# Patient Record
Sex: FEMALE | Race: OTHER | HISPANIC OR LATINO | Employment: FULL TIME | URBAN - METROPOLITAN AREA
[De-identification: names, ages, dates, MRNs, and addresses within clinical notes are randomized per-mention and may not be internally consistent; named-entity substitution may affect disease eponyms.]

---

## 2022-03-18 ENCOUNTER — APPOINTMENT (OUTPATIENT)
Dept: URGENT CARE | Facility: CLINIC | Age: 27
End: 2022-03-18

## 2022-03-18 ENCOUNTER — APPOINTMENT (OUTPATIENT)
Dept: LAB | Facility: CLINIC | Age: 27
End: 2022-03-18

## 2022-03-18 DIAGNOSIS — Z02.1 PRE-EMPLOYMENT HEALTH SCREENING EXAMINATION: ICD-10-CM

## 2022-03-18 DIAGNOSIS — Z02.1 PRE-EMPLOYMENT HEALTH SCREENING EXAMINATION: Primary | ICD-10-CM

## 2022-03-18 LAB — RUBV IGG SERPL IA-ACNC: >175 IU/ML

## 2022-03-18 PROCEDURE — 86762 RUBELLA ANTIBODY: CPT

## 2022-03-18 PROCEDURE — 86735 MUMPS ANTIBODY: CPT

## 2022-03-18 PROCEDURE — 86765 RUBEOLA ANTIBODY: CPT

## 2022-03-18 PROCEDURE — 86787 VARICELLA-ZOSTER ANTIBODY: CPT

## 2022-03-18 PROCEDURE — 36415 COLL VENOUS BLD VENIPUNCTURE: CPT

## 2022-03-18 PROCEDURE — 86480 TB TEST CELL IMMUN MEASURE: CPT

## 2022-03-21 LAB
GAMMA INTERFERON BACKGROUND BLD IA-ACNC: 0.04 IU/ML
M TB IFN-G BLD-IMP: NEGATIVE
M TB IFN-G CD4+ BCKGRND COR BLD-ACNC: 0.01 IU/ML
M TB IFN-G CD4+ BCKGRND COR BLD-ACNC: 0.03 IU/ML
MITOGEN IGNF BCKGRD COR BLD-ACNC: >10 IU/ML

## 2022-03-22 LAB
MEV IGG SER QL: NORMAL
MUV IGG SER QL: NORMAL
VZV IGG SER IA-ACNC: NORMAL

## 2022-04-28 ENCOUNTER — HOSPITAL ENCOUNTER (EMERGENCY)
Facility: HOSPITAL | Age: 27
Discharge: HOME | End: 2022-04-28
Attending: EMERGENCY MEDICINE
Payer: COMMERCIAL

## 2022-04-28 VITALS
HEART RATE: 80 BPM | TEMPERATURE: 97.9 F | OXYGEN SATURATION: 97 % | SYSTOLIC BLOOD PRESSURE: 128 MMHG | RESPIRATION RATE: 20 BRPM | WEIGHT: 159.61 LBS | DIASTOLIC BLOOD PRESSURE: 80 MMHG

## 2022-04-28 DIAGNOSIS — M79.604 RIGHT LEG PAIN: Primary | ICD-10-CM

## 2022-04-28 PROCEDURE — 96372 THER/PROPH/DIAG INJ SC/IM: CPT

## 2022-04-28 PROCEDURE — 99283 EMERGENCY DEPT VISIT LOW MDM: CPT

## 2022-04-28 PROCEDURE — 99284 EMERGENCY DEPT VISIT MOD MDM: CPT | Performed by: EMERGENCY MEDICINE

## 2022-04-28 RX ORDER — LIDOCAINE 40 MG/G
CREAM TOPICAL AS NEEDED
Qty: 30 G | Refills: 0 | Status: SHIPPED | OUTPATIENT
Start: 2022-04-28

## 2022-04-28 RX ORDER — NAPROXEN 250 MG/1
250 TABLET ORAL
Qty: 21 TABLET | Refills: 0 | Status: SHIPPED | OUTPATIENT
Start: 2022-04-28 | End: 2022-05-05

## 2022-04-28 RX ORDER — KETOROLAC TROMETHAMINE 30 MG/ML
15 INJECTION, SOLUTION INTRAMUSCULAR; INTRAVENOUS ONCE
Status: COMPLETED | OUTPATIENT
Start: 2022-04-28 | End: 2022-04-28

## 2022-04-28 RX ADMIN — KETOROLAC TROMETHAMINE 15 MG: 30 INJECTION, SOLUTION INTRAMUSCULAR at 10:38

## 2022-04-28 NOTE — Clinical Note
Tasia Crain was seen and treated in our emergency department on 4/28/2022  No restrictions            Diagnosis:     Kelsie Guerrero  may return to work on return date  She may return on this date: 04/30/2022         If you have any questions or concerns, please don't hesitate to call        Charlyne Ahumada, MD    ______________________________           _______________          _______________  Hospital Representative                              Date                                Time

## 2022-05-11 ENCOUNTER — HOSPITAL ENCOUNTER (EMERGENCY)
Facility: HOSPITAL | Age: 27
Discharge: HOME | End: 2022-05-11
Attending: EMERGENCY MEDICINE
Payer: COMMERCIAL

## 2022-05-11 VITALS
SYSTOLIC BLOOD PRESSURE: 126 MMHG | RESPIRATION RATE: 20 BRPM | HEART RATE: 90 BPM | OXYGEN SATURATION: 100 % | DIASTOLIC BLOOD PRESSURE: 78 MMHG | TEMPERATURE: 97.8 F

## 2022-05-11 DIAGNOSIS — R51.9 HEADACHE: Primary | ICD-10-CM

## 2022-05-11 DIAGNOSIS — R11.0 NAUSEA: ICD-10-CM

## 2022-05-11 PROCEDURE — 96361 HYDRATE IV INFUSION ADD-ON: CPT

## 2022-05-11 PROCEDURE — 96374 THER/PROPH/DIAG INJ IV PUSH: CPT

## 2022-05-11 PROCEDURE — 99284 EMERGENCY DEPT VISIT MOD MDM: CPT | Performed by: PHYSICIAN ASSISTANT

## 2022-05-11 PROCEDURE — 99283 EMERGENCY DEPT VISIT LOW MDM: CPT

## 2022-05-11 PROCEDURE — 96375 TX/PRO/DX INJ NEW DRUG ADDON: CPT

## 2022-05-11 RX ORDER — KETOROLAC TROMETHAMINE 30 MG/ML
30 INJECTION, SOLUTION INTRAMUSCULAR; INTRAVENOUS ONCE
Status: COMPLETED | OUTPATIENT
Start: 2022-05-11 | End: 2022-05-11

## 2022-05-11 RX ORDER — ONDANSETRON 4 MG/1
4 TABLET, ORALLY DISINTEGRATING ORAL EVERY 6 HOURS PRN
Qty: 20 TABLET | Refills: 0 | Status: SHIPPED | OUTPATIENT
Start: 2022-05-11

## 2022-05-11 RX ORDER — DIPHENHYDRAMINE HYDROCHLORIDE 50 MG/ML
25 INJECTION INTRAMUSCULAR; INTRAVENOUS ONCE
Status: COMPLETED | OUTPATIENT
Start: 2022-05-11 | End: 2022-05-11

## 2022-05-11 RX ORDER — METOCLOPRAMIDE HYDROCHLORIDE 5 MG/ML
10 INJECTION INTRAMUSCULAR; INTRAVENOUS ONCE
Status: COMPLETED | OUTPATIENT
Start: 2022-05-11 | End: 2022-05-11

## 2022-05-11 RX ADMIN — SODIUM CHLORIDE 1000 ML: 0.9 INJECTION, SOLUTION INTRAVENOUS at 05:06

## 2022-05-11 RX ADMIN — KETOROLAC TROMETHAMINE 30 MG: 30 INJECTION, SOLUTION INTRAMUSCULAR at 05:10

## 2022-05-11 RX ADMIN — METOCLOPRAMIDE 10 MG: 5 INJECTION, SOLUTION INTRAMUSCULAR; INTRAVENOUS at 05:08

## 2022-05-11 RX ADMIN — DIPHENHYDRAMINE HYDROCHLORIDE 25 MG: 50 INJECTION, SOLUTION INTRAMUSCULAR; INTRAVENOUS at 05:06

## 2022-05-11 NOTE — DISCHARGE INSTRUCTIONS
Tylenol and motrin for headache    Increase hydration  Use heating pad to the neck for improvement of headache  Apply ice to area of pain for improvement of headache  Zofran to the pharmacy for nausea  Follow up with family medicine

## 2022-05-11 NOTE — Clinical Note
Keyonna Lyles was seen and treated in our emergency department on 5/11/2022  No restrictions    ? ? Diagnosis: ?    Dean Morgan  is off the rest of the shift today  She may return on this date: ?    ? If you have any questions or concerns, please don't hesitate to call        James Cordoba PA-C    ______________________________           _______________          _______________  Hospital Representative                              Date                                Time

## 2022-05-11 NOTE — ED PROVIDER NOTES
History  Chief Complaint   Patient presents with    Migraine     Pt reports migraine on/off for 4 days  Hx of migraines  31 y/o F PMHx of migraines p/w intermittent headache for 4 days  Patient states headache tonight is frontal and pressure like, different than her migraines which are normally one sided and throbbing  Headache is associated with nausea which patient states is not typical for migraines  She has not taken anything prior to arrival   Patient is new to the area and has not been able to follow-up with family doctor for migraine medication  Patient does have follow-up appointment next week scheduled  She denies any visual disturbance, photophobia, phonophobia, focal deficit, facial asymmetry, slurring speech, numbness/tingling / weakness, gait imbalance  Patient also denies chest pain, shortness of breath, URI symptoms, abdominal pain, vomiting, diarrhea  History provided by:  Patient   used: No    Migraine  Location:   frontal  Quality:  Pressure  Severity:  Moderate  Onset quality:  Gradual  Timing:  Intermittent  Progression:  Waxing and waning  Chronicity:  New  Associated symptoms: headaches and nausea    Associated symptoms: no abdominal pain, no chest pain, no congestion, no cough, no diarrhea, no ear pain, no fatigue, no fever, no loss of consciousness, no myalgias, no rash, no rhinorrhea, no shortness of breath, no sore throat, no vomiting and no wheezing        Prior to Admission Medications   Prescriptions Last Dose Informant Patient Reported?  Taking?   lidocaine (LMX) 4 % cream   No No   Sig: Apply topically as needed for moderate pain   naproxen (NAPROSYN) 250 mg tablet   No No   Sig: Take 1 tablet (250 mg total) by mouth 3 (three) times a day with meals for 7 days      Facility-Administered Medications: None       Past Medical History:   Diagnosis Date    Fatty liver     Migraine        Past Surgical History:   Procedure Laterality Date     SECTION      LYMPHADENECTOMY         History reviewed  No pertinent family history  I have reviewed and agree with the history as documented  E-Cigarette/Vaping     E-Cigarette/Vaping Substances     Social History     Tobacco Use    Smoking status: Never Smoker    Smokeless tobacco: Never Used   Substance Use Topics    Alcohol use: Not Currently    Drug use: Never       Review of Systems   Constitutional: Negative for chills, fatigue and fever  HENT: Negative for congestion, ear pain, rhinorrhea, sinus pain, sore throat and trouble swallowing  Eyes: Negative for discharge, redness, itching and visual disturbance  Respiratory: Negative for cough, shortness of breath and wheezing  Cardiovascular: Negative for chest pain, palpitations and leg swelling  Gastrointestinal: Positive for nausea  Negative for abdominal pain, constipation, diarrhea and vomiting  Genitourinary: Negative for dysuria, frequency, hematuria, urgency, vaginal bleeding and vaginal discharge  Musculoskeletal: Negative for back pain, myalgias, neck pain and neck stiffness  Skin: Negative for color change, pallor, rash and wound  Neurological: Positive for headaches  Negative for dizziness, loss of consciousness, syncope, weakness, light-headedness and numbness  All other systems reviewed and are negative  Physical Exam  Physical Exam  Vitals reviewed  Constitutional:       General: She is not in acute distress  Appearance: Normal appearance  She is well-developed and well-groomed  She is not ill-appearing, toxic-appearing or diaphoretic  HENT:      Head: Normocephalic and atraumatic  Right Ear: External ear normal       Left Ear: External ear normal       Nose: Nose normal  No congestion or rhinorrhea  Mouth/Throat:      Mouth: Mucous membranes are moist       Pharynx: Oropharynx is clear  No oropharyngeal exudate or posterior oropharyngeal erythema     Eyes:      General: Lids are normal  No scleral icterus  Right eye: No discharge  Left eye: No discharge  Extraocular Movements: Extraocular movements intact  Right eye: Normal extraocular motion and no nystagmus  Left eye: Normal extraocular motion and no nystagmus  Conjunctiva/sclera: Conjunctivae normal       Pupils: Pupils are equal, round, and reactive to light  Cardiovascular:      Rate and Rhythm: Normal rate and regular rhythm  Pulses: Normal pulses  Heart sounds: No murmur heard  No friction rub  No gallop  Pulmonary:      Effort: Pulmonary effort is normal  No respiratory distress  Breath sounds: Normal breath sounds  No wheezing, rhonchi or rales  Abdominal:      General: Abdomen is flat  There is no distension  Palpations: Abdomen is soft  Tenderness: There is no abdominal tenderness  There is no right CVA tenderness, left CVA tenderness, guarding or rebound  Musculoskeletal:         General: No deformity  Normal range of motion  Cervical back: Normal range of motion and neck supple  Skin:     General: Skin is warm and dry  Coloration: Skin is not jaundiced or pale  Findings: No rash  Neurological:      General: No focal deficit present  Mental Status: She is alert and oriented to person, place, and time  GCS: GCS eye subscore is 4  GCS verbal subscore is 5  GCS motor subscore is 6  Cranial Nerves: Cranial nerves are intact  No dysarthria or facial asymmetry  Sensory: Sensation is intact  No sensory deficit  Motor: Motor function is intact  No weakness or abnormal muscle tone  Coordination: Coordination is intact  Romberg sign negative  Coordination normal  Finger-Nose-Finger Test and Heel to [facility] [[email or web address]] Test normal       Gait: Gait is intact  Gait normal    Psychiatric:         Mood and Affect: Mood normal          Behavior: Behavior normal  Behavior is cooperative           Vital Signs  ED Triage Vitals   Temperature Pulse Respirations Blood Pressure SpO2   05/11/22 0403 05/11/22 0403 05/11/22 0403 05/11/22 0403 05/11/22 0403   97 8 °F (36 6 °C) 102 20 129/73 100 %      Temp Source Heart Rate Source Patient Position - Orthostatic VS BP Location FiO2 (%)   05/11/22 0403 05/11/22 0403 05/11/22 0603 05/11/22 0403 --   Oral Monitor Lying Right arm       Pain Score       05/11/22 0510       6           Vitals:    05/11/22 0403 05/11/22 0603   BP: 129/73 126/78   Pulse: 102 90   Patient Position - Orthostatic VS:  Lying         Visual Acuity      ED Medications  Medications   ketorolac (TORADOL) injection 30 mg (30 mg Intravenous Given 5/11/22 0510)   metoclopramide (REGLAN) injection 10 mg (10 mg Intravenous Given 5/11/22 0508)   diphenhydrAMINE (BENADRYL) injection 25 mg (25 mg Intravenous Given 5/11/22 0506)   sodium chloride 0 9 % bolus 1,000 mL (0 mL Intravenous Stopped 5/11/22 0602)       Diagnostic Studies  Results Reviewed     None                 No orders to display              Procedures  Procedures         ED Course  ED Course as of 05/11/22 0755   Wed May 11, 2022   0500 Reglan, Benadryl, Toradol and IV fluids given at this time  Will re-evaluate patient  4853 Patient feels that headache and nausea has improved at this time  Will send Zofran to the pharmacy for nausea  Patient comfortable with discharge and plan of care at this time             MDM  Number of Diagnoses or Management Options  Headache: established and worsening  Nausea: established and worsening  Diagnosis management comments:     68-year-old female presenting with headache that is pressure-like and located in the frontal aspect of the head, is associated with nausea  Patient denies any focal weakness, slurring speech, facial asymmetry, numbness/ tingling /weakness, gait imbalance, visual disturbance    She has history of migraines states this is different, has follow-up with Family Medicine in a week, has not been on medications for her migraines for some time    Patient is neurologically intact on exam   Will treat with IVF, Toradol, Reglan, Benadryl at this time and re-evaluate for improvement of symptoms  Patient feeling improved at this time, patient is comfortable with discharge  Both patient headache and nausea are improving  Discharge instructions discussed with patient bedside, patient agreeable to plan of care  Strict return precautions discussed with patient bedside, patient verbalized understanding of signs and symptoms that warrant immediate return to the emergency department  Dispo:   Discharge home with follow-up to [facility]  Zofran to the pharmacy for nausea  Treat headache with Tylenol and Motrin at home, can trial heating pad to the neck or ice to the head for improvement symptoms  Encourage rest in a dark room for improvement of headache  Patient appears well, is nontoxic and in no acute distress at time of discharge  Prior to discharge, plan of care was discussed in detail with the patient at bedside  Patient was provided both verbal and written instructions  The patient verbalized understanding of the discharge instructions and warnings that would necessitate return to the ED  All questions were answered  Patient was comfortable with the plan of care and discharged to home  Patient stable at discharge         Amount and/or Complexity of Data Reviewed  Tests in the medicine section of CPT®: ordered and reviewed  Review and summarize past medical records: yes  Independent visualization of images, tracings, or specimens: yes    Risk of Complications, Morbidity, and/or Mortality  Presenting problems: moderate  Diagnostic procedures: low  Management options: low    Patient Progress  Patient progress: improved      Disposition  Final diagnoses:   Headache   Nausea     Time reflects when diagnosis was documented in both MDM as applicable and the Disposition within this note     Time User Action Codes Description Comment    5/11/2022 6:03 AM Evaline Orn Add [R51 9] Headache     5/11/2022  6:03 AM Evaline Orn Add [R11 0] Nausea       ED Disposition     ED Disposition   Discharge    Condition   Stable    Date/Time   Wed May 11, 2022  6:03 AM    Comment   Hernán Galloway discharge to home/self care  Follow-up Information     Follow up With Specialties Details Why Contact Info    BING Calderon Nurse Practitioner On 5/19/2022  [facility]  [address] [place] [id number] [phone]            Discharge Medication List as of 5/11/2022  6:07 AM      START taking these medications    Details   ondansetron (Zofran ODT) 4 mg disintegrating tablet Take 1 tablet (4 mg total) by mouth every 6 (six) hours as needed for nausea or vomiting, Starting Wed 5/11/2022, Normal         CONTINUE these medications which have NOT CHANGED    Details   lidocaine (LMX) 4 % cream Apply topically as needed for moderate pain, Starting Thu 4/28/2022, Print      naproxen (NAPROSYN) 250 mg tablet Take 1 tablet (250 mg total) by mouth 3 (three) times a day with meals for 7 days, Starting Thu 4/28/2022, Until Thu 5/5/2022, Print             No discharge procedures on file      PDMP Review     None          ED Provider  Electronically Signed by AMRITA Coker PA-C  05/11/22 0728

## 2023-02-08 ENCOUNTER — APPOINTMENT (EMERGENCY)
Dept: CT IMAGING | Facility: HOSPITAL | Age: 28
End: 2023-02-08

## 2023-02-08 ENCOUNTER — HOSPITAL ENCOUNTER (EMERGENCY)
Facility: HOSPITAL | Age: 28
Discharge: HOME | End: 2023-02-08
Attending: EMERGENCY MEDICINE

## 2023-02-08 VITALS
SYSTOLIC BLOOD PRESSURE: 135 MMHG | RESPIRATION RATE: 18 BRPM | TEMPERATURE: 98.3 F | DIASTOLIC BLOOD PRESSURE: 93 MMHG | WEIGHT: 166.01 LBS | OXYGEN SATURATION: 99 % | HEART RATE: 91 BPM

## 2023-02-08 DIAGNOSIS — R16.0 HEPATOMEGALY: ICD-10-CM

## 2023-02-08 DIAGNOSIS — R10.9 RIGHT FLANK PAIN: Primary | ICD-10-CM

## 2023-02-08 DIAGNOSIS — K76.0 HEPATIC STEATOSIS: ICD-10-CM

## 2023-02-08 LAB
ALBUMIN SERPL BCP-MCNC: 4.1 G/DL (ref 3.5–5)
ALP SERPL-CCNC: 85 U/L (ref 34–104)
ALT SERPL W P-5'-P-CCNC: 47 U/L (ref 7–52)
ANION GAP SERPL CALCULATED.3IONS-SCNC: 7 MMOL/L (ref 4–13)
AST SERPL W P-5'-P-CCNC: 25 U/L (ref 13–39)
BASOPHILS # BLD AUTO: 0.05 THOUSANDS/ÂΜL (ref 0–0.1)
BASOPHILS NFR BLD AUTO: 1 % (ref 0–1)
BILIRUB SERPL-MCNC: 0.59 MG/DL (ref 0.2–1)
BILIRUB UR QL STRIP: NEGATIVE
BUN SERPL-MCNC: 9 MG/DL (ref 5–25)
CALCIUM SERPL-MCNC: 8.8 MG/DL (ref 8.4–10.2)
CHLORIDE SERPL-SCNC: 107 MMOL/L (ref 96–108)
CLARITY UR: CLEAR
CO2 SERPL-SCNC: 23 MMOL/L (ref 21–32)
COLOR UR: YELLOW
CREAT SERPL-MCNC: 0.59 MG/DL (ref 0.6–1.3)
EOSINOPHIL # BLD AUTO: 0.18 THOUSAND/ÂΜL (ref 0–0.61)
EOSINOPHIL NFR BLD AUTO: 2 % (ref 0–6)
ERYTHROCYTE [DISTWIDTH] IN BLOOD BY AUTOMATED COUNT: 12.8 % (ref 11.6–15.1)
EXT PREGNANCY TEST URINE: NEGATIVE
EXT. CONTROL: NORMAL
GFR SERPL CREATININE-BSD FRML MDRD: 124 ML/MIN/1.73SQ M
GLUCOSE SERPL-MCNC: 125 MG/DL (ref 65–140)
GLUCOSE UR STRIP-MCNC: NEGATIVE MG/DL
HCT VFR BLD AUTO: 41 % (ref 34.8–46.1)
HGB BLD-MCNC: 13.6 G/DL (ref 11.5–15.4)
HGB UR QL STRIP.AUTO: NEGATIVE
IMM GRANULOCYTES # BLD AUTO: 0.09 THOUSAND/UL (ref 0–0.2)
IMM GRANULOCYTES NFR BLD AUTO: 1 % (ref 0–2)
KETONES UR STRIP-MCNC: NEGATIVE MG/DL
LEUKOCYTE ESTERASE UR QL STRIP: NEGATIVE
LIPASE SERPL-CCNC: 20 U/L (ref 11–82)
LYMPHOCYTES # BLD AUTO: 3 THOUSANDS/ÂΜL (ref 0.6–4.47)
LYMPHOCYTES NFR BLD AUTO: 30 % (ref 14–44)
MCH RBC QN AUTO: 27.9 PG (ref 26.8–34.3)
MCHC RBC AUTO-ENTMCNC: 33.2 G/DL (ref 31.4–37.4)
MCV RBC AUTO: 84 FL (ref 82–98)
MONOCYTES # BLD AUTO: 0.65 THOUSAND/ÂΜL (ref 0.17–1.22)
MONOCYTES NFR BLD AUTO: 7 % (ref 4–12)
NEUTROPHILS # BLD AUTO: 6.1 THOUSANDS/ÂΜL (ref 1.85–7.62)
NEUTS SEG NFR BLD AUTO: 59 % (ref 43–75)
NITRITE UR QL STRIP: NEGATIVE
NRBC BLD AUTO-RTO: 0 /100 WBCS
PH UR STRIP.AUTO: 5.5 [PH] (ref 4.5–8)
PLATELET # BLD AUTO: 300 THOUSANDS/UL (ref 149–390)
PMV BLD AUTO: 10.7 FL (ref 8.9–12.7)
POTASSIUM SERPL-SCNC: 3.9 MMOL/L (ref 3.5–5.3)
PROT SERPL-MCNC: 7.6 G/DL (ref 6.4–8.4)
PROT UR STRIP-MCNC: NEGATIVE MG/DL
RBC # BLD AUTO: 4.87 MILLION/UL (ref 3.81–5.12)
SODIUM SERPL-SCNC: 137 MMOL/L (ref 135–147)
SP GR UR STRIP.AUTO: >=1.03 (ref 1–1.03)
UROBILINOGEN UR QL STRIP.AUTO: 0.2 E.U./DL
WBC # BLD AUTO: 10.07 THOUSAND/UL (ref 4.31–10.16)

## 2023-02-08 RX ORDER — LORAZEPAM 2 MG/ML
1 INJECTION INTRAMUSCULAR ONCE
Status: COMPLETED | OUTPATIENT
Start: 2023-02-08 | End: 2023-02-08

## 2023-02-08 RX ORDER — NAPROXEN 500 MG/1
500 TABLET ORAL 2 TIMES DAILY WITH MEALS
Qty: 30 TABLET | Refills: 0 | Status: SHIPPED | OUTPATIENT
Start: 2023-02-08

## 2023-02-08 RX ORDER — KETOROLAC TROMETHAMINE 30 MG/ML
15 INJECTION, SOLUTION INTRAMUSCULAR; INTRAVENOUS ONCE
Status: COMPLETED | OUTPATIENT
Start: 2023-02-08 | End: 2023-02-08

## 2023-02-08 RX ORDER — ONDANSETRON 2 MG/ML
4 INJECTION INTRAMUSCULAR; INTRAVENOUS ONCE
Status: COMPLETED | OUTPATIENT
Start: 2023-02-08 | End: 2023-02-08

## 2023-02-08 RX ADMIN — ONDANSETRON HYDROCHLORIDE 4 MG: 2 SOLUTION INTRAMUSCULAR; INTRAVENOUS at 12:00

## 2023-02-08 RX ADMIN — IOHEXOL 100 ML: 350 INJECTION, SOLUTION INTRAVENOUS at 12:04

## 2023-02-08 RX ADMIN — LORAZEPAM 1 MG: 2 INJECTION INTRAMUSCULAR; INTRAVENOUS at 12:00

## 2023-02-08 RX ADMIN — KETOROLAC TROMETHAMINE 15 MG: 30 INJECTION, SOLUTION INTRAMUSCULAR; INTRAVENOUS at 11:37

## 2023-02-08 NOTE — ED PROVIDER NOTES
History  Chief Complaint   Patient presents with   • Abdominal Pain     Patient reports right side pain as well pelvic pressure when she urinates and intermittently  Denies blood in urine or increase in urinary frequency  27y  o female with PMH of fatty liver and migraine presents to the ER for right flank pain, nausea and pressure when urinating for a few days  She denies taking any medication for symptoms  She describes her pain as sharp and radiating between her abdomen and back  Pain comes and goes  She denies fever, chills, URI symptoms, chest pain, dyspnea, vomiting, diarrhea, hematuria, dysuria, urgency, frequency, weakness or paresthesias  History provided by:  Patient   used: No        Prior to Admission Medications   Prescriptions Last Dose Informant Patient Reported? Taking?   lidocaine (LMX) 4 % cream   No No   Sig: Apply topically as needed for moderate pain   naproxen (NAPROSYN) 250 mg tablet   No No   Sig: Take 1 tablet (250 mg total) by mouth 3 (three) times a day with meals for 7 days   ondansetron (Zofran ODT) 4 mg disintegrating tablet   No No   Sig: Take 1 tablet (4 mg total) by mouth every 6 (six) hours as needed for nausea or vomiting      Facility-Administered Medications: None       Past Medical History:   Diagnosis Date   • Fatty liver    • Migraine        Past Surgical History:   Procedure Laterality Date   •  SECTION     • LYMPHADENECTOMY         History reviewed  No pertinent family history  I have reviewed and agree with the history as documented  E-Cigarette/Vaping     E-Cigarette/Vaping Substances     Social History     Tobacco Use   • Smoking status: Never   • Smokeless tobacco: Never   Substance Use Topics   • Alcohol use: Not Currently   • Drug use: Never       Review of Systems   Constitutional: Negative for activity change, appetite change, chills and fever     HENT: Negative for congestion, drooling, ear discharge, ear pain, facial swelling, rhinorrhea and sore throat  Eyes: Negative for redness  Respiratory: Negative for cough and shortness of breath  Cardiovascular: Negative for chest pain  Gastrointestinal: Positive for nausea  Negative for abdominal pain, diarrhea and vomiting  Genitourinary: Positive for flank pain  Negative for dysuria, frequency, hematuria and urgency  Musculoskeletal: Negative for neck stiffness  Skin: Negative for rash  Allergic/Immunologic: Negative for food allergies  Neurological: Negative for weakness and numbness  Physical Exam  Physical Exam  Vitals and nursing note reviewed  Constitutional:       General: She is not in acute distress  Appearance: She is not toxic-appearing  HENT:      Head: Normocephalic and atraumatic  Eyes:      Conjunctiva/sclera: Conjunctivae normal    Neck:      Trachea: No tracheal deviation  Cardiovascular:      Rate and Rhythm: Normal rate and regular rhythm  Heart sounds: Normal heart sounds, S1 normal and S2 normal  No murmur heard  No friction rub  No gallop  Pulmonary:      Effort: Pulmonary effort is normal  No respiratory distress  Breath sounds: Normal breath sounds  No decreased breath sounds, wheezing, rhonchi or rales  Chest:      Chest wall: No tenderness  Abdominal:      General: Bowel sounds are normal  There is no distension  Palpations: Abdomen is soft  Tenderness: There is abdominal tenderness in the suprapubic area  There is right CVA tenderness  There is no left CVA tenderness, guarding or rebound  Musculoskeletal:      Cervical back: Normal range of motion and neck supple  Skin:     General: Skin is warm and dry  Findings: No rash  Neurological:      Mental Status: She is alert  GCS: GCS eye subscore is 4  GCS verbal subscore is 5  GCS motor subscore is 6     Psychiatric:         Mood and Affect: Mood normal          Vital Signs  ED Triage Vitals [02/08/23 0908]   Temperature Pulse Respirations Blood Pressure SpO2   98 3 °F (36 8 °C) (!) 108 20 142/78 99 %      Temp Source Heart Rate Source Patient Position - Orthostatic VS BP Location FiO2 (%)   Oral Monitor Sitting Right arm --      Pain Score       5           Vitals:    02/08/23 0908 02/08/23 1148   BP: 142/78 135/93   Pulse: (!) 108 91   Patient Position - Orthostatic VS: Sitting Sitting         Visual Acuity      ED Medications  Medications   ketorolac (TORADOL) injection 15 mg (15 mg Intravenous Given 2/8/23 1137)   LORazepam (ATIVAN) injection 1 mg (1 mg Intravenous Given 2/8/23 1200)   ondansetron (ZOFRAN) injection 4 mg (4 mg Intravenous Given 2/8/23 1200)   iohexol (OMNIPAQUE) 350 MG/ML injection (SINGLE-DOSE) 100 mL (100 mL Intravenous Given 2/8/23 1204)       Diagnostic Studies  Results Reviewed     Procedure Component Value Units Date/Time    Comprehensive metabolic panel [[id number]]  (Abnormal) Collected: 02/08/23 0957    Lab Status: Final result Specimen: Blood from Arm, Left Updated: 02/08/23 1125     Sodium 137 mmol/L      Potassium 3 9 mmol/L      Chloride 107 mmol/L      CO2 23 mmol/L      ANION GAP 7 mmol/L      BUN 9 mg/dL      Creatinine 0 59 mg/dL      Glucose 125 mg/dL      Calcium 8 8 mg/dL      AST 25 U/L      ALT 47 U/L      Alkaline Phosphatase 85 U/L      Total Protein 7 6 g/dL      Albumin 4 1 g/dL      Total Bilirubin 0 59 mg/dL      eGFR 124 ml/min/1 73sq m     Narrative:      Meet guidelines for Chronic Kidney Disease (CKD):   •  Stage 1 with normal or high GFR (GFR > 90 mL/min/1 73 square meters)  •  Stage 2 Mild CKD (GFR = 60-89 mL/min/1 73 square meters)  •  Stage 3A Moderate CKD (GFR = 45-59 mL/min/1 73 square meters)  •  Stage 3B Moderate CKD (GFR = 30-44 mL/min/1 73 square meters)  •  Stage 4 Severe CKD (GFR = 15-29 mL/min/1 73 square meters)  •  Stage 5 End Stage CKD (GFR <15 mL/min/1 73 square meters)  Note: GFR calculation is accurate only with a steady state creatinine    Lipase [[id number]]  (Normal) Collected: 02/08/23 0957    Lab Status: Final result Specimen: Blood from Arm, Left Updated: 02/08/23 1125     Lipase 20 u/L     POCT pregnancy, urine [[id number]]  (Normal) Resulted: 02/08/23 1125    Lab Status: Final result Specimen: Urine Updated: 02/08/23 1125     EXT Preg Test, Ur Negative     Control Valid    Urine Macroscopic, POC [[id number]] Collected: 02/08/23 1123    Lab Status: Final result Specimen: Urine Updated: 02/08/23 1125     Color, UA Yellow     Clarity, UA Clear     pH, UA 5 5     Leukocytes, UA Negative     Nitrite, UA Negative     Protein, UA Negative mg/dl      Glucose, UA Negative mg/dl      Ketones, UA Negative mg/dl      Urobilinogen, UA 0 2 E U /dl      Bilirubin, UA Negative     Occult Blood, UA Negative     Specific Gravity, UA >=1 030    Narrative:      CLINITEK RESULT    CBC and differential [[id number]] Collected: 02/08/23 0957    Lab Status: Final result Specimen: Blood from Arm, Left Updated: 02/08/23 1014     WBC 10 07 Thousand/uL      RBC 4 87 Million/uL      Hemoglobin 13 6 g/dL      Hematocrit 41 0 %      MCV 84 fL      MCH 27 9 pg      MCHC 33 2 g/dL      RDW 12 8 %      MPV 10 7 fL      Platelets 315 Thousands/uL      nRBC 0 /100 WBCs      Neutrophils Relative 59 %      Immat GRANS % 1 %      Lymphocytes Relative 30 %      Monocytes Relative 7 %      Eosinophils Relative 2 %      Basophils Relative 1 %      Neutrophils Absolute 6 10 Thousands/µL      Immature Grans Absolute 0 09 Thousand/uL      Lymphocytes Absolute 3 00 Thousands/µL      Monocytes Absolute 0 65 Thousand/µL      Eosinophils Absolute 0 18 Thousand/µL      Basophils Absolute 0 05 Thousands/µL                  CT abdomen pelvis with contrast   Final Result by Honey Shane MD (02/08 1315)      No acute inflammatory changes in the abdomen or pelvis  Hepatomegaly and hepatic steatosis              Workstation performed: [id number]                    Procedures  Procedures ED Course  ED Course as of 02/08/23 1334   Wed Feb 08, 2023   1110 WBC: 10 07   1110 Hemoglobin: 13 6   1111 Platelet Count: 129   1139 PREGNANCY TEST URINE: Negative   1139 Lipase: 20   1139 Urine Macroscopic, POC  Normal    1139 Comprehensive metabolic panel(!)  Normal    1204 Called to CT by CT technician  Patient reports experiencing anxiety when getting a CT and also gets nauseous  Will medicate with Ativan and Zofran  1317 Informed patient of lab and imaging findings  Patient reports improvement in symptoms  Will discharge  Medical Decision Making  14Z  o female presents to the ER for right flank pain, nausea and pressure when urinating for a few days  Patient mildly tachycardic when vitals were taken  Will monitor  Otherwise vitals are stable  Patient in no acute distress  On exam, lungs are clear  Heart is regular rate and rhythm  Abdomen is soft and tender in the suprapubic area  No distention, guarding or rigidity  No pulsatile masses palpated  Right CVAT  Will check labs and imaging  1110 WBC: 10 07    1110 Hemoglobin: 13 6    1111 Platelet Count: 781    1139 PREGNANCY TEST URINE: Negative    1139 Lipase: 20    1139 Urine Macroscopic, POC - Normal     1139 Comprehensive metabolic panel(!) - Normal     1204 Called to CT by CT technician  Patient reports experiencing anxiety when getting a CT and also gets nauseous  Will medicate with Ativan and Zofran  1317 Informed patient of lab and imaging findings  Patient reports improvement in symptoms  Will discharge  Patient agreeable  The management plan was discussed in detail with the patient at bedside and all questions were answered  Prior to discharge, we provided both verbal and written instructions  We discussed with the patient the signs and symptoms for which to return to the emergency department    All questions were answered and patient was comfortable with the plan of care and discharged to home   Instructed the patient to follow up with the primary care provider and/or specialist provided and their written instructions  The patient verbalized understanding of our discussion and plan of care, and agrees to return to the Emergency Department for concerns and progression of illness  At discharge, I instructed the patient to:  -follow up with pcp  -take Naproxen as prescribed  -rest and drink plenty of fluids  -follow up with GI for CT findings  -return to the ER if symptoms worsened or new symptoms arose  Patient agreed to this plan and was stable at time of discharge  Hepatic steatosis: acute illness or injury  Hepatomegaly: acute illness or injury  Right flank pain: acute illness or injury  Amount and/or Complexity of Data Reviewed  Independent Historian:      Details: Patient is historian  Labs: ordered  Decision-making details documented in ED Course  Radiology: ordered  Risk  Prescription drug management  Disposition  Final diagnoses:   Right flank pain   Hepatomegaly   Hepatic steatosis     Time reflects when diagnosis was documented in both MDM as applicable and the Disposition within this note     Time User Action Codes Description Comment    2/8/2023  1:21 PM Rosalee Galeano A Add [R10 9] Right flank pain     2/8/2023  1:22 PM Rosalee Galeano A Add [R16 0] Hepatomegaly     2/8/2023  1:22 PM Rosalee Galeano A Add [K76 0] Hepatic steatosis       ED Disposition     ED Disposition   Discharge    Condition   Stable    Date/Time   Wed Feb 8, 2023  1:21 PM    Comment   Alice Harp discharge to home/self care                 Follow-up Information     Follow up With Specialties Details Why Contact Info Additional Information    Maris Espinal MD Family Medicine Schedule an appointment as soon as possible for a visit  As needed [address] [id number]  [address] [facility] Schedule an appointment as soon as possible for a visit  for CT findings [address]  [address] [facility] [address]  Anika Medina [address] [facility], [address], [address], [facility], [place], [address] [phone]          Patient's Medications   Discharge Prescriptions    NAPROXEN (NAPROSYN) 500 MG TABLET    Take 1 tablet (500 mg total) by mouth 2 (two) times a day with meals       Start Date: 2/8/2023  End Date: --       Order Dose: 500 mg       Quantity: 30 tablet    Refills: 0       No discharge procedures on file      PDMP Review     None          ED Provider  Electronically Signed by           Veronica Pena PA-C  02/08/23 1807

## 2023-02-08 NOTE — DISCHARGE INSTRUCTIONS
DISCHARGE INSTRUCTIONS:    FOLLOW UP WITH YOUR PRIMARY CARE PROVIDER OR THE [address] [place] [address]  MAKE AN APPOINTMENT TO BE SEEN  TAKE MEDICATION AS PRESCRIBED  IF RASH, SHORTNESS OF BREATH OR TROUBLE SWALLOWING, STOP TAKING THE MEDICATION AND BE SEEN  REST AND DRINK PLENTY OF FLUIDS  FOLLOW UP WITH THE RECOMMENDED GASTROENTEROLOGIST FOR CT FINDINGS  IF SYMPTOMS WORSEN OR NEW SYMPTOMS ARISE, RETURN TO THE ER TO BE SEEN

## 2023-02-24 ENCOUNTER — OFFICE VISIT (OUTPATIENT)
Dept: GASTROENTEROLOGY | Facility: CLINIC | Age: 28
End: 2023-02-24

## 2023-02-24 VITALS
TEMPERATURE: 98.4 F | DIASTOLIC BLOOD PRESSURE: 60 MMHG | WEIGHT: 160 LBS | SYSTOLIC BLOOD PRESSURE: 102 MMHG | BODY MASS INDEX: 32.25 KG/M2 | HEIGHT: 59 IN

## 2023-02-24 DIAGNOSIS — K76.0 FATTY LIVER: ICD-10-CM

## 2023-02-24 DIAGNOSIS — R16.0 ENLARGED LIVER: Primary | ICD-10-CM

## 2023-02-25 NOTE — PROGRESS NOTES
[facility] - Outpatient Consultation  Godfrey Enrique 32 y o  female MRN: [id number]  Encounter: [id number]          ASSESSMENT AND PLAN:      1  Enlarged liver  - Hepatitis C antibody; Future  - US elastography; Future    2  BMI 32 0-32 9,adult  3  Fatty liver disease   4  Discussion regarding management  - Ambulatory Referral to Weight Management; Future  -Discussed low-carb diet  -Discussed weight management options with bariatrics and considering nutrition evaluation  -Recommend weight loss on a yearly basis of at least 8-10%  -Avoidance of fatty foods and adopting healthy lifestyle  -Recent LFTs within normal limits      ______________________________________________________________________    HPI:     Patient presents for evaluation for fatty liver disease in setting of BMI of 32  No history of liver disease  Family history (father) of cirrhosis in setting of alcohol use  She denies active alcohol use  She is trying to lose weight but having difficulty  She has a hard time being complaint with diet due to loss of control  Denies family history of colon cancer  No prior endoscopic evaluation  LFTs are normal      REVIEW OF SYSTEMS:    CONSTITUTIONAL: Denies any fever, chills, rigors, and weight loss  HEENT: No earache or tinnitus  Denies hearing loss or visual disturbances  CARDIOVASCULAR: No chest pain or palpitations  RESPIRATORY: Denies any cough, hemoptysis, shortness of breath or dyspnea on exertion  GASTROINTESTINAL: As noted in the History of Present Illness  GENITOURINARY: No problems with urination  Denies any hematuria or dysuria  NEUROLOGIC: No dizziness or vertigo, denies headaches  MUSCULOSKELETAL: Denies any muscle or joint pain  SKIN: Denies skin rashes or itching  ENDOCRINE: Denies excessive thirst  Denies intolerance to heat or cold  PSYCHOSOCIAL: Denies depression or anxiety  Denies any recent memory loss         Historical Information Past Medical History:   Diagnosis Date   • Fatty liver    • Migraine      Past Surgical History:   Procedure Laterality Date   •  SECTION     • LYMPHADENECTOMY       Social History   Social History     Substance and Sexual Activity   Alcohol Use Not Currently     Social History     Substance and Sexual Activity   Drug Use Never     Social History     Tobacco Use   Smoking Status Never   Smokeless Tobacco Never     History reviewed  No pertinent family history  Meds/Allergies       Current Outpatient Medications:   •  lidocaine (LMX) 4 % cream  •  naproxen (NAPROSYN) 250 mg tablet  •  naproxen (NAPROSYN) 500 mg tablet  •  ondansetron (Zofran ODT) 4 mg disintegrating tablet    No Known Allergies        Objective     Blood pressure 102/60, temperature 98 4 °F (36 9 °C), temperature source Tympanic, height 4' 11" (1 499 m), weight 72 6 kg (160 lb)  Body mass index is 32 32 kg/m²  PHYSICAL EXAM:      General Appearance:   Alert, cooperative, no distress   HEENT:   Normocephalic, atraumatic, anicteric      Neck:  Supple, symmetrical, trachea midline   Lungs:   Clear to auscultation bilaterally; no rales, rhonchi or wheezing; respirations unlabored    Heart[de-identified]   Regular rate and rhythm; no murmur, rub, or gallop  Abdomen:   Soft, non-tender, non-distended; normal bowel sounds; no masses, no organomegaly    Genitalia:   Deferred    Rectal:   Deferred    Extremities:  No cyanosis, clubbing or edema    Pulses:  2+ and symmetric    Skin:  No jaundice, rashes, or lesions    Lymph nodes:  No palpable cervical lymphadenopathy        Lab Results:   No visits with results within 1 Day(s) from this visit     Latest known visit with results is:   Admission on 2023, Discharged on 2023   Component Date Value   • WBC 2023 10 07    • RBC 2023 4 87    • Hemoglobin 2023 13 6    • Hematocrit 2023 41 0    • MCV 2023 84    • MCH 2023 27 9    • MCHC 2023 33 2    • RDW 02/08/2023 12 8    • MPV 02/08/2023 10 7    • Platelets 65/86/1571 300    • nRBC 02/08/2023 0    • Neutrophils Relative 02/08/2023 59    • Immat GRANS % 02/08/2023 1    • Lymphocytes Relative 02/08/2023 30    • Monocytes Relative 02/08/2023 7    • Eosinophils Relative 02/08/2023 2    • Basophils Relative 02/08/2023 1    • Neutrophils Absolute 02/08/2023 6 10    • Immature Grans Absolute 02/08/2023 0 09    • Lymphocytes Absolute 02/08/2023 3 00    • Monocytes Absolute 02/08/2023 0 65    • Eosinophils Absolute 02/08/2023 0 18    • Basophils Absolute 02/08/2023 0 05    • Sodium 02/08/2023 137    • Potassium 02/08/2023 3 9    • Chloride 02/08/2023 107    • CO2 02/08/2023 23    • ANION GAP 02/08/2023 7    • BUN 02/08/2023 9    • Creatinine 02/08/2023 0 59 (L)    • Glucose 02/08/2023 125    • Calcium 02/08/2023 8 8    • AST 02/08/2023 25    • ALT 02/08/2023 47    • Alkaline Phosphatase 02/08/2023 85    • Total Protein 02/08/2023 7 6    • Albumin 02/08/2023 4 1    • Total Bilirubin 02/08/2023 0 59    • eGFR 02/08/2023 124    • Lipase 02/08/2023 20    • EXT Preg Test, Ur 02/08/2023 Negative    • Control 02/08/2023 Valid    • Color, UA 02/08/2023 Yellow    • Clarity, UA 02/08/2023 Clear    • pH, UA 02/08/2023 5 5    • Leukocytes, UA 02/08/2023 Negative    • Nitrite, UA 02/08/2023 Negative    • Protein, UA 02/08/2023 Negative    • Glucose, UA 02/08/2023 Negative    • Ketones, UA 02/08/2023 Negative    • Urobilinogen, UA 02/08/2023 0 2    • Bilirubin, UA 02/08/2023 Negative    • Occult Blood, UA 02/08/2023 Negative    • Specific Gravity, UA 02/08/2023 >=1 030          Radiology Results:   CT abdomen pelvis with contrast    Result Date: 2/8/2023  Narrative: CT ABDOMEN AND PELVIS WITH IV CONTRAST INDICATION:   Abdominal pain, acute, nonlocalized right sided pain    "27y  o female with PMH of fatty liver and migraine presents to the ER for right flank pain, nausea and pressure when urinating for a few days   She denies taking any medication for symptoms  She describes her pain as sharp and radiating between her abdomen and back  Pain comes and goes  She denies fever, chills, URI symptoms, chest pain, dyspnea, vomiting, diarrhea, hematuria, dysuria, urgency, frequency, weakness or paresthesias " COMPARISON:  Outside CT Abdomen Pelvis Report Available in [facility] Dated 11/17/2022  TECHNIQUE:  CT examination of the abdomen and pelvis was performed  Axial, sagittal, and coronal 2D reformatted images were created from the source data and submitted for interpretation  Radiation dose length product (DLP) for this visit:  410 mGy-cm   This examination, like all CT scans performed in the [facility], was performed utilizing techniques to minimize radiation dose exposure, including the use of iterative reconstruction and automated exposure control  IV Contrast:  100 mL of iohexol (OMNIPAQUE) Enteric Contrast:  Enteric contrast was not administered  FINDINGS: ABDOMEN LOWER CHEST:  No clinically significant abnormality identified in the visualized lower chest  LIVER/BILIARY TREE:  Hepatomegaly and hepatic steatosis GALLBLADDER:  No calcified gallstones  No pericholecystic inflammatory change  SPLEEN:  Unremarkable  PANCREAS:  Unremarkable  ADRENAL GLANDS:  Unremarkable  KIDNEYS/URETERS:  Unremarkable  No hydronephrosis  STOMACH AND BOWEL:  Unremarkable  APPENDIX:  No findings to suggest appendicitis  ABDOMINOPELVIC CAVITY:  There is a small volume of free pelvic fluid, likely physiologic given the patient's age and gender  No pneumoperitoneum  No lymphadenopathy  VESSELS:  Unremarkable for patient's age  PELVIS REPRODUCTIVE ORGANS:  Unremarkable for patient's age  URINARY BLADDER:  Unremarkable  ABDOMINAL WALL/INGUINAL REGIONS:  Unremarkable  OSSEOUS STRUCTURES:  No acute fracture or destructive osseous lesion  Impression: No acute inflammatory changes in the abdomen or pelvis  Hepatomegaly and hepatic steatosis  Workstation performed: [id number]

## 2023-03-01 ENCOUNTER — HOSPITAL ENCOUNTER (OUTPATIENT)
Dept: ULTRASOUND IMAGING | Facility: HOSPITAL | Age: 28
Discharge: HOME | End: 2023-03-01
Attending: INTERNAL MEDICINE

## 2023-03-01 DIAGNOSIS — R16.0 ENLARGED LIVER: ICD-10-CM

## 2023-03-22 ENCOUNTER — HOSPITAL ENCOUNTER (EMERGENCY)
Facility: HOSPITAL | Age: 28
Discharge: HOME | End: 2023-03-22
Attending: EMERGENCY MEDICINE

## 2023-03-22 VITALS
BODY MASS INDEX: 32.95 KG/M2 | TEMPERATURE: 99.1 F | DIASTOLIC BLOOD PRESSURE: 75 MMHG | SYSTOLIC BLOOD PRESSURE: 134 MMHG | WEIGHT: 163.14 LBS | OXYGEN SATURATION: 100 % | RESPIRATION RATE: 18 BRPM | HEART RATE: 109 BPM

## 2023-03-22 DIAGNOSIS — U07.1 COVID: ICD-10-CM

## 2023-03-22 DIAGNOSIS — J02.0 STREP PHARYNGITIS: Primary | ICD-10-CM

## 2023-03-22 LAB
BACTERIA UR QL AUTO: ABNORMAL /HPF
BILIRUB UR QL STRIP: NEGATIVE
CLARITY UR: CLEAR
COLOR UR: YELLOW
EXT PREGNANCY TEST URINE: NEGATIVE
EXT. CONTROL: NORMAL
FLUAV RNA RESP QL NAA+PROBE: NEGATIVE
FLUBV RNA RESP QL NAA+PROBE: NEGATIVE
GLUCOSE UR STRIP-MCNC: NEGATIVE MG/DL
HGB UR QL STRIP.AUTO: ABNORMAL
KETONES UR STRIP-MCNC: NEGATIVE MG/DL
LEUKOCYTE ESTERASE UR QL STRIP: NEGATIVE
MUCOUS THREADS UR QL AUTO: ABNORMAL
NITRITE UR QL STRIP: NEGATIVE
NON-SQ EPI CELLS URNS QL MICRO: ABNORMAL /HPF
PH UR STRIP.AUTO: 5.5 [PH] (ref 4.5–8)
PROT UR STRIP-MCNC: NEGATIVE MG/DL
RBC #/AREA URNS AUTO: ABNORMAL /HPF
RSV RNA RESP QL NAA+PROBE: NEGATIVE
S PYO DNA THROAT QL NAA+PROBE: DETECTED
SARS-COV-2 RNA RESP QL NAA+PROBE: POSITIVE
SP GR UR STRIP.AUTO: 1.01 (ref 1–1.03)
UROBILINOGEN UR QL STRIP.AUTO: 0.2 E.U./DL
WBC #/AREA URNS AUTO: ABNORMAL /HPF

## 2023-03-22 RX ORDER — ALBUTEROL SULFATE 90 UG/1
2 AEROSOL, METERED RESPIRATORY (INHALATION) ONCE
Status: COMPLETED | OUTPATIENT
Start: 2023-03-22 | End: 2023-03-22

## 2023-03-22 RX ORDER — IBUPROFEN 600 MG/1
600 TABLET ORAL EVERY 6 HOURS PRN
Qty: 30 TABLET | Refills: 0 | Status: SHIPPED | OUTPATIENT
Start: 2023-03-22 | End: 2023-04-01

## 2023-03-22 RX ORDER — AMOXICILLIN 500 MG/1
500 CAPSULE ORAL 3 TIMES DAILY
Qty: 21 CAPSULE | Refills: 0 | Status: SHIPPED | OUTPATIENT
Start: 2023-03-22 | End: 2023-03-29

## 2023-03-22 RX ADMIN — ALBUTEROL SULFATE 2 PUFF: 90 AEROSOL, METERED RESPIRATORY (INHALATION) at 19:39

## 2023-03-22 NOTE — Clinical Note
Leslie Rae was seen and treated in our emergency department on 3/22/2023  Diagnosis:     María    She may return on this date: 03/25/2023         If you have any questions or concerns, please don't hesitate to call        Beba Vanessa PA-C    ______________________________           _______________          _______________  Hospital Representative                              Date                                Time

## 2023-03-22 NOTE — Clinical Note
Aleah Patel was seen and treated in our emergency department on 3/22/2023  Diagnosis:     María    She may return on this date: 03/25/2023         If you have any questions or concerns, please don't hesitate to call        Beba Vanessa PA-C    ______________________________           _______________          _______________  Hospital Representative                              Date                                Time

## 2023-03-22 NOTE — DISCHARGE INSTRUCTIONS
Take amoxicillin as directed for the strep throat  Tylenol or Motrin for fevers/pain  Saline spray for congestion you may use Mucinex for cough and congestion increase, fluids follow-up with the family doctor  Return to the emergency department for worsening symptoms

## 2023-03-22 NOTE — Clinical Note
Galina Chanel was seen and treated in our emergency department on 3/22/2023  Diagnosis:     María    She may return on this date: Your COVID test is positive  You need to quarantine in your house for a minimum of 7 days from symptom onset and you must be symptom free x 24 hrs prior to discontinuing your quarantine  Family members and others who you were in close contact with who are not immunized also need to quarantine for 10 days from their last exposure to you  Please FU with your family doctor  If you have any questions or concerns, please don't hesitate to call        Beba Vanessa PA-C    ______________________________           _______________          _______________  Hospital Representative                              Date                                Time

## 2023-03-22 NOTE — ED PROVIDER NOTES
History  Chief Complaint   Patient presents with   • Fever - 9 weeks to 74 years     Pt reports headache, chills, sore throat, and body aches for 1 day     Patient presents emergency department with a sore throat body aches and chills that started last night and has been worse today  Patient taking over-the-counter medications the fever kept coming back and so she came in for evaluation  Patient admits to some urinary frequency  No dysuria  Patient is due for her menstrual cycle now  No nausea or vomiting or diarrhea but she has had reduced appetite but has been able to drink well  Prior to Admission Medications   Prescriptions Last Dose Informant Patient Reported? Taking?   lidocaine (LMX) 4 % cream   No No   Sig: Apply topically as needed for moderate pain   Patient not taking: Reported on 2023   naproxen (NAPROSYN) 250 mg tablet   No No   Sig: Take 1 tablet (250 mg total) by mouth 3 (three) times a day with meals for 7 days   naproxen (NAPROSYN) 500 mg tablet   No No   Sig: Take 1 tablet (500 mg total) by mouth 2 (two) times a day with meals   ondansetron (Zofran ODT) 4 mg disintegrating tablet   No No   Sig: Take 1 tablet (4 mg total) by mouth every 6 (six) hours as needed for nausea or vomiting   Patient not taking: Reported on 2023      Facility-Administered Medications: None       Past Medical History:   Diagnosis Date   • Fatty liver    • Migraine        Past Surgical History:   Procedure Laterality Date   •  SECTION     • LYMPHADENECTOMY         History reviewed  No pertinent family history  I have reviewed and agree with the history as documented      E-Cigarette/Vaping   • E-Cigarette Use Never User      E-Cigarette/Vaping Substances   • Nicotine No    • THC No    • CBD No    • Flavoring No    • Other No    • Unknown No      Social History     Tobacco Use   • Smoking status: Never   • Smokeless tobacco: Never   Vaping Use   • Vaping Use: Never used   Substance Use Topics • Alcohol use: Not Currently   • Drug use: Never       Review of Systems   Constitutional: Positive for chills and fever  HENT: Positive for congestion and sore throat  Cardiovascular: Negative  Gastrointestinal: Negative for abdominal pain, diarrhea and vomiting  Genitourinary: Negative for difficulty urinating  All other systems reviewed and are negative  Physical Exam  Physical Exam  Vitals and nursing note reviewed  Constitutional:       Appearance: She is well-developed  HENT:      Head: Normocephalic and atraumatic  Right Ear: Tympanic membrane and external ear normal       Left Ear: Tympanic membrane and external ear normal       Mouth/Throat:      Pharynx: Posterior oropharyngeal erythema present  No oropharyngeal exudate  Eyes:      Conjunctiva/sclera: Conjunctivae normal    Cardiovascular:      Rate and Rhythm: Normal rate and regular rhythm  Heart sounds: Normal heart sounds  Pulmonary:      Effort: Pulmonary effort is normal       Breath sounds: Normal breath sounds  Abdominal:      General: Bowel sounds are normal       Palpations: Abdomen is soft  Tenderness: There is no abdominal tenderness  Musculoskeletal:         General: Normal range of motion  Cervical back: Neck supple  Lymphadenopathy:      Cervical: No cervical adenopathy  Skin:     General: Skin is warm  Findings: No rash  Neurological:      Mental Status: She is alert     Psychiatric:         Behavior: Behavior normal          Vital Signs  ED Triage Vitals   Temperature Pulse Respirations Blood Pressure SpO2   03/22/23 1758 03/22/23 1758 03/22/23 1758 03/22/23 1758 03/22/23 1758   99 1 °F (37 3 °C) (!) 122 18 139/91 99 %      Temp Source Heart Rate Source Patient Position - Orthostatic VS BP Location FiO2 (%)   03/22/23 1758 03/22/23 1758 03/22/23 1943 03/22/23 1943 --   Oral Monitor Sitting Right arm       Pain Score       03/22/23 1758       7           Vitals:    03/22/23 1758 03/22/23 1943   BP: 139/91 134/75   Pulse: (!) 122 (!) 109   Patient Position - Orthostatic VS:  Sitting         Visual Acuity      ED Medications  Medications   albuterol (PROVENTIL HFA,VENTOLIN HFA) inhaler 2 puff (2 puffs Inhalation Given 3/22/23 1939)       Diagnostic Studies  Results Reviewed     Procedure Component Value Units Date/Time    Urine Microscopic [[id number]]  (Abnormal) Collected: 03/22/23 1919    Lab Status: Final result Specimen: Urine, Clean Catch Updated: 03/22/23 1958     RBC, UA None Seen /hpf      WBC, UA 1-2 /hpf      Epithelial Cells Occasional /hpf      Bacteria, UA Occasional /hpf      MUCUS THREADS Occasional    FLU/RSV/COVID - if FLU/RSV clinically relevant [[id number]]  (Abnormal) Collected: 03/22/23 1858    Lab Status: Final result Specimen: Nares from Nose Updated: 03/22/23 1940     SARS-CoV-2 Positive     INFLUENZA A PCR Negative     INFLUENZA B PCR Negative     RSV PCR Negative    Narrative:      FOR PEDIATRIC PATIENTS - copy/paste COVID Guidelines URL to browser: [email or web address]  [employer]    SARS-CoV-2 assay is a Nucleic Acid Amplification assay intended for the  qualitative detection of nucleic acid from SARS-CoV-2 in nasopharyngeal  swabs  Results are for the presumptive identification of SARS-CoV-2 RNA  Positive results are indicative of infection with SARS-CoV-2, the virus  causing COVID-19, but do not rule out bacterial infection or co-infection  with other viruses  Laboratories within the [place] and its  territories are required to report all positive results to the appropriate  public health authorities  Negative results do not preclude SARS-CoV-2  infection and should not be used as the sole basis for treatment or other  patient management decisions  Negative results must be combined with  clinical observations, patient history, and epidemiological information    This test has not been FDA cleared or approved  This test has been authorized by FDA under an Emergency Use Authorization  (EUA)  This test is only authorized for the duration of time the  declaration that circumstances exist justifying the authorization of the  emergency use of an in vitro diagnostic tests for detection of SARS-CoV-2  virus and/or diagnosis of COVID-19 infection under section 564(b)(1) of  the Act, 21 U  S C  407BSY-6(I)(7), unless the authorization is terminated  or revoked sooner  The test has been validated but independent review by FDA  and CLIA is pending  Test performed using [employer] GeneXpert: This RT-PCR assay targets N2,  a region unique to SARS-CoV-2  A conserved region in the E-gene was chosen  for pan-Sarbecovirus detection which includes SARS-CoV-2  According to CMS-2020-01-R, this platform meets the definition of high-throughput technology      Strep A PCR [[id number]]  (Abnormal) Collected: 03/22/23 1858    Lab Status: Final result Specimen: Throat Updated: 03/22/23 1925     STREP A PCR Detected    POCT pregnancy, urine [[id number]]  (Normal) Resulted: 03/22/23 1924    Lab Status: Final result Updated: 03/22/23 1924     EXT Preg Test, Ur Negative     Control Valid    Urine Macroscopic, POC [[id number]]  (Abnormal) Collected: 03/22/23 1919    Lab Status: Final result Specimen: Urine Updated: 03/22/23 1920     Color, UA Yellow     Clarity, UA Clear     pH, UA 5 5     Leukocytes, UA Negative     Nitrite, UA Negative     Protein, UA Negative mg/dl      Glucose, UA Negative mg/dl      Ketones, UA Negative mg/dl      Urobilinogen, UA 0 2 E U /dl      Bilirubin, UA Negative     Occult Blood, UA Trace     Specific [place], UA 1 015    Narrative:      CLINITEK RESULT                 No orders to display              Procedures  Procedures         ED Course  ED Course as of 03/22/23 2046   Wed Mar 22, 2023   1933 STREP A PCR(!): Detected  We will treat for strep   1934 Nitrite, UA: Negative  Urine does not appear infected Medical Decision Making  We will test for strep and COVID  We will also check for a UTI  Patient's heart rate was mildly elevated suspect secondary to patient's low-grade fevers  Patient is positive for strep we will treat  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 [employer]  Strep pharyngitis: acute illness or injury  Amount and/or Complexity of Data Reviewed  Labs: ordered  Decision-making details documented in ED Course  Details: Called patient with positive COVID  Patient has active MyChart and we will be able to show her employer information  -Resulted after patient was discharged      Risk  OTC drugs  Prescription drug management  Disposition  Final diagnoses:   Strep pharyngitis     Time reflects when diagnosis was documented in both MDM as applicable and the Disposition within this note     Time User Action Codes Description Comment    3/22/2023  7:32 PM Beba Vanessa [J02 0] Strep pharyngitis       ED Disposition     ED Disposition   Discharge    Condition   Stable    Date/Time   Wed Mar 22, 2023  7:32 PM    Comment   Pedrito Busch discharge to home/self care                 Follow-up Information     Follow up With Specialties Details [place] [id number], MD Family Medicine   [address] [place] [address]  49  [id number]  [phone]            Patient's Medications   Discharge Prescriptions    AMOXICILLIN (AMOXIL) 500 MG CAPSULE    Take 1 capsule (500 mg total) by mouth 3 (three) times a day for 7 days       Start Date: 3/22/2023 End Date: 3/29/2023       Order Dose: 500 mg       Quantity: 21 capsule    Refills: 0    IBUPROFEN (MOTRIN) 600 MG TABLET    Take 1 tablet (600 mg total) by mouth every 6 (six) hours as needed for mild pain for up to 10 days       Start Date: 3/22/2023 End Date: 4/1/2023       Order Dose: 600 mg       Quantity: 30 tablet    Refills: 0       No discharge procedures on file      PDMP Review     None          ED Provider  Electronically Signed by           Aviva Oconnell PA-C  03/22/23 9210

## 2023-05-03 ENCOUNTER — OFFICE VISIT (OUTPATIENT)
Dept: FAMILY MEDICINE CLINIC | Facility: CLINIC | Age: 28
End: 2023-05-03

## 2023-05-03 VITALS
HEIGHT: 59 IN | RESPIRATION RATE: 16 BRPM | DIASTOLIC BLOOD PRESSURE: 70 MMHG | TEMPERATURE: 97.9 F | WEIGHT: 160 LBS | OXYGEN SATURATION: 98 % | BODY MASS INDEX: 32.25 KG/M2 | SYSTOLIC BLOOD PRESSURE: 120 MMHG | HEART RATE: 63 BPM

## 2023-05-03 DIAGNOSIS — Z12.4 ENCOUNTER FOR SCREENING FOR CERVICAL CANCER: ICD-10-CM

## 2023-05-03 DIAGNOSIS — Z00.00 HEALTHCARE MAINTENANCE: ICD-10-CM

## 2023-05-03 DIAGNOSIS — G43.809 OTHER MIGRAINE WITHOUT STATUS MIGRAINOSUS, NOT INTRACTABLE: Primary | ICD-10-CM

## 2023-05-03 DIAGNOSIS — B37.9 YEAST INFECTION: ICD-10-CM

## 2023-05-03 DIAGNOSIS — F32.A DEPRESSION, UNSPECIFIED DEPRESSION TYPE: ICD-10-CM

## 2023-05-03 DIAGNOSIS — Z31.9 INFERTILITY MANAGEMENT: ICD-10-CM

## 2023-05-03 DIAGNOSIS — K76.0 FATTY LIVER: ICD-10-CM

## 2023-05-03 RX ORDER — SUMATRIPTAN 50 MG/1
50 TABLET, FILM COATED ORAL ONCE AS NEEDED
Qty: 9 TABLET | Refills: 0 | Status: SHIPPED | OUTPATIENT
Start: 2023-05-03

## 2023-05-03 RX ORDER — FLUCONAZOLE 150 MG/1
150 TABLET ORAL ONCE
Qty: 1 TABLET | Refills: 0 | Status: SHIPPED | OUTPATIENT
Start: 2023-05-03 | End: 2023-05-03

## 2023-05-03 RX ORDER — BUPROPION HYDROCHLORIDE 150 MG/1
150 TABLET ORAL DAILY
Qty: 30 TABLET | Refills: 1 | Status: SHIPPED | OUTPATIENT
Start: 2023-05-03

## 2023-05-03 NOTE — PROGRESS NOTES
Name: Celia Kc      : 1995      MRN: [id number]  Encounter Provider: BING Martínez  Encounter Date: 5/3/2023   Encounter department: [address] [place] [address]  Other migraine without status migrainosus, not intractable  Assessment & Plan:  - Not well controlled  - Prescription sent for Imitrex to be taken at onset of migraine  Discussed side effects  Limit use to no more than twice weekly  - Recommend follow up in 1 month  Orders:  -     SUMAtriptan (Imitrex) 50 mg tablet; Take 1 tablet (50 mg total) by mouth once as needed for migraine for up to 1 dose    2  Depression, unspecified depression type  Assessment & Plan:  - Not well controlled  - Will start Wellbutrin 150 mg daily  Discussed side effects    - Recommend follow up in 1 month  Orders:  -     buPROPion (Wellbutrin XL) 150 mg 24 hr tablet; Take 1 tablet (150 mg total) by mouth daily    3  Fatty liver  Assessment & Plan:  - Encouraged healthy diet and regular exercise  - Will continue to monitor  4  Yeast infection  Assessment & Plan:  - Prescription sent for Diflucan  Orders:  -     fluconazole (DIFLUCAN) 150 mg tablet; Take 1 tablet (150 mg total) by mouth once for 1 dose    5  Infertility management  Assessment & Plan:  - Referred to OB/GYN for further evaluation and management  Orders:  -     Ambulatory Referral to Obstetrics / Gynecology; Future    6  Encounter for screening for cervical cancer  -     Ambulatory Referral to Obstetrics / Gynecology; Future    7  Healthcare maintenance  -     CBC and differential; Future  -     Comprehensive metabolic panel; Future  -     Lipid Panel with Direct LDL reflex; Future  -     TSH, 3rd generation with Free T4 reflex; Future           Subjective     Patient with PMH of migraines and fatty liver presents today to establish care  She takes no medications on a daily basis  She is getting headaches almost every day   Endorses sensitivity to light and sound as well as occasional nausea and vomiting  She has been taking Excedrin migraine which isn't helping  She used to be prescribed Imitrex but it has been several years since she has taken it  Also reports increased vaginal discharge  No fishy odor  Minimal vaginal itching  Has been trying to conceive for about 2 years  Does have an 6year old son  Does not see gynecologist routinely  Last pap was in 2018  Also reports increased feelings of anxiety and depression  States she is getting aggravated more easily  She is dealing with family issues - family lives in [place]  She also doesn't have a lot of support in the area other than her boyfriend  Was on medication in the past but can't remember what it was  Female  Problem  The patient's primary symptoms include vaginal discharge  The patient's pertinent negatives include no genital itching, genital lesions, genital odor, genital rash, missed menses or pelvic pain  This is a recurrent problem  The current episode started 1 to 4 weeks ago  The problem occurs constantly  The problem has been unchanged  The pain is moderate  The problem affects both sides  She is not pregnant  Associated symptoms include headaches and painful intercourse  Pertinent negatives include no abdominal pain, anorexia, back pain, chills, constipation, diarrhea, discolored urine, dysuria, fever, flank pain, frequency, hematuria, joint pain, joint swelling, nausea, rash, sore throat, urgency or vomiting  The vaginal discharge was malodorous  She has not been passing clots  She has not been passing tissue  The symptoms are aggravated by intercourse  She is sexually active  No, her partner does not have an STD  She uses nothing for contraception  Her menstrual history has been irregular  Review of Systems   Constitutional: Negative for chills and fever  HENT: Negative for sore throat      Gastrointestinal: Negative for abdominal pain, anorexia, constipation, diarrhea, nausea and vomiting  Genitourinary: Positive for vaginal discharge  Negative for dysuria, flank pain, frequency, hematuria, missed menses, pelvic pain and urgency  Musculoskeletal: Negative for back pain and joint pain  Skin: Negative for rash  Neurological: Positive for headaches  Psychiatric/Behavioral: Positive for agitation and dysphoric mood  Negative for self-injury and suicidal ideas  The patient is nervous/anxious  Past Medical History:   Diagnosis Date    Fatty liver     Migraine      Past Surgical History:   Procedure Laterality Date     SECTION      LYMPHADENECTOMY       History reviewed  No pertinent family history    Social History     Socioeconomic History    Marital status: Single     Spouse name: None    Number of children: None    Years of education: None    Highest education level: None   Occupational History    None   Tobacco Use    Smoking status: Never    Smokeless tobacco: Never   Vaping Use    Vaping Use: Never used   Substance and Sexual Activity    Alcohol use: Not Currently    Drug use: Never    Sexual activity: Yes     Partners: Male   Other Topics Concern    None   Social History Narrative    None     Social Determinants of Health     Financial Resource Strain: Not on file   Food Insecurity: Not on file   Transportation Needs: Not on file   Physical Activity: Not on file   Stress: Not on file   Social Connections: Not on file   Intimate Partner Violence: Not on file   Housing Stability: Not on file     Current Outpatient Medications on File Prior to Visit   Medication Sig    ibuprofen (MOTRIN) 600 mg tablet Take 1 tablet (600 mg total) by mouth every 6 (six) hours as needed for mild pain for up to 10 days    lidocaine (LMX) 4 % cream Apply topically as needed for moderate pain (Patient not taking: Reported on 2023)    naproxen (NAPROSYN) 250 mg tablet Take 1 tablet (250 mg total) by mouth 3 (three) times a day with "meals for 7 days    ondansetron (Zofran ODT) 4 mg disintegrating tablet Take 1 tablet (4 mg total) by mouth every 6 (six) hours as needed for nausea or vomiting (Patient not taking: Reported on 2/24/2023)     No Known Allergies  Immunization History   Administered Date(s) Administered    DTaP 1995, 1993, 10/06/1994, 10/19/1994, 03/24/1998    HPV Quadrivalent 09/16/2008, 11/19/2008, 03/10/2009    Hep A, ped/adol, 2 dose 06/29/2009, 12/30/2009    Hep B, Adolescent or Pediatric 1993, 1995, 1993    Hib (PRP-T) 1993, 1993, 1993, 10/19/1994    INFLUENZA 10/02/2019, 10/25/2021    IPV 1993, 1993, 10/19/1994, 03/24/1998    Influenza Injectable, MDCK, Preservative Free, Quadrivalent, 0 5 mL 12/05/2022    Influenza, seasonal, injectable 11/10/2014, 10/22/2018, 10/02/2019, 10/25/2021    MMR 07/20/1994, 05/08/1997    Meningococcal MCV4P 06/29/2009    Tdap 03/10/2009, 02/02/2015    Tuberculin Skin Test-PPD Intradermal 03/10/2009, 07/27/2010, 08/09/2010, 06/25/2012    Varicella 11/21/2003, 09/16/2008       Objective     /70 (BP Location: Left arm, Patient Position: Sitting, Cuff Size: Large)   Pulse 63   Temp 97 9 °F (36 6 °C) (Tympanic)   Resp 16   Ht 4' 11\" (1 499 m)   Wt 72 6 kg (160 lb)   SpO2 98%   BMI 32 32 kg/m²     Physical Exam  Vitals and nursing note reviewed  Constitutional:       General: She is not in acute distress  Appearance: Normal appearance  She is not ill-appearing  HENT:      Head: Normocephalic and atraumatic  Right Ear: Tympanic membrane, ear canal and external ear normal       Left Ear: Tympanic membrane, ear canal and external ear normal       Nose: Nose normal    Eyes:      Conjunctiva/sclera: Conjunctivae normal    Cardiovascular:      Rate and Rhythm: Normal rate and regular rhythm  Heart sounds: Normal heart sounds     Pulmonary:      Effort: Pulmonary effort is normal       Breath sounds: Normal " breath sounds  Musculoskeletal:         General: Normal range of motion  Cervical back: Normal range of motion  Lymphadenopathy:      Cervical: No cervical adenopathy  Skin:     General: Skin is warm and dry  Neurological:      Mental Status: She is alert and oriented to person, place, and time  Cranial Nerves: No cranial nerve deficit     Psychiatric:         Mood and Affect: Mood normal          Behavior: Behavior normal        BING Lepe

## 2023-05-04 PROBLEM — B37.9 YEAST INFECTION: Status: ACTIVE | Noted: 2023-05-04

## 2023-05-04 PROBLEM — Z12.4 ENCOUNTER FOR SCREENING FOR CERVICAL CANCER: Status: ACTIVE | Noted: 2023-05-04

## 2023-05-04 PROBLEM — Z00.00 HEALTHCARE MAINTENANCE: Status: ACTIVE | Noted: 2023-05-04

## 2023-05-04 PROBLEM — G43.909 MIGRAINE: Status: ACTIVE | Noted: 2023-05-04

## 2023-05-04 PROBLEM — F32.A DEPRESSION: Status: ACTIVE | Noted: 2023-05-04

## 2023-05-04 PROBLEM — Z31.9 INFERTILITY MANAGEMENT: Status: ACTIVE | Noted: 2023-05-04

## 2023-05-04 NOTE — ASSESSMENT & PLAN NOTE
- Not well controlled  - Will start Wellbutrin 150 mg daily  Discussed side effects    - Recommend follow up in 1 month

## 2023-05-04 NOTE — ASSESSMENT & PLAN NOTE
- Not well controlled  - Prescription sent for Imitrex to be taken at onset of migraine  Discussed side effects  Limit use to no more than twice weekly  - Recommend follow up in 1 month

## 2023-06-02 ENCOUNTER — TELEPHONE (OUTPATIENT)
Dept: FAMILY MEDICINE CLINIC | Facility: CLINIC | Age: 28
End: 2023-06-02

## 2023-06-02 NOTE — TELEPHONE ENCOUNTER
----- Message from Varghese Aguilar sent at 6/2/2023  7:12 AM EDT -----  Regarding: Wellbutrin  Contact: [phone]  I started using the wellbutrin yesterday because i had to much anxiety and after i took it my pulse rate is fast is that normal with taking that?

## 2023-06-07 ENCOUNTER — OFFICE VISIT (OUTPATIENT)
Dept: FAMILY MEDICINE CLINIC | Facility: CLINIC | Age: 28
End: 2023-06-07
Payer: COMMERCIAL

## 2023-06-07 VITALS
HEIGHT: 59 IN | WEIGHT: 158.4 LBS | BODY MASS INDEX: 31.93 KG/M2 | TEMPERATURE: 97.8 F | HEART RATE: 71 BPM | SYSTOLIC BLOOD PRESSURE: 118 MMHG | DIASTOLIC BLOOD PRESSURE: 76 MMHG | OXYGEN SATURATION: 99 % | RESPIRATION RATE: 16 BRPM

## 2023-06-07 DIAGNOSIS — F41.9 ANXIETY: Primary | ICD-10-CM

## 2023-06-07 DIAGNOSIS — F32.A DEPRESSION, UNSPECIFIED DEPRESSION TYPE: ICD-10-CM

## 2023-06-07 DIAGNOSIS — Z00.00 HEALTHCARE MAINTENANCE: ICD-10-CM

## 2023-06-07 DIAGNOSIS — M94.0 COSTOCHONDRITIS: ICD-10-CM

## 2023-06-07 PROCEDURE — 99214 OFFICE O/P EST MOD 30 MIN: CPT | Performed by: NURSE PRACTITIONER

## 2023-06-07 PROCEDURE — 99395 PREV VISIT EST AGE 18-39: CPT | Performed by: NURSE PRACTITIONER

## 2023-06-07 RX ORDER — NAPROXEN 500 MG/1
500 TABLET ORAL 2 TIMES DAILY PRN
Qty: 60 TABLET | Refills: 0 | Status: SHIPPED | OUTPATIENT
Start: 2023-06-07

## 2023-06-07 RX ORDER — ALPRAZOLAM 0.5 MG/1
0.5 TABLET ORAL DAILY PRN
Qty: 30 TABLET | Refills: 0 | Status: SHIPPED | OUTPATIENT
Start: 2023-06-07

## 2023-06-07 NOTE — ASSESSMENT & PLAN NOTE
- Prescription sent for Naproxen twice daily as needed  - Can alternate between ice and heat  - Perform stretching exercises  - Will continue to monitor

## 2023-06-07 NOTE — ASSESSMENT & PLAN NOTE
- Reviewed immunizations and screenings  - Discussed regular dental and vision exams    - Previously referred to GYN - she is planning on scheduling appointment    - Routine labs ordered

## 2023-06-07 NOTE — ASSESSMENT & PLAN NOTE
- Not well controlled  - Advised to start Wellbutrin 150 mg daily  Discussed side effects   - Recommend follow up in 1 month

## 2023-06-07 NOTE — PATIENT INSTRUCTIONS
Costochondritis   WHAT YOU NEED TO KNOW:   Costochondritis is a condition that causes pain in the cartilage that connect your ribs to your sternum (breastbone)  Cartilage is the tough, bendable tissue that protects your bones  DISCHARGE INSTRUCTIONS:   Medicines:   Acetaminophen: This medicine decreases pain  Acetaminophen is available without a doctor's order  Ask how much to take and how often to take it  Follow directions  Acetaminophen can cause liver damage if not taken correctly  NSAIDs , such as ibuprofen, help decrease swelling, pain, and fever  This medicine is available with or without a doctor's order  NSAIDs can cause stomach bleeding or kidney problems in certain people  If you take blood thinner medicine, always ask if NSAIDs are safe for you  Always read the medicine label and follow directions  Do not give these medicines to children younger than 6 months without direction from a healthcare provider  Take your medicine as directed  Contact your healthcare provider if you think your medicine is not helping or if you have side effects  Tell your provider if you are allergic to any medicine  Keep a list of the medicines, vitamins, and herbs you take  Include the amounts, and when and why you take them  Bring the list or the pill bottles to follow-up visits  Carry your medicine list with you in case of an emergency  Follow up with your doctor as directed:  Write down your questions so you remember to ask them during your visits  Rest:  You may need to get more rest  Learn which movements and activities cause pain, and avoid doing them  Do not carry objects, such as a purse or backpack, if this is painful  Avoid activities such as rowing and weightlifting until your pain decreases or goes away  Ask which activities are best for you to do while you recover  Heat:  Heat helps decrease pain in some patients   Apply heat on the area for 20 to 30 minutes every 2 hours for as many days as directed  Ice:  Ice helps decrease swelling and pain  Ice may also help prevent tissue damage  Use an ice pack, or put crushed ice in a plastic bag  Cover it with a towel and place it on the painful area for 15 to 20 minutes every hour or as directed  Stretching exercises:  Gentle stretching may help your symptoms   a doorway and put your hands on the door frame at the level of your ears or shoulders  Take 1 step forward and gently stretch your chest  Try this with your hands higher up on the doorway  Contact your healthcare provider if:   You have a fever  The painful areas of your chest look swollen, red, and feel warm to the touch  You cannot sleep because of the pain  You have questions or concerns about your condition or care  © Copyright [facility] 2022 Information is for End User's use only and may not be sold, redistributed or otherwise used for commercial purposes  The above information is an  only  It is not intended as medical advice for individual conditions or treatments  Talk to your doctor, nurse or pharmacist before following any medical regimen to see if it is safe and effective for you

## 2023-06-07 NOTE — ASSESSMENT & PLAN NOTE
- Not well controlled  - Advised to start Wellbutrin 150 mg daily  Discussed side effects   - Prescription sent for Xanax 0 5 mg daily as needed  Discussed addictive nature of medication    - Consider referral to Psychiatrist    - Recommend follow up in 1 month

## 2023-06-07 NOTE — PROGRESS NOTES
Name: Karol Arevalo      : 1995      MRN: [id number]  Encounter Provider: BING Sandhu  Encounter Date: 2023   Encounter department: [address] [place] [address]  Anxiety  Assessment & Plan:  - Not well controlled  - Advised to start Wellbutrin 150 mg daily  Discussed side effects   - Prescription sent for Xanax 0 5 mg daily as needed  Discussed addictive nature of medication    - Consider referral to Psychiatrist    - Recommend follow up in 1 month  Orders:  -     ALPRAZolam (XANAX) 0 5 mg tablet; Take 1 tablet (0 5 mg total) by mouth daily as needed for anxiety    2  Depression, unspecified depression type  Assessment & Plan:  - Not well controlled  - Advised to start Wellbutrin 150 mg daily  Discussed side effects   - Recommend follow up in 1 month  3  Costochondritis  Assessment & Plan:  - Prescription sent for Naproxen twice daily as needed  - Can alternate between ice and heat  - Perform stretching exercises  - Will continue to monitor  Orders:  -     naproxen (NAPROSYN) 500 mg tablet; Take 1 tablet (500 mg total) by mouth 2 (two) times a day as needed for mild pain    4  Healthcare maintenance  Assessment & Plan:  - Reviewed immunizations and screenings  - Discussed regular dental and vision exams    - Previously referred to GYN - she is planning on scheduling appointment    - Routine labs ordered  Subjective     Patient presents today for 1 month follow up regarding migraines and depression  She complains of no migraines but does have complaints of continued anxiety and depression  She did not start taking the Wellbutrin as prescribed  States one night she felt like she was getting a panic attack  She took 1 dose of Wellbutrin and felt like her heart was racing and she didn't feel like herself  She did not take the medication again  She also has complaints today of chest pain which is worse with breathing   Pain is radiating up to her right shoulder  She denies any cough  States her chest hurts if she presses on it  Denies any injury or trauma  Does have a history of costochondritis  She denies any other concerns or complaints today  Creig Oats [address]  Contact the [address] [place] [address] Street    YOB: 1995   Recent Address:  Status of States Queried:  View Details   Status of States Queried                      RENEE Chang Records (0)    Report Criteria  Linked Records  ? No patient matching the search criteria submitted was identified  If you believe a dispensation record exists for this patient, we recommend you manually search your state PDMP site with different search criteria and/or utilize partial search (if available)  Status of States Queried                      Tile cannot be moved due to a restriction by the [facility] [employer]  Total Prescriptions  0    Total Private Pay  0    Total Prescribers  0    Total Pharmacies  0    Opioids* (excluding Buprenorphine)  Current Qty  0    Current MME/day  0 00    30 Day Avg MME/day  0 00    Buprenorphine*  Current Qty  0    Current mg/day  0 00    30 Day Avg mg/day  0 00      Tile cannot be moved due to a restriction by the Admin  Prescriptions   Total: 0   Private Pay: 0   Showing 1-0 of 0 Items   View   15 Items    1 of 0   Filled  Written  Sold  ID  Drug  QTY  Days  Prescriber  RX #  Dispenser  Refill  Daily Dose*  Pymt Type      Disclaimer   Disclaimer  Showing 1-0 of 0 Items   View   15 Items         Review of Systems   Constitutional: Negative for fatigue and fever  HENT: Negative for congestion, rhinorrhea and trouble swallowing  Eyes: Negative for visual disturbance  Respiratory: Negative for cough and shortness of breath  Cardiovascular: Positive for chest pain  Negative for palpitations  Gastrointestinal: Negative for abdominal pain and blood in stool     Endocrine: Negative for cold intolerance and heat intolerance  Genitourinary: Negative for difficulty urinating and dysuria  Musculoskeletal: Negative for gait problem  Skin: Negative for rash  Neurological: Negative for dizziness, syncope and headaches  Hematological: Negative for adenopathy  Psychiatric/Behavioral: Positive for dysphoric mood  Negative for behavioral problems  The patient is nervous/anxious  Past Medical History:   Diagnosis Date   • Fatty liver    • Migraine      Past Surgical History:   Procedure Laterality Date   •  SECTION     • LYMPHADENECTOMY       History reviewed  No pertinent family history    Social History     Socioeconomic History   • Marital status: Single     Spouse name: None   • Number of children: None   • Years of education: None   • Highest education level: None   Occupational History   • None   Tobacco Use   • Smoking status: Never   • Smokeless tobacco: Never   Vaping Use   • Vaping Use: Never used   Substance and Sexual Activity   • Alcohol use: Not Currently   • Drug use: Never   • Sexual activity: Yes     Partners: Male   Other Topics Concern   • None   Social History Narrative   • None     Social Determinants of Health     Financial Resource Strain: Not on file   Food Insecurity: Not on file   Transportation Needs: Not on file   Physical Activity: Not on file   Stress: Not on file   Social Connections: Not on file   Intimate Partner Violence: Not on file   Housing Stability: Not on file     Current Outpatient Medications on File Prior to Visit   Medication Sig   • SUMAtriptan (Imitrex) 50 mg tablet Take 1 tablet (50 mg total) by mouth once as needed for migraine for up to 1 dose   • buPROPion (Wellbutrin XL) 150 mg 24 hr tablet Take 1 tablet (150 mg total) by mouth daily (Patient not taking: Reported on 2023)   • ibuprofen (MOTRIN) 600 mg tablet Take 1 tablet (600 mg total) by mouth every 6 (six) hours as needed for mild pain for up to 10 days   • lidocaine (LMX) 4 % cream Apply topically as "needed for moderate pain (Patient not taking: Reported on 2/24/2023)   • ondansetron (Zofran ODT) 4 mg disintegrating tablet Take 1 tablet (4 mg total) by mouth every 6 (six) hours as needed for nausea or vomiting (Patient not taking: Reported on 2/24/2023)     No Known Allergies  Immunization History   Administered Date(s) Administered   • DTaP 1995, 1995, 10/06/1994, 10/19/1994, 03/24/1998   • HPV Quadrivalent 09/16/2008, 11/19/2008, 03/10/2009   • Hep A, ped/adol, 2 dose 06/29/2009, 12/30/2009   • Hep B, Adolescent or Pediatric 1995, 1995, 1995   • Hib (PRP-T) 1995, 1995, 1995, 10/19/1994   • INFLUENZA 10/02/2019, 10/25/2021   • IPV 1995, 1995, 10/19/1994, 03/24/1998   • Influenza Injectable, MDCK, Preservative Free, Quadrivalent, 0 5 mL 12/05/2022   • Influenza, seasonal, injectable 11/10/2014, 10/22/2018, 10/02/2019, 10/25/2021   • MMR 07/20/1994, 05/08/1997   • Meningococcal MCV4P 06/29/2009   • Tdap 03/10/2009, 02/02/2015   • Tuberculin Skin Test-PPD Intradermal 03/10/2009, 07/27/2010, 08/09/2010, 06/25/2012   • Varicella 11/21/2003, 09/16/2008       Objective     /76 (BP Location: Left arm, Patient Position: Sitting, Cuff Size: Standard)   Pulse 71   Temp 97 8 °F (36 6 °C) (Tympanic)   Resp 16   Ht 4' 11\" (1 499 m)   Wt 71 8 kg (158 lb 6 4 oz)   SpO2 99%   BMI 31 99 kg/m²     Physical Exam  Vitals and nursing note reviewed  Constitutional:       General: She is not in acute distress  Appearance: Normal appearance  She is not ill-appearing  HENT:      Head: Normocephalic and atraumatic  Right Ear: Tympanic membrane, ear canal and external ear normal       Left Ear: Tympanic membrane, ear canal and external ear normal       Nose: Nose normal       Mouth/Throat:      Mouth: Mucous membranes are moist    Eyes:      Conjunctiva/sclera: Conjunctivae normal    Cardiovascular:      Rate and Rhythm: Normal rate and regular rhythm   " Heart sounds: Normal heart sounds  Pulmonary:      Effort: Pulmonary effort is normal       Breath sounds: Normal breath sounds  Chest:      Chest wall: Tenderness present  Abdominal:      General: Bowel sounds are normal       Palpations: Abdomen is soft  Musculoskeletal:         General: Normal range of motion  Cervical back: Normal range of motion  Lymphadenopathy:      Cervical: No cervical adenopathy  Skin:     General: Skin is warm and dry  Neurological:      Mental Status: She is alert and oriented to person, place, and time  Cranial Nerves: No cranial nerve deficit  Psychiatric:         Mood and Affect: Mood is anxious  Behavior: Behavior is cooperative         BING Parekh Cap

## 2023-06-08 DIAGNOSIS — F32.A DEPRESSION, UNSPECIFIED DEPRESSION TYPE: ICD-10-CM

## 2023-06-08 RX ORDER — BUPROPION HCL 150 MG
TABLET, EXTENDED RELEASE 24 HR ORAL
Qty: 90 TABLET | Refills: 1 | Status: SHIPPED | OUTPATIENT
Start: 2023-06-08

## 2023-07-03 PROBLEM — Z12.4 ENCOUNTER FOR SCREENING FOR CERVICAL CANCER: Status: RESOLVED | Noted: 2023-05-04 | Resolved: 2023-07-03

## 2023-07-03 PROBLEM — Z00.00 HEALTHCARE MAINTENANCE: Status: RESOLVED | Noted: 2023-05-04 | Resolved: 2023-07-03

## 2023-07-05 DIAGNOSIS — M94.0 COSTOCHONDRITIS: ICD-10-CM

## 2023-07-05 RX ORDER — NAPROXEN 500 MG/1
TABLET ORAL
Qty: 60 TABLET | Refills: 0 | Status: SHIPPED | OUTPATIENT
Start: 2023-07-05

## 2023-07-09 ENCOUNTER — HOSPITAL ENCOUNTER (EMERGENCY)
Facility: HOSPITAL | Age: 28
Discharge: HOME | End: 2023-07-09
Attending: EMERGENCY MEDICINE
Payer: COMMERCIAL

## 2023-07-09 ENCOUNTER — APPOINTMENT (EMERGENCY)
Dept: RADIOLOGY | Facility: HOSPITAL | Age: 28
End: 2023-07-09
Payer: COMMERCIAL

## 2023-07-09 VITALS
WEIGHT: 159.17 LBS | BODY MASS INDEX: 32.15 KG/M2 | RESPIRATION RATE: 16 BRPM | OXYGEN SATURATION: 99 % | DIASTOLIC BLOOD PRESSURE: 74 MMHG | SYSTOLIC BLOOD PRESSURE: 128 MMHG | HEART RATE: 66 BPM | TEMPERATURE: 97.4 F

## 2023-07-09 DIAGNOSIS — R07.89 ATYPICAL CHEST PAIN: Primary | ICD-10-CM

## 2023-07-09 DIAGNOSIS — J45.901 ASTHMA EXACERBATION: ICD-10-CM

## 2023-07-09 DIAGNOSIS — M94.0 COSTOCHONDRITIS: ICD-10-CM

## 2023-07-09 LAB
ANION GAP SERPL CALCULATED.3IONS-SCNC: 5 MMOL/L
ATRIAL RATE: 89 BPM
BASOPHILS # BLD AUTO: 0.06 THOUSANDS/ÂΜL (ref 0–0.1)
BASOPHILS NFR BLD AUTO: 1 % (ref 0–1)
BNP SERPL-MCNC: 42 PG/ML (ref 0–100)
BUN SERPL-MCNC: 9 MG/DL (ref 5–25)
CALCIUM SERPL-MCNC: 8.6 MG/DL (ref 8.4–10.2)
CARDIAC TROPONIN I PNL SERPL HS: <2 NG/L
CHLORIDE SERPL-SCNC: 105 MMOL/L (ref 96–108)
CO2 SERPL-SCNC: 27 MMOL/L (ref 21–32)
CREAT SERPL-MCNC: 0.67 MG/DL (ref 0.6–1.3)
D DIMER PPP FEU-MCNC: <0.27 UG/ML FEU
EOSINOPHIL # BLD AUTO: 0.21 THOUSAND/ÂΜL (ref 0–0.61)
EOSINOPHIL NFR BLD AUTO: 2 % (ref 0–6)
ERYTHROCYTE [DISTWIDTH] IN BLOOD BY AUTOMATED COUNT: 13 % (ref 11.6–15.1)
GFR SERPL CREATININE-BSD FRML MDRD: 118 ML/MIN/1.73SQ M
GLUCOSE SERPL-MCNC: 97 MG/DL (ref 65–140)
HCT VFR BLD AUTO: 37.3 % (ref 34.8–46.1)
HGB BLD-MCNC: 12.1 G/DL (ref 11.5–15.4)
IMM GRANULOCYTES # BLD AUTO: 0.04 THOUSAND/UL (ref 0–0.2)
IMM GRANULOCYTES NFR BLD AUTO: 0 % (ref 0–2)
LYMPHOCYTES # BLD AUTO: 2.79 THOUSANDS/ÂΜL (ref 0.6–4.47)
LYMPHOCYTES NFR BLD AUTO: 31 % (ref 14–44)
MCH RBC QN AUTO: 27.4 PG (ref 26.8–34.3)
MCHC RBC AUTO-ENTMCNC: 32.4 G/DL (ref 31.4–37.4)
MCV RBC AUTO: 85 FL (ref 82–98)
MONOCYTES # BLD AUTO: 0.55 THOUSAND/ÂΜL (ref 0.17–1.22)
MONOCYTES NFR BLD AUTO: 6 % (ref 4–12)
NEUTROPHILS # BLD AUTO: 5.42 THOUSANDS/ÂΜL (ref 1.85–7.62)
NEUTS SEG NFR BLD AUTO: 60 % (ref 43–75)
NRBC BLD AUTO-RTO: 0 /100 WBCS
P AXIS: 50 DEGREES
PLATELET # BLD AUTO: 278 THOUSANDS/UL (ref 149–390)
PMV BLD AUTO: 9.8 FL (ref 8.9–12.7)
POTASSIUM SERPL-SCNC: 3.7 MMOL/L (ref 3.5–5.3)
PR INTERVAL: 136 MS
QRS AXIS: 74 DEGREES
QRSD INTERVAL: 76 MS
QT INTERVAL: 332 MS
QTC INTERVAL: 403 MS
RBC # BLD AUTO: 4.41 MILLION/UL (ref 3.81–5.12)
SODIUM SERPL-SCNC: 137 MMOL/L (ref 135–147)
T WAVE AXIS: 34 DEGREES
VENTRICULAR RATE: 89 BPM
WBC # BLD AUTO: 9.07 THOUSAND/UL (ref 4.31–10.16)

## 2023-07-09 PROCEDURE — 80048 BASIC METABOLIC PNL TOTAL CA: CPT | Performed by: EMERGENCY MEDICINE

## 2023-07-09 PROCEDURE — 85025 COMPLETE CBC W/AUTO DIFF WBC: CPT | Performed by: EMERGENCY MEDICINE

## 2023-07-09 PROCEDURE — 85379 FIBRIN DEGRADATION QUANT: CPT | Performed by: EMERGENCY MEDICINE

## 2023-07-09 PROCEDURE — 84484 ASSAY OF TROPONIN QUANT: CPT | Performed by: EMERGENCY MEDICINE

## 2023-07-09 PROCEDURE — 71046 X-RAY EXAM CHEST 2 VIEWS: CPT

## 2023-07-09 PROCEDURE — 36415 COLL VENOUS BLD VENIPUNCTURE: CPT | Performed by: EMERGENCY MEDICINE

## 2023-07-09 PROCEDURE — 93005 ELECTROCARDIOGRAM TRACING: CPT

## 2023-07-09 PROCEDURE — 99285 EMERGENCY DEPT VISIT HI MDM: CPT

## 2023-07-09 PROCEDURE — 93010 ELECTROCARDIOGRAM REPORT: CPT

## 2023-07-09 PROCEDURE — 83880 ASSAY OF NATRIURETIC PEPTIDE: CPT | Performed by: EMERGENCY MEDICINE

## 2023-07-09 PROCEDURE — 96374 THER/PROPH/DIAG INJ IV PUSH: CPT

## 2023-07-09 RX ORDER — KETOROLAC TROMETHAMINE 30 MG/ML
15 INJECTION, SOLUTION INTRAMUSCULAR; INTRAVENOUS ONCE
Status: COMPLETED | OUTPATIENT
Start: 2023-07-09 | End: 2023-07-09

## 2023-07-09 RX ORDER — PREDNISONE 20 MG/1
40 TABLET ORAL DAILY
Qty: 10 TABLET | Refills: 0 | Status: SHIPPED | OUTPATIENT
Start: 2023-07-09 | End: 2023-07-14

## 2023-07-09 RX ORDER — ALBUTEROL SULFATE 90 UG/1
1-2 AEROSOL, METERED RESPIRATORY (INHALATION) EVERY 6 HOURS PRN
Qty: 6.7 G | Refills: 0 | Status: SHIPPED | OUTPATIENT
Start: 2023-07-09

## 2023-07-09 RX ADMIN — KETOROLAC TROMETHAMINE 15 MG: 30 INJECTION, SOLUTION INTRAMUSCULAR; INTRAVENOUS at 13:54

## 2023-07-09 NOTE — ED PROVIDER NOTES
History  Chief Complaint   Patient presents with   • Chest Pain     Pt c/o intermittent chest pain with sob for a week      A 49-year-old female with past medical history of asthma; presents with shortness of breath and chest pain. Patient states she has had progressively worsening shortness of breath over the past few weeks, usually occurring with exertion and improved with her albuterol inhaler. Patient states over the past week she has also had left-sided chest pain, which has been intermittent in nature. Pain is described as a sharp and stabbing. Patient does report history of costochondritis, stating her chest pain feels similar however she does not usually have associated shortness of breath. She has noticed intermittent lower extremity swelling, also increased with exertion. Patient otherwise denies fever, chills, abdominal pain, nausea, vomiting, diarrhea and rashes. A/P: Dyspnea, now with chest pain. Chest pain is reproducible along costal margin, suggesting MSK etiology. Lungs are CTAB. Will check labs for electrolyte abnormality, anemia, TO and cardiac etiology. Will obtain dimer to rule out PE, image accordingly. Will treat symptomatically. History provided by:  Patient and medical records      Prior to Admission Medications   Prescriptions Last Dose Informant Patient Reported? Taking?    ALPRAZolam (XANAX) 0.5 mg tablet Not Taking  No No   Sig: Take 1 tablet (0.5 mg total) by mouth daily as needed for anxiety   Patient not taking: Reported on 7/9/2023   SUMAtriptan (Imitrex) 50 mg tablet Not Taking  No No   Sig: Take 1 tablet (50 mg total) by mouth once as needed for migraine for up to 1 dose   Patient not taking: Reported on 7/9/2023   Wellbutrin  MG 24 hr tablet   No Yes   Sig: TAKE 1 TABLET (150 MG TOTAL) BY MOUTH DAILY   ibuprofen (MOTRIN) 600 mg tablet   No No   Sig: Take 1 tablet (600 mg total) by mouth every 6 (six) hours as needed for mild pain for up to 10 days lidocaine (LMX) 4 % cream Not Taking Self No No   Sig: Apply topically as needed for moderate pain   Patient not taking: Reported on 2023   naproxen (NAPROSYN) 500 mg tablet 2023  No Yes   Sig: TAKE 1 TABLET BY MOUTH 2 TIMES A DAY AS NEEDED FOR MILD PAIN. ondansetron (Zofran ODT) 4 mg disintegrating tablet Not Taking Self No No   Sig: Take 1 tablet (4 mg total) by mouth every 6 (six) hours as needed for nausea or vomiting   Patient not taking: Reported on 2023      Facility-Administered Medications: None       Past Medical History:   Diagnosis Date   • Fatty liver    • Migraine        Past Surgical History:   Procedure Laterality Date   •  SECTION     • LYMPHADENECTOMY         History reviewed. No pertinent family history. I have reviewed and agree with the history as documented. E-Cigarette/Vaping   • E-Cigarette Use Never User      E-Cigarette/Vaping Substances   • Nicotine No    • THC No    • CBD No    • Flavoring No    • Other No    • Unknown No      Social History     Tobacco Use   • Smoking status: Never   • Smokeless tobacco: Never   Vaping Use   • Vaping Use: Never used   Substance Use Topics   • Alcohol use: Not Currently   • Drug use: Never       Review of Systems   Respiratory: Positive for chest tightness and shortness of breath. Cardiovascular: Positive for chest pain. All other systems reviewed and are negative. Physical Exam  Physical Exam  General Appearance: alert and oriented, nad, non toxic appearing  Skin:  Warm, dry, intact. No cyanosis  HEENT: Atraumatic, normocephalic. No eye drainage. Normal hearing. Moist mucous membranes. Neck: Supple, trachea midline  Cardiac: RRR; no murmurs, rub, gallops. No pedal edema, 2+ pulses  Pulmonary: lungs CTAB; no wheezes, rales, rhonchi. Tenderness along left mid costal margin.   Gastrointestinal: abdomen soft, nontender, nondistended; no guarding or rebound tenderness; good bowel sounds, no mass or bruits  Extremities:  No deformities.   No calf tenderness, no clubbing  Neuro:  no focal motor or sensory deficits, CN 2-12 grossly intact  Psych:  Normal mood and affect, normal judgement and insight      Vital Signs  ED Triage Vitals   Temperature Pulse Respirations Blood Pressure SpO2   07/09/23 1306 07/09/23 1305 07/09/23 1305 07/09/23 1306 07/09/23 1305   (!) 97.4 °F (36.3 °C) 97 18 133/76 98 %      Temp Source Heart Rate Source Patient Position - Orthostatic VS BP Location FiO2 (%)   07/09/23 1306 07/09/23 1429 07/09/23 1429 07/09/23 1429 --   Oral Monitor Sitting Right arm       Pain Score       07/09/23 1354       8           Vitals:    07/09/23 1305 07/09/23 1306 07/09/23 1429   BP:  133/76 128/74   Pulse: 97  66   Patient Position - Orthostatic VS:   Sitting         Visual Acuity      ED Medications  Medications   ketorolac (TORADOL) injection 15 mg (15 mg Intravenous Given 7/9/23 1354)       Diagnostic Studies  Results Reviewed     Procedure Component Value Units Date/Time    D-Dimer [[id number]]  (Normal) Collected: 07/09/23 1335    Lab Status: Final result Specimen: Blood from Arm, Left Updated: 07/09/23 1413     D-Dimer, Quant <0.27 ug/ml FEU     HS Troponin 0hr (reflex protocol) [[id number]]  (Normal) Collected: 07/09/23 1335    Lab Status: Final result Specimen: Blood from Arm, Left Updated: 07/09/23 1405     hs TnI 0hr <2 ng/L     B-Type Natriuretic Peptide(BNP) [[id number]]  (Normal) Collected: 07/09/23 1335    Lab Status: Final result Specimen: Blood from Arm, Left Updated: 07/09/23 1404     BNP 42 pg/mL     Basic metabolic panel [[id number]] Collected: 07/09/23 1335    Lab Status: Final result Specimen: Blood from Arm, Left Updated: 07/09/23 1401     Sodium 137 mmol/L      Potassium 3.7 mmol/L      Chloride 105 mmol/L      CO2 27 mmol/L      ANION GAP 5 mmol/L      BUN 9 mg/dL      Creatinine 0.67 mg/dL      Glucose 97 mg/dL      Calcium 8.6 mg/dL      eGFR 118 ml/min/1.73sq m     Narrative: National Kidney Disease Foundation guidelines for Chronic Kidney Disease (CKD):   •  Stage 1 with normal or high GFR (GFR > 90 mL/min/1.73 square meters)  •  Stage 2 Mild CKD (GFR = 60-89 mL/min/1.73 square meters)  •  Stage 3A Moderate CKD (GFR = 45-59 mL/min/1.73 square meters)  •  Stage 3B Moderate CKD (GFR = 30-44 mL/min/1.73 square meters)  •  Stage 4 Severe CKD (GFR = 15-29 mL/min/1.73 square meters)  •  Stage 5 End Stage CKD (GFR <15 mL/min/1.73 square meters)  Note: GFR calculation is accurate only with a steady state creatinine    CBC and differential [[id number]] Collected: 07/09/23 1335    Lab Status: Final result Specimen: Blood from Arm, Left Updated: 07/09/23 1340     WBC 9.07 Thousand/uL      RBC 4.41 Million/uL      Hemoglobin 12.1 g/dL      Hematocrit 37.3 %      MCV 85 fL      MCH 27.4 pg      MCHC 32.4 g/dL      RDW 13.0 %      MPV 9.8 fL      Platelets 691 Thousands/uL      nRBC 0 /100 WBCs      Neutrophils Relative 60 %      Immat GRANS % 0 %      Lymphocytes Relative 31 %      Monocytes Relative 6 %      Eosinophils Relative 2 %      Basophils Relative 1 %      Neutrophils Absolute 5.42 Thousands/µL      Immature Grans Absolute 0.04 Thousand/uL      Lymphocytes Absolute 2.79 Thousands/µL      Monocytes Absolute 0.55 Thousand/µL      Eosinophils Absolute 0.21 Thousand/µL      Basophils Absolute 0.06 Thousands/µL                  XR chest 2 views   ED Interpretation by Shamir Raman DO (07/09 1442)   No acute disease    Image independently interpreted by myself                   Procedures  Procedures   ECG 12 Lead Documentation  Date/Time: today/date: 7/9/2023  Performed by: Jarvis Pickering    ECG reviewed by me, the ED Provider: yes    Patient location:  ED   Previous ECG:  No old for comparison   Rate:  89  ECG rate assessment: normal    Rhythm: sinus rhythm    Ectopy:  none    QRS axis:  Normal  Intervals: normal   Q waves: None   ST segments:  Normal  T waves: normal      Impression: Normal EKG          ED Course  ED Course as of 07/09/23 1542   Sun Jul 09, 2023   1405 hs TnI 0hr: <2  No further trending needed, as symptoms have been ongoing over the past week   1415 D-Dimer, Quant: <0.27  Pulmonary embolism can be ruled out. Will obtain CXR   1442 Pt resting comfortably at this time, updated on results. Suspect pain is due to pt's costochondritis. Dyspnea likely due to asthma exacerbation from poor air quality over past several weeks. Offered course of prednisone, which pt is in agreement with. HEART Risk Score    Flowsheet Row Most Recent Value   Heart Score Risk Calculator    History 0 Filed at: 07/09/2023 1406   ECG 0 Filed at: 07/09/2023 1406   Age 0 Filed at: 07/09/2023 1406   Risk Factors 0 Filed at: 07/09/2023 1406   Troponin 0 Filed at: 07/09/2023 1406   HEART Score 0 Filed at: 07/09/2023 1406                                      Medical Decision Making  Amount and/or Complexity of Data Reviewed  Labs: ordered. Decision-making details documented in ED Course. Radiology: ordered and independent interpretation performed. Risk  Prescription drug management. Disposition  Final diagnoses:   Atypical chest pain   Costochondritis   Asthma exacerbation     Time reflects when diagnosis was documented in both MDM as applicable and the Disposition within this note     Time User Action Codes Description Comment    7/9/2023  2:48 PM Kim Holguin Add [R07.89] Atypical chest pain     7/9/2023  2:48 PM Kim Holguin Add [M94.0] Costochondritis     7/9/2023  2:48 PM Tasha, Abhi [facility] Asthma exacerbation       ED Disposition     ED Disposition   Discharge    Condition   Stable    Date/Time   Sun Jul 9, 2023  2:48 PM    Comment   Rolando Pineda discharge to home/self care.                Follow-up Information     Follow up With Specialties Details Why [id number] [facility], [facility] Nurse Practitioner Schedule an appointment as soon as possible for a visit in 2 days For re-evaluation [id number] [facility] [id number] [phone]            Discharge Medication List as of 7/9/2023  2:49 PM      START taking these medications    Details   albuterol (Ventolin HFA) 90 mcg/act inhaler Inhale 1-2 puffs every 6 (six) hours as needed for wheezing, Starting Sun 7/9/2023, Normal      predniSONE 20 mg tablet Take 2 tablets (40 mg total) by mouth daily for 5 days, Starting Sun 7/9/2023, Until Fri 7/14/2023, Normal         CONTINUE these medications which have NOT CHANGED    Details   naproxen (NAPROSYN) 500 mg tablet TAKE 1 TABLET BY MOUTH 2 TIMES A DAY AS NEEDED FOR MILD PAIN., Normal      Wellbutrin  MG 24 hr tablet TAKE 1 TABLET (150 MG TOTAL) BY MOUTH DAILY, Normal      ALPRAZolam (XANAX) 0.5 mg tablet Take 1 tablet (0.5 mg total) by mouth daily as needed for anxiety, Starting Wed 6/7/2023, Normal      ibuprofen (MOTRIN) 600 mg tablet Take 1 tablet (600 mg total) by mouth every 6 (six) hours as needed for mild pain for up to 10 days, Starting Wed 3/22/2023, Until Sat 4/1/2023 at 2359, Normal      lidocaine (LMX) 4 % cream Apply topically as needed for moderate pain, Starting u 4/28/2022, Print      ondansetron (Zofran ODT) 4 mg disintegrating tablet Take 1 tablet (4 mg total) by mouth every 6 (six) hours as needed for nausea or vomiting, Starting Wed 5/11/2022, Normal      SUMAtriptan (Imitrex) 50 mg tablet Take 1 tablet (50 mg total) by mouth once as needed for migraine for up to 1 dose, Starting Wed 5/3/2023, Normal             No discharge procedures on file.     PDMP Review       Value Time User    PDMP Reviewed  Yes 6/7/2023  4:31 PM Robby Ghosh, [address] [place] [address]          ED Provider  Electronically Signed by           Ellis Hooper DO  07/09/23 4667

## 2023-07-21 ENCOUNTER — TELEPHONE (OUTPATIENT)
Dept: OBGYN CLINIC | Facility: CLINIC | Age: 28
End: 2023-07-21

## 2023-08-03 ENCOUNTER — HOSPITAL ENCOUNTER (EMERGENCY)
Facility: HOSPITAL | Age: 28
Discharge: HOME | End: 2023-08-03
Attending: EMERGENCY MEDICINE
Payer: COMMERCIAL

## 2023-08-03 VITALS
SYSTOLIC BLOOD PRESSURE: 142 MMHG | BODY MASS INDEX: 32.42 KG/M2 | RESPIRATION RATE: 18 BRPM | OXYGEN SATURATION: 99 % | WEIGHT: 160.5 LBS | TEMPERATURE: 98.3 F | DIASTOLIC BLOOD PRESSURE: 98 MMHG | HEART RATE: 66 BPM

## 2023-08-03 DIAGNOSIS — H60.90 OTITIS EXTERNA: Primary | ICD-10-CM

## 2023-08-03 DIAGNOSIS — H66.90 OTITIS MEDIA: ICD-10-CM

## 2023-08-03 PROCEDURE — 99284 EMERGENCY DEPT VISIT MOD MDM: CPT | Performed by: EMERGENCY MEDICINE

## 2023-08-03 PROCEDURE — 99283 EMERGENCY DEPT VISIT LOW MDM: CPT

## 2023-08-03 PROCEDURE — 96372 THER/PROPH/DIAG INJ SC/IM: CPT

## 2023-08-03 RX ORDER — IBUPROFEN 600 MG/1
600 TABLET ORAL EVERY 6 HOURS PRN
Qty: 20 TABLET | Refills: 0 | Status: SHIPPED | OUTPATIENT
Start: 2023-08-03

## 2023-08-03 RX ORDER — NEOMYCIN SULFATE, POLYMYXIN B SULFATE AND HYDROCORTISONE 10; 3.5; 1 MG/ML; MG/ML; [USP'U]/ML
4 SUSPENSION/ DROPS AURICULAR (OTIC) 3 TIMES DAILY
Qty: 10 ML | Refills: 0 | Status: SHIPPED | OUTPATIENT
Start: 2023-08-03 | End: 2023-08-13

## 2023-08-03 RX ORDER — KETOROLAC TROMETHAMINE 30 MG/ML
30 INJECTION, SOLUTION INTRAMUSCULAR; INTRAVENOUS ONCE
Status: COMPLETED | OUTPATIENT
Start: 2023-08-03 | End: 2023-08-03

## 2023-08-03 RX ORDER — AMOXICILLIN 500 MG/1
500 CAPSULE ORAL 3 TIMES DAILY
Qty: 30 CAPSULE | Refills: 0 | Status: SHIPPED | OUTPATIENT
Start: 2023-08-03 | End: 2023-08-13

## 2023-08-03 RX ADMIN — KETOROLAC TROMETHAMINE 30 MG: 30 INJECTION, SOLUTION INTRAMUSCULAR; INTRAVENOUS at 17:46

## 2023-08-03 NOTE — ED PROVIDER NOTES
History  Chief Complaint   Patient presents with   • Earache     States L ear pain since this afternoon, states can't tolerate the pain. 26 y/o female with L sided ear pain today. No recent swimming, no ear drainage. No fevers, no n/v, no trauma          Prior to Admission Medications   Prescriptions Last Dose Informant Patient Reported? Taking? ALPRAZolam (XANAX) 0.5 mg tablet Not Taking  No No   Sig: Take 1 tablet (0.5 mg total) by mouth daily as needed for anxiety   Patient not taking: Reported on 2023   SUMAtriptan (Imitrex) 50 mg tablet Not Taking  No No   Sig: Take 1 tablet (50 mg total) by mouth once as needed for migraine for up to 1 dose   Patient not taking: Reported on 2023   Wellbutrin  MG 24 hr tablet   No Yes   Sig: TAKE 1 TABLET (150 MG TOTAL) BY MOUTH DAILY   albuterol (Ventolin HFA) 90 mcg/act inhaler   No Yes   Sig: Inhale 1-2 puffs every 6 (six) hours as needed for wheezing   ibuprofen (MOTRIN) 600 mg tablet   No No   Sig: Take 1 tablet (600 mg total) by mouth every 6 (six) hours as needed for mild pain for up to 10 days   lidocaine (LMX) 4 % cream Not Taking Self No No   Sig: Apply topically as needed for moderate pain   Patient not taking: Reported on 2023   naproxen (NAPROSYN) 500 mg tablet Not Taking  No No   Sig: TAKE 1 TABLET BY MOUTH 2 TIMES A DAY AS NEEDED FOR MILD PAIN. Patient not taking: Reported on 8/3/2023   ondansetron (Zofran ODT) 4 mg disintegrating tablet Not Taking Self No No   Sig: Take 1 tablet (4 mg total) by mouth every 6 (six) hours as needed for nausea or vomiting   Patient not taking: Reported on 2023      Facility-Administered Medications: None       Past Medical History:   Diagnosis Date   • Fatty liver    • Migraine        Past Surgical History:   Procedure Laterality Date   •  SECTION     • LYMPHADENECTOMY         History reviewed. No pertinent family history.   I have reviewed and agree with the history as documented. E-Cigarette/Vaping   • E-Cigarette Use Never User      E-Cigarette/Vaping Substances   • Nicotine No    • THC No    • CBD No    • Flavoring No    • Other No    • Unknown No      Social History     Tobacco Use   • Smoking status: Never   • Smokeless tobacco: Never   Vaping Use   • Vaping Use: Never used   Substance Use Topics   • Alcohol use: Not Currently   • Drug use: Never       Review of Systems   Constitutional: Negative for appetite change, fatigue and fever. HENT: Positive for ear pain. Negative for ear discharge, rhinorrhea and sore throat. Eyes: Negative for pain. Respiratory: Negative for cough, shortness of breath and wheezing. Cardiovascular: Negative for chest pain and leg swelling. Gastrointestinal: Negative for abdominal pain, diarrhea and vomiting. Genitourinary: Negative for dysuria and flank pain. Musculoskeletal: Negative for back pain and neck pain. Skin: Negative for rash. Neurological: Negative for syncope and headaches. Psychiatric/Behavioral:        Mood normal       Physical Exam  Physical Exam  Vitals and nursing note reviewed. Constitutional:       Appearance: She is well-developed. HENT:      Head: Normocephalic and atraumatic. Right Ear: Tympanic membrane and external ear normal. There is no impacted cerumen. Left Ear: There is no impacted cerumen. Ears:      Comments: L TM mostly clear, red around edge with some fluid behind TM    L ear Canal is narrowed, reddened, swollen and tender     Nose: No congestion. Mouth/Throat:      Mouth: Mucous membranes are moist.      Pharynx: No oropharyngeal exudate or posterior oropharyngeal erythema. Eyes:      General: No scleral icterus. Extraocular Movements: Extraocular movements intact. Cardiovascular:      Rate and Rhythm: Normal rate and regular rhythm. Pulmonary:      Effort: Pulmonary effort is normal. No respiratory distress. Breath sounds: Normal breath sounds. Abdominal:      Palpations: Abdomen is soft. Tenderness: There is no abdominal tenderness. Musculoskeletal:         General: No deformity or signs of injury. Normal range of motion. Cervical back: Normal range of motion and neck supple. Skin:     General: Skin is warm and dry. Coloration: Skin is not jaundiced or pale. Neurological:      General: No focal deficit present. Mental Status: She is alert and oriented to person, place, and time. Psychiatric:         Mood and Affect: Mood normal.         Behavior: Behavior normal.         Vital Signs  ED Triage Vitals [08/03/23 1703]   Temperature Pulse Respirations Blood Pressure SpO2   98.3 °F (36.8 °C) 66 18 142/98 99 %      Temp Source Heart Rate Source Patient Position - Orthostatic VS BP Location FiO2 (%)   Oral Monitor Sitting Right arm --      Pain Score       10 - Worst Possible Pain           Vitals:    08/03/23 1703   BP: 142/98   Pulse: 66   Patient Position - Orthostatic VS: Sitting         Visual Acuity      ED Medications  Medications   ketorolac (TORADOL) injection 30 mg (has no administration in time range)       Diagnostic Studies  Results Reviewed     None                 No orders to display              Procedures  Procedures         ED Course                               SBIRT 22yo+    Flowsheet Row Most Recent Value   Initial Alcohol Screen: US AUDIT-C     1. How often do you have a drink containing alcohol? 0 Filed at: 08/03/2023 1719   2. How many drinks containing alcohol do you have on a typical day you are drinking? 0 Filed at: 08/03/2023 1719   3b. FEMALE Any Age, or MALE 65+: How often do you have 4 or more drinks on one occassion? 0 Filed at: 08/03/2023 1719   Audit-C Score 0 Filed at: 08/03/2023 1719   ERNIE: How many times in the past year have you. .. Used an illegal drug or used a prescription medication for non-medical reasons?  Never Filed at: 08/03/2023 1719                    [facility]    Disposition  Final diagnoses:   Otitis externa   Otitis media     Time reflects when diagnosis was documented in both MDM as applicable and the Disposition within this note     Time User Action Codes Description Comment    8/3/2023  5:40 PM Deepak Kelsey R Add [H60.90] Otitis externa     8/3/2023  5:40 PM Deepak Kelsey Add [H66.90] Otitis media       ED Disposition     ED Disposition   Discharge    Condition   Stable    Date/Time   Thu Aug 3, 2023  5:40 PM    Comment   Kelvin Beryl discharge to home/self care. Follow-up Information     Follow up With Specialties Details Why Contact Info    Valdo Ga [address] [place] [address] Nurse Practitioner   [address] [facility]  [address] [id number] [phone]            Patient's Medications   Discharge Prescriptions    AMOXICILLIN (AMOXIL) 500 MG CAPSULE    Take 1 capsule (500 mg total) by mouth 3 (three) times a day for 10 days       Start Date: 8/3/2023  End Date: 8/13/2023       Order Dose: 500 mg       Quantity: 30 capsule    Refills: 0    IBUPROFEN (MOTRIN) 600 MG TABLET    Take 1 tablet (600 mg total) by mouth every 6 (six) hours as needed for moderate pain       Start Date: 8/3/2023  End Date: --       Order Dose: 600 mg       Quantity: 20 tablet    Refills: 0    NEOMYCIN-POLYMYXIN-HYDROCORTISONE (CORTISPORIN) 0.35%-10,000 UNITS/ML-1% OTIC SUSPENSION    Administer 4 drops into the left ear 3 (three) times a day for 10 days       Start Date: 8/3/2023  End Date: 8/13/2023       Order Dose: 4 drops       Quantity: 10 mL    Refills: 0       No discharge procedures on file.     PDMP Review       Value Time User    PDMP Reviewed  Yes 6/7/2023  4:31 PM Valdo Ga [address] [place] [address]          ED Provider  Electronically Signed by           Hazel Ramos MD  08/03/23 [phone]

## 2023-08-06 DIAGNOSIS — M94.0 COSTOCHONDRITIS: ICD-10-CM

## 2023-08-07 RX ORDER — NAPROXEN 500 MG/1
TABLET ORAL
Qty: 60 TABLET | Refills: 0 | Status: SHIPPED | OUTPATIENT
Start: 2023-08-07

## 2023-08-30 ENCOUNTER — OFFICE VISIT (OUTPATIENT)
Dept: FAMILY MEDICINE CLINIC | Facility: CLINIC | Age: 28
End: 2023-08-30
Payer: COMMERCIAL

## 2023-08-30 VITALS
DIASTOLIC BLOOD PRESSURE: 78 MMHG | HEIGHT: 59 IN | OXYGEN SATURATION: 97 % | WEIGHT: 159 LBS | SYSTOLIC BLOOD PRESSURE: 114 MMHG | HEART RATE: 68 BPM | BODY MASS INDEX: 32.05 KG/M2 | TEMPERATURE: 97.6 F | RESPIRATION RATE: 16 BRPM

## 2023-08-30 DIAGNOSIS — G43.809 OTHER MIGRAINE WITHOUT STATUS MIGRAINOSUS, NOT INTRACTABLE: Primary | ICD-10-CM

## 2023-08-30 DIAGNOSIS — F41.9 ANXIETY: ICD-10-CM

## 2023-08-30 DIAGNOSIS — R53.83 OTHER FATIGUE: ICD-10-CM

## 2023-08-30 DIAGNOSIS — F32.A DEPRESSION, UNSPECIFIED DEPRESSION TYPE: ICD-10-CM

## 2023-08-30 DIAGNOSIS — R29.818 SUSPECTED SLEEP APNEA: ICD-10-CM

## 2023-08-30 PROCEDURE — 99214 OFFICE O/P EST MOD 30 MIN: CPT | Performed by: NURSE PRACTITIONER

## 2023-08-30 RX ORDER — SUMATRIPTAN 50 MG/1
50 TABLET, FILM COATED ORAL ONCE AS NEEDED
Qty: 9 TABLET | Refills: 0 | Status: SHIPPED | OUTPATIENT
Start: 2023-08-30

## 2023-08-30 NOTE — PROGRESS NOTES
Name: Isabel Zamora      : 1995      MRN: [id number]  Encounter Provider: BING Early  Encounter Date: 2023   Encounter department: [facility]     1. Other migraine without status migrainosus, not intractable  Assessment & Plan:  - Stable with use of Imitrex as needed. Continue same.   - Will continue to monitor. Orders:  -     SUMAtriptan (Imitrex) 50 mg tablet; Take 1 tablet (50 mg total) by mouth once as needed for migraine for up to 1 dose    2. Other fatigue  Assessment & Plan:  - Will obtain labs and continue to follow up. Orders:  -     Iron Panel (Includes Ferritin, Iron Sat%, Iron, and TIBC); Future  -     Vitamin D 25 hydroxy; Future    3. Suspected sleep apnea  Assessment & Plan:  - Referred to Sleep Medicine for further evaluation and management. Orders:  -     Ambulatory Referral to Sleep Medicine; Future    4. Depression, unspecified depression type  Assessment & Plan:  - Improving.   - Continue Wellbutrin 150 mg daily. - Will continue to monitor. 5. Anxiety  Assessment & Plan:  - Improving.   - Continue Wellbutrin 150 mg daily and xanax as needed. - Will continue to monitor. Subjective     Patient with PMH of anxiety, depression, and migraines presents today for follow up. She is taking her prescribed medication and reports no side effects. She has complaints today of fatigue and lack of energy. She did not get her blood work done yet. Also complains of snoring and gasping for breath in her sleep. Boyfriend has said she stops breathing during her sleep. Denies any known history of sleep apnea. Also having some pelvic pain which is worse with intercourse. She is trying to schedule an appointment with Gynecology. She denies any other concerns or complaints today. Review of Systems   Constitutional: Positive for fatigue. Negative for fever. HENT: Negative for trouble swallowing.     Eyes: Negative for visual disturbance. Respiratory: Negative for cough and shortness of breath. Cardiovascular: Negative for chest pain and palpitations. Gastrointestinal: Negative for abdominal pain and blood in stool. Endocrine: Negative for cold intolerance and heat intolerance. Genitourinary: Positive for pelvic pain and vaginal discharge. Negative for difficulty urinating and dysuria. Musculoskeletal: Negative for gait problem. Skin: Negative for rash. Neurological: Negative for dizziness, syncope and headaches. Hematological: Negative for adenopathy. Psychiatric/Behavioral: Negative for behavioral problems. Past Medical History:   Diagnosis Date   • Fatty liver    • Migraine      Past Surgical History:   Procedure Laterality Date   •  SECTION     • LYMPHADENECTOMY       History reviewed. No pertinent family history.   Social History     Socioeconomic History   • Marital status: Single     Spouse name: None   • Number of children: None   • Years of education: None   • Highest education level: None   Occupational History   • None   Tobacco Use   • Smoking status: Never   • Smokeless tobacco: Never   Vaping Use   • Vaping Use: Never used   Substance and Sexual Activity   • Alcohol use: Not Currently   • Drug use: Never   • Sexual activity: Yes     Partners: Male   Other Topics Concern   • None   Social History Narrative   • None     Social Determinants of Health     Financial Resource Strain: Not on file   Food Insecurity: Not on file   Transportation Needs: Not on file   Physical Activity: Not on file   Stress: Not on file   Social Connections: Not on file   Intimate Partner Violence: Not on file   Housing Stability: Not on file     Current Outpatient Medications on File Prior to Visit   Medication Sig   • albuterol (Ventolin HFA) 90 mcg/act inhaler Inhale 1-2 puffs every 6 (six) hours as needed for wheezing   • ALPRAZolam (XANAX) 0.5 mg tablet Take 1 tablet (0.5 mg total) by mouth daily as needed for anxiety   • naproxen (NAPROSYN) 500 mg tablet TAKE 1 TABLET BY MOUTH 2 TIMES A DAY AS NEEDED FOR MILD PAIN.    • Wellbutrin  MG 24 hr tablet TAKE 1 TABLET (150 MG TOTAL) BY MOUTH DAILY   • [DISCONTINUED] SUMAtriptan (Imitrex) 50 mg tablet Take 1 tablet (50 mg total) by mouth once as needed for migraine for up to 1 dose   • [DISCONTINUED] ibuprofen (MOTRIN) 600 mg tablet Take 1 tablet (600 mg total) by mouth every 6 (six) hours as needed for mild pain for up to 10 days   • [DISCONTINUED] ibuprofen (MOTRIN) 600 mg tablet Take 1 tablet (600 mg total) by mouth every 6 (six) hours as needed for moderate pain   • [DISCONTINUED] lidocaine (LMX) 4 % cream Apply topically as needed for moderate pain (Patient not taking: Reported on 2/24/2023)   • [DISCONTINUED] neomycin-polymyxin-hydrocortisone (CORTISPORIN) 0.35%-10,000 units/mL-1% otic suspension Administer 4 drops into the left ear 3 (three) times a day for 10 days   • [DISCONTINUED] ondansetron (Zofran ODT) 4 mg disintegrating tablet Take 1 tablet (4 mg total) by mouth every 6 (six) hours as needed for nausea or vomiting (Patient not taking: Reported on 2/24/2023)     No Known Allergies  Immunization History   Administered Date(s) Administered   • DTaP 1993, 1993, 10/06/1994, 10/19/1994, 03/24/1998   • HPV Quadrivalent 09/16/2008, 11/19/2008, 03/10/2009   • Hep A, ped/adol, 2 dose 06/29/2009, 12/30/2009   • Hep B, Adolescent or Pediatric 1993, 1993, 1993   • Hib (PRP-T) 1993, 1993, 1993, 10/19/1994   • INFLUENZA 10/02/2019, 10/25/2021   • IPV 1993, 1993, 10/19/1994, 03/24/1998   • Influenza Injectable, MDCK, Preservative Free, Quadrivalent, 0.5 mL 12/05/2022   • Influenza, seasonal, injectable 11/10/2014, 10/22/2018, 10/02/2019, 10/25/2021   • MMR 07/20/1994, 05/08/1997   • Meningococcal MCV4P 06/29/2009   • Tdap 03/10/2009, 02/02/2015   • Tuberculin Skin Test-PPD Intradermal 03/10/2009, 07/27/2010, 08/09/2010, 06/25/2012   • Varicella 11/21/2003, 09/16/2008       Objective     /78 (BP Location: Left arm, Patient Position: Sitting, Cuff Size: Large)   Pulse 68   Temp 97.6 °F (36.4 °C) (Tympanic)   Resp 16   Ht 4' 11" (1.499 m)   Wt 72.1 kg (159 lb)   SpO2 97%   BMI 32.11 kg/m²     Physical Exam  Vitals and nursing note reviewed. Constitutional:       General: She is not in acute distress. Appearance: Normal appearance. She is not ill-appearing. HENT:      Head: Normocephalic and atraumatic. Right Ear: External ear normal.      Left Ear: External ear normal.   Eyes:      Conjunctiva/sclera: Conjunctivae normal.   Cardiovascular:      Rate and Rhythm: Normal rate and regular rhythm. Heart sounds: Normal heart sounds. Pulmonary:      Effort: Pulmonary effort is normal.      Breath sounds: Normal breath sounds. Musculoskeletal:         General: Normal range of motion. Cervical back: Normal range of motion. Skin:     General: Skin is warm and dry. Neurological:      Mental Status: She is alert and oriented to person, place, and time.    Psychiatric:         Mood and Affect: Mood normal.         Behavior: Behavior normal.       BING Colon

## 2023-10-04 DIAGNOSIS — G43.809 OTHER MIGRAINE WITHOUT STATUS MIGRAINOSUS, NOT INTRACTABLE: ICD-10-CM

## 2023-10-04 RX ORDER — SUMATRIPTAN 50 MG/1
TABLET, FILM COATED ORAL
Qty: 9 TABLET | Refills: 0 | Status: SHIPPED | OUTPATIENT
Start: 2023-10-04

## 2023-10-25 ENCOUNTER — OFFICE VISIT (OUTPATIENT)
Dept: FAMILY MEDICINE CLINIC | Facility: CLINIC | Age: 28
End: 2023-10-25
Payer: COMMERCIAL

## 2023-10-25 ENCOUNTER — APPOINTMENT (OUTPATIENT)
Dept: LAB | Facility: HOSPITAL | Age: 28
End: 2023-10-25
Payer: COMMERCIAL

## 2023-10-25 VITALS
WEIGHT: 157.4 LBS | BODY MASS INDEX: 31.73 KG/M2 | OXYGEN SATURATION: 98 % | HEART RATE: 88 BPM | RESPIRATION RATE: 16 BRPM | TEMPERATURE: 98.7 F | HEIGHT: 59 IN | SYSTOLIC BLOOD PRESSURE: 118 MMHG | DIASTOLIC BLOOD PRESSURE: 80 MMHG

## 2023-10-25 DIAGNOSIS — K52.9 CHRONIC DIARRHEA: ICD-10-CM

## 2023-10-25 DIAGNOSIS — F32.A DEPRESSION, UNSPECIFIED DEPRESSION TYPE: ICD-10-CM

## 2023-10-25 DIAGNOSIS — K76.0 FATTY LIVER: ICD-10-CM

## 2023-10-25 DIAGNOSIS — R53.83 OTHER FATIGUE: ICD-10-CM

## 2023-10-25 DIAGNOSIS — R16.0 ENLARGED LIVER: ICD-10-CM

## 2023-10-25 DIAGNOSIS — G43.809 OTHER MIGRAINE WITHOUT STATUS MIGRAINOSUS, NOT INTRACTABLE: ICD-10-CM

## 2023-10-25 DIAGNOSIS — K21.9 GASTROESOPHAGEAL REFLUX DISEASE, UNSPECIFIED WHETHER ESOPHAGITIS PRESENT: ICD-10-CM

## 2023-10-25 DIAGNOSIS — Z00.00 HEALTHCARE MAINTENANCE: ICD-10-CM

## 2023-10-25 DIAGNOSIS — F41.9 ANXIETY: ICD-10-CM

## 2023-10-25 DIAGNOSIS — M54.31 SCIATIC PAIN, RIGHT: Primary | ICD-10-CM

## 2023-10-25 LAB
25(OH)D3 SERPL-MCNC: 18.9 NG/ML (ref 30–100)
ALBUMIN SERPL BCP-MCNC: 4.2 G/DL (ref 3.5–5)
ALP SERPL-CCNC: 86 U/L (ref 34–104)
ALT SERPL W P-5'-P-CCNC: 42 U/L (ref 7–52)
ANION GAP SERPL CALCULATED.3IONS-SCNC: 8 MMOL/L
AST SERPL W P-5'-P-CCNC: 26 U/L (ref 13–39)
BASOPHILS # BLD AUTO: 0.1 THOUSANDS/ÂΜL (ref 0–0.1)
BASOPHILS NFR BLD AUTO: 1 % (ref 0–1)
BILIRUB SERPL-MCNC: 0.41 MG/DL (ref 0.2–1)
BUN SERPL-MCNC: 7 MG/DL (ref 5–25)
CALCIUM SERPL-MCNC: 9.4 MG/DL (ref 8.4–10.2)
CHLORIDE SERPL-SCNC: 103 MMOL/L (ref 96–108)
CHOLEST SERPL-MCNC: 148 MG/DL
CO2 SERPL-SCNC: 26 MMOL/L (ref 21–32)
CREAT SERPL-MCNC: 0.62 MG/DL (ref 0.6–1.3)
EOSINOPHIL # BLD AUTO: 0.2 THOUSAND/ÂΜL (ref 0–0.61)
EOSINOPHIL NFR BLD AUTO: 2 % (ref 0–6)
ERYTHROCYTE [DISTWIDTH] IN BLOOD BY AUTOMATED COUNT: 13 % (ref 11.6–15.1)
FERRITIN SERPL-MCNC: 36 NG/ML (ref 11–307)
GFR SERPL CREATININE-BSD FRML MDRD: 121 ML/MIN/1.73SQ M
GLUCOSE P FAST SERPL-MCNC: 83 MG/DL (ref 65–99)
HCT VFR BLD AUTO: 38.7 % (ref 34.8–46.1)
HCV AB SER QL: NORMAL
HDLC SERPL-MCNC: 45 MG/DL
HGB BLD-MCNC: 12.4 G/DL (ref 11.5–15.4)
IMM GRANULOCYTES # BLD AUTO: 0.05 THOUSAND/UL (ref 0–0.2)
IMM GRANULOCYTES NFR BLD AUTO: 0 % (ref 0–2)
IRON SATN MFR SERPL: 13 % (ref 15–50)
IRON SERPL-MCNC: 44 UG/DL (ref 50–212)
LDLC SERPL CALC-MCNC: 76 MG/DL (ref 0–100)
LYMPHOCYTES # BLD AUTO: 3.93 THOUSANDS/ÂΜL (ref 0.6–4.47)
LYMPHOCYTES NFR BLD AUTO: 35 % (ref 14–44)
MCH RBC QN AUTO: 27.6 PG (ref 26.8–34.3)
MCHC RBC AUTO-ENTMCNC: 32 G/DL (ref 31.4–37.4)
MCV RBC AUTO: 86 FL (ref 82–98)
MONOCYTES # BLD AUTO: 0.62 THOUSAND/ÂΜL (ref 0.17–1.22)
MONOCYTES NFR BLD AUTO: 6 % (ref 4–12)
NEUTROPHILS # BLD AUTO: 6.29 THOUSANDS/ÂΜL (ref 1.85–7.62)
NEUTS SEG NFR BLD AUTO: 56 % (ref 43–75)
NRBC BLD AUTO-RTO: 0 /100 WBCS
PLATELET # BLD AUTO: 360 THOUSANDS/UL (ref 149–390)
PMV BLD AUTO: 10.6 FL (ref 8.9–12.7)
POTASSIUM SERPL-SCNC: 3.5 MMOL/L (ref 3.5–5.3)
PROT SERPL-MCNC: 7.9 G/DL (ref 6.4–8.4)
RBC # BLD AUTO: 4.49 MILLION/UL (ref 3.81–5.12)
SODIUM SERPL-SCNC: 137 MMOL/L (ref 135–147)
TIBC SERPL-MCNC: 344 UG/DL (ref 250–450)
TRIGL SERPL-MCNC: 137 MG/DL
TSH SERPL DL<=0.05 MIU/L-ACNC: 1.98 UIU/ML (ref 0.45–4.5)
UIBC SERPL-MCNC: 300 UG/DL (ref 155–355)
WBC # BLD AUTO: 11.19 THOUSAND/UL (ref 4.31–10.16)

## 2023-10-25 PROCEDURE — 80061 LIPID PANEL: CPT

## 2023-10-25 PROCEDURE — 82306 VITAMIN D 25 HYDROXY: CPT

## 2023-10-25 PROCEDURE — 96372 THER/PROPH/DIAG INJ SC/IM: CPT

## 2023-10-25 PROCEDURE — 80053 COMPREHEN METABOLIC PANEL: CPT

## 2023-10-25 PROCEDURE — 85025 COMPLETE CBC W/AUTO DIFF WBC: CPT

## 2023-10-25 PROCEDURE — 99214 OFFICE O/P EST MOD 30 MIN: CPT | Performed by: NURSE PRACTITIONER

## 2023-10-25 PROCEDURE — 83550 IRON BINDING TEST: CPT

## 2023-10-25 PROCEDURE — 86803 HEPATITIS C AB TEST: CPT

## 2023-10-25 PROCEDURE — 84443 ASSAY THYROID STIM HORMONE: CPT

## 2023-10-25 PROCEDURE — 82728 ASSAY OF FERRITIN: CPT

## 2023-10-25 PROCEDURE — 36415 COLL VENOUS BLD VENIPUNCTURE: CPT

## 2023-10-25 PROCEDURE — 83540 ASSAY OF IRON: CPT

## 2023-10-25 RX ORDER — METRONIDAZOLE 500 MG/1
TABLET ORAL
COMMUNITY
Start: 2023-09-14

## 2023-10-25 RX ORDER — KETOROLAC TROMETHAMINE 30 MG/ML
60 INJECTION, SOLUTION INTRAMUSCULAR; INTRAVENOUS ONCE
Status: COMPLETED | OUTPATIENT
Start: 2023-10-25 | End: 2023-10-25

## 2023-10-25 RX ORDER — OMEPRAZOLE 40 MG/1
40 CAPSULE, DELAYED RELEASE ORAL DAILY
COMMUNITY
Start: 2023-10-17

## 2023-10-25 RX ORDER — PREDNISONE 50 MG/1
50 TABLET ORAL DAILY
Qty: 5 TABLET | Refills: 0 | Status: SHIPPED | OUTPATIENT
Start: 2023-10-25 | End: 2023-10-30

## 2023-10-25 RX ADMIN — KETOROLAC TROMETHAMINE 60 MG: 30 INJECTION, SOLUTION INTRAMUSCULAR; INTRAVENOUS at 16:51

## 2023-10-25 NOTE — ASSESSMENT & PLAN NOTE
- IM Toradol given in office today for ongoing migraine.   - Continue Imitrex as needed. - Consider Topamax if migraines increase in frequency. - Will continue to monitor.

## 2023-10-25 NOTE — ASSESSMENT & PLAN NOTE
- Advised to avoid fatty foods as this makes diarrhea worse. - Recommend high fiber diet. - Referred to GI for further evaluation.

## 2023-10-25 NOTE — ASSESSMENT & PLAN NOTE
- Prescription sent for prednisone 50 mg daily x 5 days. Advised of side effects.   - Encouraged to do home exercises. - Consider formal PT if no improvement.

## 2023-10-25 NOTE — PROGRESS NOTES
Name: Isabel Zamora      : 1995      MRN: [id number]  Encounter Provider: BING Early  Encounter Date: 10/25/2023   Encounter department: [facility]     1. Sciatic pain, right  Assessment & Plan:  - Prescription sent for prednisone 50 mg daily x 5 days. Advised of side effects.   - Encouraged to do home exercises. - Consider formal PT if no improvement. Orders:  -     predniSONE 50 mg tablet; Take 1 tablet (50 mg total) by mouth daily for 5 days  -     ketorolac (TORADOL) 60 mg/2 mL IM injection 60 mg    2. Gastroesophageal reflux disease, unspecified whether esophagitis present  Assessment & Plan:  - Continue omeprazole 40 mg daily. - Avoid triggering food and beverage.  - Follow up with GI. Orders:  -     Ambulatory Referral to Gastroenterology; Future    3. Chronic diarrhea  Assessment & Plan:  - Advised to avoid fatty foods as this makes diarrhea worse. - Recommend high fiber diet. - Referred to GI for further evaluation. 4. Fatty liver  Assessment & Plan:  - Encourage healthy diet, exercise, and weight loss. - Will continue to monitor. 5. Other migraine without status migrainosus, not intractable  Assessment & Plan:  - IM Toradol given in office today for ongoing migraine.   - Continue Imitrex as needed. - Consider Topamax if migraines increase in frequency. - Will continue to monitor. 6. Depression, unspecified depression type  Assessment & Plan:  - Well controlled on Wellbutrin 150 mg daily. Continue same.   - Will continue to monitor. 7. Anxiety  Assessment & Plan:  - Well controlled on Wellbutrin 150 mg daily. Continue same.   - Will continue to monitor. Subjective     Patient with PMH of migraines, fatty liver, depression, and anxiety presents today for follow up. Has complaints of right sided sciatic pain that has been occurring for a few weeks.  Pain goes down the buttock into her right leg. Endorses numbness and tingling of the right leg. Denies any loss of bowel or bladder function. Pain gets worse throughout the day. She has taken OTC Tylenol with no improvement. Has similar pain in the past and did physical therapy. Also complains of a week-long headache that is not going away. Took her Imitrex with no improvement. Also complains of GERD and chronic diarrhea. Diarrhea occurs more with greasy food. She would like to be evaluated by GI. Denies any other concerns or complaints today. Review of Systems   Constitutional:  Negative for fatigue and fever. HENT:  Negative for trouble swallowing. Eyes:  Negative for visual disturbance. Respiratory:  Negative for cough and shortness of breath. Cardiovascular:  Negative for chest pain and palpitations. Gastrointestinal:  Positive for diarrhea. Negative for abdominal pain and blood in stool. GERD     Endocrine: Negative for cold intolerance and heat intolerance. Genitourinary:  Negative for difficulty urinating and dysuria. Musculoskeletal:  Positive for back pain. Negative for gait problem. Skin:  Negative for rash. Neurological:  Positive for numbness and headaches. Negative for dizziness and syncope. Hematological:  Negative for adenopathy. Psychiatric/Behavioral:  Negative for behavioral problems. Past Medical History:   Diagnosis Date   • Fatty liver    • Migraine      Past Surgical History:   Procedure Laterality Date   •  SECTION     • LYMPHADENECTOMY       History reviewed. No pertinent family history.   Social History     Socioeconomic History   • Marital status: Single     Spouse name: None   • Number of children: None   • Years of education: None   • Highest education level: None   Occupational History   • None   Tobacco Use   • Smoking status: Never   • Smokeless tobacco: Never   Vaping Use   • Vaping Use: Never used   Substance and Sexual Activity   • Alcohol use: Not Currently   • Drug use: Never   • Sexual activity: Yes     Partners: Male   Other Topics Concern   • None   Social History Narrative   • None     Social Determinants of Health     Financial Resource Strain: Not on file   Food Insecurity: Not on file   Transportation Needs: Not on file   Physical Activity: Not on file   Stress: Not on file   Social Connections: Not on file   Intimate Partner Violence: Not on file   Housing Stability: Not on file     Current Outpatient Medications on File Prior to Visit   Medication Sig   • metroNIDAZOLE (FLAGYL) 500 mg tablet TAKE 1 TABLET BY MOUTH TWICE A DAY FOR 7 DAYS   • omeprazole (PriLOSEC) 40 MG capsule Take 40 mg by mouth daily   • SUMAtriptan (IMITREX) 50 mg tablet TAKE 1 TABLET BY MOUTH ONCE AS NEEDED FOR MIGRAINE FOR UP TO 1 DOSE. • Wellbutrin  MG 24 hr tablet TAKE 1 TABLET (150 MG TOTAL) BY MOUTH DAILY   • albuterol (Ventolin HFA) 90 mcg/act inhaler Inhale 1-2 puffs every 6 (six) hours as needed for wheezing (Patient not taking: Reported on 10/25/2023)   • ALPRAZolam (XANAX) 0.5 mg tablet Take 1 tablet (0.5 mg total) by mouth daily as needed for anxiety (Patient not taking: Reported on 10/25/2023)   • naproxen (NAPROSYN) 500 mg tablet TAKE 1 TABLET BY MOUTH 2 TIMES A DAY AS NEEDED FOR MILD PAIN.  (Patient not taking: Reported on 10/25/2023)     No Known Allergies  Immunization History   Administered Date(s) Administered   • DTaP 1993, 1993, 10/06/1994, 10/19/1994, 03/24/1998   • HPV Quadrivalent 09/16/2008, 11/19/2008, 03/10/2009   • Hep A, ped/adol, 2 dose 06/29/2009, 12/30/2009   • Hep B, Adolescent or Pediatric 1993, 1993, 1993   • Hib (PRP-T) 1993, 1993, 1993, 10/19/1994   • INFLUENZA 10/02/2019, 10/25/2021   • IPV 1993, 1993, 10/19/1994, 03/24/1998   • Influenza Injectable, MDCK, Preservative Free, Quadrivalent, 0.5 mL 12/05/2022   • Influenza, seasonal, injectable 11/10/2014, 10/22/2018, 10/02/2019, 10/25/2021   • MMR 07/20/1994, 05/08/1997   • Meningococcal MCV4P 06/29/2009   • Tdap 03/10/2009, 02/02/2015   • Tuberculin Skin Test-PPD Intradermal 03/10/2009, 07/27/2010, 08/09/2010, 06/25/2012   • Varicella 11/21/2003, 09/16/2008       Objective     /80 (BP Location: Left arm, Patient Position: Sitting, Cuff Size: Large)   Pulse 88   Temp 98.7 °F (37.1 °C) (Tympanic)   Resp 16   Ht 4' 11" (1.499 m)   Wt 71.4 kg (157 lb 6.4 oz)   SpO2 98%   BMI 31.79 kg/m²     Physical Exam  Vitals and nursing note reviewed. Constitutional:       Appearance: Normal appearance. HENT:      Head: Normocephalic and atraumatic. Right Ear: External ear normal.      Left Ear: External ear normal.      Nose: Nose normal.   Eyes:      Conjunctiva/sclera: Conjunctivae normal.   Cardiovascular:      Rate and Rhythm: Normal rate and regular rhythm. Heart sounds: Normal heart sounds. Pulmonary:      Effort: Pulmonary effort is normal.      Breath sounds: Normal breath sounds. Abdominal:      General: Bowel sounds are normal.      Palpations: Abdomen is soft. Musculoskeletal:      Cervical back: Normal range of motion. Lumbar back: Tenderness present. Positive right straight leg raise test.   Lymphadenopathy:      Cervical: No cervical adenopathy. Skin:     General: Skin is warm and dry. Neurological:      Mental Status: She is alert and oriented to person, place, and time.    Psychiatric:         Mood and Affect: Mood normal.         Behavior: Behavior normal.       BING Francis

## 2023-10-26 ENCOUNTER — TELEPHONE (OUTPATIENT)
Dept: FAMILY MEDICINE CLINIC | Facility: CLINIC | Age: 28
End: 2023-10-26

## 2023-10-26 DIAGNOSIS — E55.9 VITAMIN D DEFICIENCY: Primary | ICD-10-CM

## 2023-10-26 RX ORDER — ERGOCALCIFEROL 1.25 MG/1
50000 CAPSULE ORAL WEEKLY
Qty: 12 CAPSULE | Refills: 0 | Status: SHIPPED | OUTPATIENT
Start: 2023-10-26 | End: 2024-01-12

## 2023-10-26 NOTE — TELEPHONE ENCOUNTER
We look more at ferritin which is normal. Her hemoglobin is also normal so no anemia. We will continue to monitor.

## 2023-10-26 NOTE — TELEPHONE ENCOUNTER
----- Message from [address] sent at 10/26/2023  7:49 AM EDT -----  Labs show low vitamin D. I sent prescription for Vitamin D 50,000 units weekly for next 12 weeks. She can then take OTC supplement of 1000 units/day. The rest of her labs were stable.

## 2023-10-26 NOTE — TELEPHONE ENCOUNTER
Pt aware of results and reccomenations. She is concerned with her Iron level and is wondering if she needs to do anything. Please advise.

## 2023-11-07 ENCOUNTER — TELEPHONE (OUTPATIENT)
Dept: FAMILY MEDICINE CLINIC | Facility: CLINIC | Age: 28
End: 2023-11-07

## 2023-11-07 DIAGNOSIS — M54.31 SCIATIC PAIN, RIGHT: Primary | ICD-10-CM

## 2023-11-07 RX ORDER — METHYLPREDNISOLONE 4 MG/1
TABLET ORAL
Qty: 21 EACH | Refills: 0 | Status: SHIPPED | OUTPATIENT
Start: 2023-11-07

## 2023-11-07 NOTE — TELEPHONE ENCOUNTER
----- Message from Goevanni Quezada sent at 11/7/2023  1:29 PM EST -----  Regarding: Sciatic Nerve pain   Contact: [phone]  Jonnathan doctor Mamadou im at work right now and im having bad pain going down my leg and into the top of my right feet the pain is at a 9 right now what do you recommend

## 2023-11-07 NOTE — TELEPHONE ENCOUNTER
Informed patient she needs to call the office for an appointment to determine what the problem is.  Sent through My chart

## 2023-11-07 NOTE — TELEPHONE ENCOUNTER
Was not able to schedule a convenient time for the patient to be seen. She wanted me to ask you what else can she do. She said last time she had a toradol injection and prednisone. She has had pain all day and it is very bad.     Asking for a call

## 2023-11-11 ENCOUNTER — HOSPITAL ENCOUNTER (OUTPATIENT)
Dept: ULTRASOUND IMAGING | Facility: HOSPITAL | Age: 28
Discharge: HOME | End: 2023-11-11
Payer: COMMERCIAL

## 2023-11-11 DIAGNOSIS — R10.2 PELVIC AND PERINEAL PAIN: ICD-10-CM

## 2023-11-11 PROCEDURE — 76856 US EXAM PELVIC COMPLETE: CPT

## 2023-11-11 PROCEDURE — 76830 TRANSVAGINAL US NON-OB: CPT

## 2024-01-25 ENCOUNTER — OFFICE VISIT (OUTPATIENT)
Dept: FAMILY MEDICINE CLINIC | Facility: CLINIC | Age: 29
End: 2024-01-25
Payer: COMMERCIAL

## 2024-01-25 VITALS
BODY MASS INDEX: 31.85 KG/M2 | TEMPERATURE: 98.9 F | DIASTOLIC BLOOD PRESSURE: 86 MMHG | HEIGHT: 59 IN | WEIGHT: 158 LBS | SYSTOLIC BLOOD PRESSURE: 112 MMHG | OXYGEN SATURATION: 98 % | HEART RATE: 103 BPM

## 2024-01-25 DIAGNOSIS — E55.9 VITAMIN D DEFICIENCY: ICD-10-CM

## 2024-01-25 DIAGNOSIS — R53.83 OTHER FATIGUE: ICD-10-CM

## 2024-01-25 DIAGNOSIS — J01.00 ACUTE NON-RECURRENT MAXILLARY SINUSITIS: Primary | ICD-10-CM

## 2024-01-25 DIAGNOSIS — D72.829 LEUKOCYTOSIS, UNSPECIFIED TYPE: ICD-10-CM

## 2024-01-25 DIAGNOSIS — E61.1 IRON DEFICIENCY: ICD-10-CM

## 2024-01-25 DIAGNOSIS — E66.09 CLASS 1 OBESITY DUE TO EXCESS CALORIES WITHOUT SERIOUS COMORBIDITY WITH BODY MASS INDEX (BMI) OF 31.0 TO 31.9 IN ADULT: ICD-10-CM

## 2024-01-25 PROBLEM — B37.9 YEAST INFECTION: Status: RESOLVED | Noted: 2023-05-04 | Resolved: 2024-01-25

## 2024-01-25 PROBLEM — M94.0 COSTOCHONDRITIS: Status: RESOLVED | Noted: 2023-06-07 | Resolved: 2024-01-25

## 2024-01-25 PROBLEM — D18.03 LIVER HEMANGIOMA: Status: ACTIVE | Noted: 2017-02-20

## 2024-01-25 PROBLEM — K58.0 IRRITABLE BOWEL SYNDROME WITH DIARRHEA: Status: ACTIVE | Noted: 2018-10-25

## 2024-01-25 PROBLEM — E66.811 CLASS 1 OBESITY DUE TO EXCESS CALORIES WITHOUT SERIOUS COMORBIDITY WITH BODY MASS INDEX (BMI) OF 31.0 TO 31.9 IN ADULT: Status: ACTIVE | Noted: 2024-01-25

## 2024-01-25 PROCEDURE — 99214 OFFICE O/P EST MOD 30 MIN: CPT | Performed by: NURSE PRACTITIONER

## 2024-01-25 RX ORDER — ERGOCALCIFEROL 1.25 MG/1
50000 CAPSULE ORAL WEEKLY
Qty: 12 CAPSULE | Refills: 0 | Status: CANCELLED | OUTPATIENT
Start: 2024-01-25 | End: 2024-04-12

## 2024-01-25 RX ORDER — FERROUS SULFATE 324(65)MG
324 TABLET, DELAYED RELEASE (ENTERIC COATED) ORAL
Qty: 90 TABLET | Refills: 1 | Status: SHIPPED | OUTPATIENT
Start: 2024-01-25

## 2024-01-25 RX ORDER — METHYLPREDNISOLONE 4 MG/1
TABLET ORAL
Qty: 21 EACH | Refills: 0 | Status: SHIPPED | OUTPATIENT
Start: 2024-01-25

## 2024-01-25 RX ORDER — AZITHROMYCIN 250 MG/1
TABLET, FILM COATED ORAL DAILY
Qty: 6 TABLET | Refills: 0 | Status: SHIPPED | OUTPATIENT
Start: 2024-01-25 | End: 2024-01-30

## 2024-01-25 NOTE — PROGRESS NOTES
Name: María Gusman      : 1995      MRN: [id number]  Encounter Provider: BING Hurley  Encounter Date: 2024   Encounter department: [facility]    Assessment & Plan     1. Acute non-recurrent maxillary sinusitis  Assessment & Plan:  Azithromycin and Medrol Dosepak were ordered for treatment of acute sinusitis.    Orders:  -     azithromycin (Zithromax) 250 mg tablet; Take 2 tablets (500 mg total) by mouth daily for 1 day, THEN 1 tablet (250 mg total) daily for 4 days.  -     methylPREDNISolone 4 MG tablet therapy pack; Use as directed on package    2. Vitamin D deficiency  Assessment & Plan:  Repeat vitamin D level was ordered to be completed prior to her next office visit to assess the status of this.    Orders:  -     Vitamin D 25 hydroxy; Future    3. Iron deficiency  Assessment & Plan:  Patient will be started on a daily iron supplement for treatment.  A repeat iron level was ordered to be completed in 1 month.    Orders:  -     ferrous sulfate 324 (65 Fe) mg; Take 1 tablet (324 mg total) by mouth daily before breakfast  -     Iron Panel (Includes Ferritin, Iron Sat%, Iron, and TIBC); Future    4. Leukocytosis, unspecified type  Assessment & Plan:  CBC with differential was ordered to be completed prior to patient's next office visit to assess for resolution of this.    Orders:  -     CBC and differential; Future    5. Other fatigue  Assessment & Plan:  I did advise patient that her fatigue is most likely being caused by her iron deficiency and vitamin D deficiency however, I would also like to assess a Lyme level to rule this out.    Orders:  -     Lyme Total AB W Reflex to IGM/IGG; Future    6. Class 1 obesity due to excess calories without serious comorbidity with body mass index (BMI) of 31.0 to 31.9 in adult  Assessment & Plan:  I did speak with patient about dietary and exercise modifications which can promote weight loss.  She does have an office visit  scheduled with weight management next month.    Orders:  -     Hemoglobin A1C; Future         Subjective      URI symptoms: Patient reports over the past 10 days she has been experiencing symptoms of rhinorrhea, nasal congestion, headaches, productive cough, and maxillary sinus pressure and congestion.  The patient did test herself for COVID on 1/19/2023 and the result was negative.  Patient denies any recent fever, chills, or shortness of breath.    Vitamin D deficiency: Patient's most recent vitamin D level was low.  Per review of epic patient's prior PCP did prescribe her a 12-week course of a vitamin D supplement.  The patient reports that she did take the medication as directed but has not noticed any improvement in her fatigue.    Iron deficiency: Patient's most recent iron level was slightly low.  Patient is not currently taking an iron supplement.    Leukocytosis: Patient's most recent CBC with differential did show an elevated white blood cell count.    Obesity: The patient is scheduled to be seen by weight management next month.  She reports that she does frequently drink soda and has not been following a strict diet or exercising regularly.          Review of Systems   Constitutional:  Negative for chills and fever.   HENT:  Positive for congestion, postnasal drip, rhinorrhea, sinus pressure (maxillary) and sinus pain. Negative for ear pain and sore throat.    Eyes:  Negative for pain and visual disturbance.   Respiratory:  Positive for cough (productive). Negative for chest tightness, shortness of breath and wheezing.    Cardiovascular:  Negative for chest pain, palpitations and leg swelling.   Gastrointestinal:  Negative for abdominal pain, constipation, diarrhea, nausea and vomiting.   Endocrine: Negative for cold intolerance and heat intolerance.   Genitourinary:  Negative for decreased urine volume, dysuria and hematuria.   Musculoskeletal:  Negative for arthralgias, back pain and myalgias.   Skin:   "Negative for color change and rash.   Allergic/Immunologic: Negative for environmental allergies.   Neurological:  Positive for headaches. Negative for dizziness, seizures, syncope, weakness, light-headedness and numbness.   Hematological:  Negative for adenopathy.   Psychiatric/Behavioral:  Negative for confusion. The patient is not nervous/anxious.    All other systems reviewed and are negative.      Current Outpatient Medications on File Prior to Visit   Medication Sig   • [DISCONTINUED] albuterol (Ventolin HFA) 90 mcg/act inhaler Inhale 1-2 puffs every 6 (six) hours as needed for wheezing (Patient not taking: Reported on 10/25/2023)   • [DISCONTINUED] ALPRAZolam (XANAX) 0.5 mg tablet Take 1 tablet (0.5 mg total) by mouth daily as needed for anxiety (Patient not taking: Reported on 10/25/2023)   • [DISCONTINUED] ergocalciferol (VITAMIN D2) 50,000 units Take 1 capsule (50,000 Units total) by mouth once a week for 12 doses (Patient not taking: Reported on 1/25/2024)   • [DISCONTINUED] methylPREDNISolone 4 MG tablet therapy pack Use as directed on package   • [DISCONTINUED] metroNIDAZOLE (FLAGYL) 500 mg tablet TAKE 1 TABLET BY MOUTH TWICE A DAY FOR 7 DAYS   • [DISCONTINUED] naproxen (NAPROSYN) 500 mg tablet TAKE 1 TABLET BY MOUTH 2 TIMES A DAY AS NEEDED FOR MILD PAIN. (Patient not taking: Reported on 10/25/2023)   • [DISCONTINUED] omeprazole (PriLOSEC) 40 MG capsule Take 40 mg by mouth daily (Patient not taking: Reported on 1/25/2024)   • [DISCONTINUED] SUMAtriptan (IMITREX) 50 mg tablet TAKE 1 TABLET BY MOUTH ONCE AS NEEDED FOR MIGRAINE FOR UP TO 1 DOSE. (Patient not taking: Reported on 1/25/2024)   • [DISCONTINUED] Wellbutrin  MG 24 hr tablet TAKE 1 TABLET (150 MG TOTAL) BY MOUTH DAILY (Patient not taking: Reported on 1/25/2024)       Objective     /86 (BP Location: Left arm, Patient Position: Sitting, Cuff Size: Large)   Pulse 103   Temp 98.9 °F (37.2 °C) (Temporal)   Ht 4' 11\" (1.499 m)   Wt 71.7 " kg (158 lb)   [facility] 11/12/2023   SpO2 98%   BMI 31.91 kg/m²     Physical Exam  Vitals and nursing note reviewed.   Constitutional:       General: She is not in acute distress.     Appearance: Normal appearance. She is not ill-appearing.   HENT:      Head: Normocephalic.      Right Ear: Hearing normal. A middle ear effusion (small) is present.      Left Ear: Hearing normal. A middle ear effusion (small) is present.      Mouth/Throat:      Pharynx: Posterior oropharyngeal erythema present. No pharyngeal swelling, oropharyngeal exudate or uvula swelling.      Tonsils: No tonsillar exudate or tonsillar abscesses.   Eyes:      Conjunctiva/sclera: Conjunctivae normal.   Cardiovascular:      Rate and Rhythm: Normal rate and regular rhythm.      Pulses: Normal pulses.           Carotid pulses are 2+ on the right side and 2+ on the left side.       Radial pulses are 2+ on the right side and 2+ on the left side.        Posterior tibial pulses are 2+ on the right side and 2+ on the left side.      Heart sounds: Normal heart sounds. No murmur heard.  Pulmonary:      Effort: Pulmonary effort is normal. No respiratory distress.      Breath sounds: Normal breath sounds. No decreased breath sounds, wheezing, rhonchi or rales.   Abdominal:      General: Abdomen is flat. Bowel sounds are normal. There is no distension.      Palpations: Abdomen is soft.      Tenderness: There is no abdominal tenderness. There is no guarding.   Musculoskeletal:         General: Normal range of motion.      Cervical back: Normal range of motion.      Right lower leg: No edema.      Left lower leg: No edema.   Skin:     General: Skin is warm and dry.      Capillary Refill: Capillary refill takes less than 2 seconds.   Neurological:      General: No focal deficit present.      Mental Status: She is alert and oriented to person, place, and time.   Psychiatric:         Mood and Affect: Mood normal.         Behavior: Behavior normal.         Thought  Content: Thought content normal.         Judgment: Judgment normal.       BING Hurley

## 2024-01-26 NOTE — ASSESSMENT & PLAN NOTE
I did advise patient that her fatigue is most likely being caused by her iron deficiency and vitamin D deficiency however, I would also like to assess a Lyme level to rule this out.

## 2024-01-26 NOTE — ASSESSMENT & PLAN NOTE
Patient will be started on a daily iron supplement for treatment.  A repeat iron level was ordered to be completed in 1 month.

## 2024-01-26 NOTE — ASSESSMENT & PLAN NOTE
I did speak with patient about dietary and exercise modifications which can promote weight loss.  She does have an office visit scheduled with weight management next month.

## 2024-01-26 NOTE — ASSESSMENT & PLAN NOTE
Repeat vitamin D level was ordered to be completed prior to her next office visit to assess the status of this.

## 2024-01-26 NOTE — ASSESSMENT & PLAN NOTE
CBC with differential was ordered to be completed prior to patient's next office visit to assess for resolution of this.

## 2024-02-21 PROBLEM — J01.00 ACUTE NON-RECURRENT MAXILLARY SINUSITIS: Status: RESOLVED | Noted: 2024-01-25 | Resolved: 2024-02-21

## 2024-03-25 ENCOUNTER — OFFICE VISIT (OUTPATIENT)
Dept: FAMILY MEDICINE CLINIC | Facility: CLINIC | Age: 29
End: 2024-03-25
Payer: COMMERCIAL

## 2024-03-25 VITALS
HEIGHT: 59 IN | OXYGEN SATURATION: 100 % | TEMPERATURE: 97.7 F | WEIGHT: 157 LBS | DIASTOLIC BLOOD PRESSURE: 86 MMHG | BODY MASS INDEX: 31.65 KG/M2 | HEART RATE: 81 BPM | SYSTOLIC BLOOD PRESSURE: 116 MMHG

## 2024-03-25 DIAGNOSIS — E61.1 IRON DEFICIENCY: ICD-10-CM

## 2024-03-25 DIAGNOSIS — G43.809 OTHER MIGRAINE WITHOUT STATUS MIGRAINOSUS, NOT INTRACTABLE: Primary | ICD-10-CM

## 2024-03-25 PROCEDURE — 99214 OFFICE O/P EST MOD 30 MIN: CPT | Performed by: NURSE PRACTITIONER

## 2024-03-25 RX ORDER — KETOROLAC TROMETHAMINE 10 MG/1
10 TABLET, FILM COATED ORAL EVERY 6 HOURS PRN
Qty: 30 TABLET | Refills: 0 | Status: SHIPPED | OUTPATIENT
Start: 2024-03-25

## 2024-03-25 RX ORDER — PROPRANOLOL HYDROCHLORIDE 40 MG/1
40 TABLET ORAL 2 TIMES DAILY
Qty: 60 TABLET | Refills: 2 | Status: SHIPPED | OUTPATIENT
Start: 2024-03-25

## 2024-03-25 RX ORDER — ONDANSETRON 4 MG/1
4 TABLET, ORALLY DISINTEGRATING ORAL EVERY 6 HOURS PRN
Qty: 30 TABLET | Refills: 0 | Status: SHIPPED | OUTPATIENT
Start: 2024-03-25

## 2024-03-25 NOTE — PROGRESS NOTES
Name: María Gusman      : 1995      MRN: [id number]  Encounter Provider: BING Hurley  Encounter Date: 3/25/2024   Encounter department: [facility]    Assessment & Plan     1. Other migraine without status migrainosus, not intractable  Assessment & Plan:  Patient will be trialed on propranolol 40 mg twice daily as a preventative medication for her migraines.  She was prescribed Toradol 10 mg to be used as needed every 6 hours as an abortive medication for migraines.  Zofran 4 mg was also ordered to be used as needed every 6 hours for nausea associated with her migraines.  Neurology referral was also placed for follow-up regarding her symptoms.  Lab work was also ordered to assess for causes of her migraines.  I will have patient return to the office in 1 month to reassess her symptoms.    Orders:  -     Magnesium; Future  -     Phosphorus; Future  -     Comprehensive metabolic panel; Future  -     propranolol (INDERAL) 40 mg tablet; Take 1 tablet (40 mg total) by mouth 2 (two) times a day  -     ketorolac (TORADOL) 10 mg tablet; Take 1 tablet (10 mg total) by mouth every 6 (six) hours as needed (Migraines)  -     Ambulatory Referral to Neurology; Future  -     ondansetron (Zofran ODT) 4 mg disintegrating tablet; Take 1 tablet (4 mg total) by mouth every 6 (six) hours as needed for nausea or vomiting  -     Magnesium  -     Phosphorus  -     Comprehensive metabolic panel    2. Iron deficiency  Assessment & Plan:  Patient does have a repeat iron panel ordered to be completed prior to her next office visit.          Depression Screening and Follow-up Plan: Patient's depression screening was positive with a PHQ-9 score of 7. Patient assessed for underlying major depression. Brief counseling provided and recommend additional follow-up/re-evaluation next office visit.         Subjective      Migraines: Patient reports that recently she has been experiencing more frequent migraines.   Previously she was prescribed Imitrex as an abortive medication however, she reports that the medication has not been helping with her migraines.  She does report that she has been experiencing migraines daily with associated photophobia, phonophobia, and nausea.  She denies noting any other neurological changes.    Iron deficiency: Patient's most recent iron panel showed low iron levels.  Patient is currently managed on a daily iron supplement.      Review of Systems   Constitutional:  Negative for chills and fever.   HENT:  Negative for ear pain and sore throat.         Phonophobia during migraines   Eyes:  Positive for photophobia (during migraines). Negative for pain and visual disturbance.   Respiratory:  Negative for cough, chest tightness, shortness of breath and wheezing.    Cardiovascular:  Negative for chest pain, palpitations and leg swelling.   Gastrointestinal:  Positive for nausea (during migraines). Negative for abdominal pain, constipation, diarrhea and vomiting.   Endocrine: Negative for cold intolerance and heat intolerance.   Genitourinary:  Negative for decreased urine volume, dysuria and hematuria.   Musculoskeletal:  Negative for arthralgias, back pain and myalgias.   Skin:  Negative for color change and rash.   Allergic/Immunologic: Negative for environmental allergies.   Neurological:  Positive for headaches (recurrent migraines). Negative for dizziness, seizures, syncope, weakness, light-headedness and numbness.   Hematological:  Negative for adenopathy.   Psychiatric/Behavioral:  Negative for confusion. The patient is not nervous/anxious.    All other systems reviewed and are negative.      Current Outpatient Medications on File Prior to Visit   Medication Sig   • ferrous sulfate 324 (65 Fe) mg Take 1 tablet (324 mg total) by mouth daily before breakfast   • [DISCONTINUED] methylPREDNISolone 4 MG tablet therapy pack Use as directed on package       Objective     /86 (BP Location:  "Left arm, Patient Position: Sitting, Cuff Size: Adult)   Pulse 81   Temp 97.7 °F (36.5 °C) (Tympanic)   Ht 4' 11\" (1.499 m)   Wt 71.2 kg (157 lb)   SpO2 100%   BMI 31.71 kg/m²     Physical Exam  Vitals and nursing note reviewed.   Constitutional:       General: She is not in acute distress.     Appearance: Normal appearance. She is not ill-appearing.   HENT:      Head: Normocephalic.   Eyes:      Extraocular Movements: Extraocular movements intact.      Right eye: Normal extraocular motion and no nystagmus.      Left eye: Normal extraocular motion and no nystagmus.      Conjunctiva/sclera: Conjunctivae normal.      Pupils: Pupils are equal, round, and reactive to light. Pupils are equal.   Cardiovascular:      Rate and Rhythm: Normal rate and regular rhythm.      Pulses: Normal pulses.           Carotid pulses are 2+ on the right side and 2+ on the left side.       Radial pulses are 2+ on the right side and 2+ on the left side.        Posterior tibial pulses are 2+ on the right side and 2+ on the left side.      Heart sounds: Normal heart sounds. No murmur heard.  Pulmonary:      Effort: Pulmonary effort is normal. No respiratory distress.      Breath sounds: Normal breath sounds. No decreased breath sounds, wheezing, rhonchi or rales.   Abdominal:      General: Abdomen is flat. Bowel sounds are normal. There is no distension.      Palpations: Abdomen is soft.      Tenderness: There is no abdominal tenderness. There is no guarding.   Musculoskeletal:         General: Normal range of motion.      Cervical back: Normal range of motion.      Right lower leg: No edema.      Left lower leg: No edema.   Skin:     General: Skin is warm and dry.      Capillary Refill: Capillary refill takes less than 2 seconds.   Neurological:      General: No focal deficit present.      Mental Status: She is alert and oriented to person, place, and time.   Psychiatric:         Mood and Affect: Mood normal.         Behavior: Behavior " normal.         Thought Content: Thought content normal.         Judgment: Judgment normal.       BING Hurley

## 2024-03-26 ENCOUNTER — TELEPHONE (OUTPATIENT)
Age: 29
End: 2024-03-26

## 2024-03-26 ENCOUNTER — TELEPHONE (OUTPATIENT)
Dept: FAMILY MEDICINE CLINIC | Facility: CLINIC | Age: 29
End: 2024-03-26

## 2024-03-26 NOTE — ASSESSMENT & PLAN NOTE
Patient will be trialed on propranolol 40 mg twice daily as a preventative medication for her migraines.  She was prescribed Toradol 10 mg to be used as needed every 6 hours as an abortive medication for migraines.  Zofran 4 mg was also ordered to be used as needed every 6 hours for nausea associated with her migraines.  Neurology referral was also placed for follow-up regarding her symptoms.  Lab work was also ordered to assess for causes of her migraines.  I will have patient return to the office in 1 month to reassess her symptoms.

## 2024-03-26 NOTE — TELEPHONE ENCOUNTER
Patient called stating she does not want to come to the office to  the [facility] paperwork.  Patient has requested this be faxed to [phone].  Please return patients call if this cannot be accomplished.

## 2024-03-26 NOTE — TELEPHONE ENCOUNTER
Please make patient aware that her [place] paperwork has been completed and faxed.  She can pick this up at the  at her convenience.

## 2024-03-26 NOTE — TELEPHONE ENCOUNTER
Called and spoke with patient to schedule. Patient is now schedule for 04/08 at 3 pm with Dr. Koch .

## 2024-03-27 ENCOUNTER — TELEPHONE (OUTPATIENT)
Age: 29
End: 2024-03-27

## 2024-03-27 NOTE — TELEPHONE ENCOUNTER
Last visit 03/25/2024    Patient stated that she was informed that the [facility] papers that were faxed were incomplete. Only the front of the papers were faxed not the back. Patient is requesting for papers to be faxed and to please notify her, so she can contact her employment. The papers are at the . Thank you.

## 2024-04-01 ENCOUNTER — TELEPHONE (OUTPATIENT)
Dept: BARIATRICS | Facility: CLINIC | Age: 29
End: 2024-04-01

## 2024-04-17 ENCOUNTER — APPOINTMENT (OUTPATIENT)
Dept: LAB | Facility: HOSPITAL | Age: 29
End: 2024-04-17
Payer: COMMERCIAL

## 2024-04-17 DIAGNOSIS — E66.09 CLASS 1 OBESITY DUE TO EXCESS CALORIES WITHOUT SERIOUS COMORBIDITY WITH BODY MASS INDEX (BMI) OF 31.0 TO 31.9 IN ADULT: ICD-10-CM

## 2024-04-17 DIAGNOSIS — E61.1 IRON DEFICIENCY: ICD-10-CM

## 2024-04-17 DIAGNOSIS — D72.829 LEUKOCYTOSIS, UNSPECIFIED TYPE: ICD-10-CM

## 2024-04-17 DIAGNOSIS — R53.83 OTHER FATIGUE: ICD-10-CM

## 2024-04-17 DIAGNOSIS — E55.9 VITAMIN D DEFICIENCY: ICD-10-CM

## 2024-04-17 LAB
BASOPHILS # BLD AUTO: 0.07 THOUSANDS/ÂΜL (ref 0–0.1)
BASOPHILS NFR BLD AUTO: 1 % (ref 0–1)
EOSINOPHIL # BLD AUTO: 0.16 THOUSAND/ÂΜL (ref 0–0.61)
EOSINOPHIL NFR BLD AUTO: 2 % (ref 0–6)
ERYTHROCYTE [DISTWIDTH] IN BLOOD BY AUTOMATED COUNT: 12.9 % (ref 11.6–15.1)
HCT VFR BLD AUTO: 35.6 % (ref 34.8–46.1)
HGB BLD-MCNC: 12 G/DL (ref 11.5–15.4)
IMM GRANULOCYTES # BLD AUTO: 0.05 THOUSAND/UL (ref 0–0.2)
IMM GRANULOCYTES NFR BLD AUTO: 1 % (ref 0–2)
LYMPHOCYTES # BLD AUTO: 3.26 THOUSANDS/ÂΜL (ref 0.6–4.47)
LYMPHOCYTES NFR BLD AUTO: 33 % (ref 14–44)
MCH RBC QN AUTO: 28.7 PG (ref 26.8–34.3)
MCHC RBC AUTO-ENTMCNC: 33.7 G/DL (ref 31.4–37.4)
MCV RBC AUTO: 85 FL (ref 82–98)
MONOCYTES # BLD AUTO: 0.46 THOUSAND/ÂΜL (ref 0.17–1.22)
MONOCYTES NFR BLD AUTO: 5 % (ref 4–12)
NEUTROPHILS # BLD AUTO: 5.98 THOUSANDS/ÂΜL (ref 1.85–7.62)
NEUTS SEG NFR BLD AUTO: 58 % (ref 43–75)
NRBC BLD AUTO-RTO: 0 /100 WBCS
PLATELET # BLD AUTO: 286 THOUSANDS/UL (ref 149–390)
PMV BLD AUTO: 10.5 FL (ref 8.9–12.7)
RBC # BLD AUTO: 4.18 MILLION/UL (ref 3.81–5.12)
WBC # BLD AUTO: 9.98 THOUSAND/UL (ref 4.31–10.16)

## 2024-04-17 PROCEDURE — 82306 VITAMIN D 25 HYDROXY: CPT

## 2024-04-17 PROCEDURE — 86618 LYME DISEASE ANTIBODY: CPT

## 2024-04-17 PROCEDURE — 83550 IRON BINDING TEST: CPT

## 2024-04-17 PROCEDURE — 83540 ASSAY OF IRON: CPT

## 2024-04-17 PROCEDURE — 85025 COMPLETE CBC W/AUTO DIFF WBC: CPT

## 2024-04-17 PROCEDURE — 82728 ASSAY OF FERRITIN: CPT

## 2024-04-17 PROCEDURE — 83036 HEMOGLOBIN GLYCOSYLATED A1C: CPT

## 2024-04-17 PROCEDURE — 36415 COLL VENOUS BLD VENIPUNCTURE: CPT

## 2024-04-18 DIAGNOSIS — E55.9 VITAMIN D DEFICIENCY: Primary | ICD-10-CM

## 2024-04-18 LAB
25(OH)D3 SERPL-MCNC: 9.6 NG/ML (ref 30–100)
B BURGDOR IGG+IGM SER QL IA: NEGATIVE
EST. AVERAGE GLUCOSE BLD GHB EST-MCNC: 120 MG/DL
FERRITIN SERPL-MCNC: 17 NG/ML (ref 11–307)
HBA1C MFR BLD: 5.8 %
IRON SATN MFR SERPL: 21 % (ref 15–50)
IRON SERPL-MCNC: 73 UG/DL (ref 50–212)
TIBC SERPL-MCNC: 346 UG/DL (ref 250–450)
UIBC SERPL-MCNC: 273 UG/DL (ref 155–355)

## 2024-04-18 RX ORDER — ERGOCALCIFEROL 1.25 MG/1
50000 CAPSULE ORAL WEEKLY
Qty: 12 CAPSULE | Refills: 0 | Status: SHIPPED | OUTPATIENT
Start: 2024-04-18 | End: 2024-07-05

## 2024-04-22 ENCOUNTER — TELEPHONE (OUTPATIENT)
Dept: NEUROLOGY | Facility: CLINIC | Age: 29
End: 2024-04-22

## 2024-04-22 NOTE — TELEPHONE ENCOUNTER
Called pt to r/s appt for today 4/22 with .    Provider is leaving early, if patient calls back please r./s for Friday if possible.    Thank you!

## 2024-04-23 ENCOUNTER — HOSPITAL ENCOUNTER (EMERGENCY)
Facility: HOSPITAL | Age: 29
Discharge: HOME | End: 2024-04-23
Attending: EMERGENCY MEDICINE
Payer: COMMERCIAL

## 2024-04-23 VITALS
DIASTOLIC BLOOD PRESSURE: 76 MMHG | TEMPERATURE: 98.2 F | RESPIRATION RATE: 20 BRPM | OXYGEN SATURATION: 99 % | HEART RATE: 95 BPM | SYSTOLIC BLOOD PRESSURE: 129 MMHG

## 2024-04-23 DIAGNOSIS — R68.89 FLU-LIKE SYMPTOMS: Primary | ICD-10-CM

## 2024-04-23 LAB
FLUAV RNA RESP QL NAA+PROBE: NEGATIVE
FLUBV RNA RESP QL NAA+PROBE: NEGATIVE
RSV RNA RESP QL NAA+PROBE: NEGATIVE
S PYO DNA THROAT QL NAA+PROBE: NOT DETECTED
SARS-COV-2 RNA RESP QL NAA+PROBE: NEGATIVE

## 2024-04-23 PROCEDURE — 99284 EMERGENCY DEPT VISIT MOD MDM: CPT

## 2024-04-23 PROCEDURE — 94640 AIRWAY INHALATION TREATMENT: CPT

## 2024-04-23 PROCEDURE — 87651 STREP A DNA AMP PROBE: CPT

## 2024-04-23 PROCEDURE — 99283 EMERGENCY DEPT VISIT LOW MDM: CPT

## 2024-04-23 PROCEDURE — 0241U HB NFCT DS VIR RESP RNA 4 TRGT: CPT

## 2024-04-23 RX ORDER — ALBUTEROL SULFATE 90 UG/1
2 AEROSOL, METERED RESPIRATORY (INHALATION) EVERY 6 HOURS PRN
Qty: 6.7 G | Refills: 0 | Status: SHIPPED | OUTPATIENT
Start: 2024-04-23

## 2024-04-23 RX ORDER — ALBUTEROL SULFATE 2.5 MG/3ML
5 SOLUTION RESPIRATORY (INHALATION) ONCE
Status: COMPLETED | OUTPATIENT
Start: 2024-04-23 | End: 2024-04-23

## 2024-04-23 RX ADMIN — ALBUTEROL SULFATE 5 MG: 2.5 SOLUTION RESPIRATORY (INHALATION) at 18:40

## 2024-04-23 RX ADMIN — IPRATROPIUM BROMIDE 0.5 MG: 0.5 SOLUTION RESPIRATORY (INHALATION) at 18:40

## 2024-04-23 NOTE — ED PROVIDER NOTES
History  Chief Complaint   Patient presents with    Flu Symptoms     Pt reports nasal congestion, sore throat, cough that is dry and head pressure along with fatigue since Friday     29 YOF presents with nasal congestion, sore throat, cough and head pressure since Friday. No fevers. No vomiting and diarrhea. Reports that she ran out of her albuterol inhaler at home and her chest feels tight.         Prior to Admission Medications   Prescriptions Last Dose Informant Patient Reported? Taking?   ergocalciferol (VITAMIN D2) 50,000 units   No No   Sig: Take 1 capsule (50,000 Units total) by mouth once a week for 12 doses   ferrous sulfate 324 (65 Fe) mg   No No   Sig: Take 1 tablet (324 mg total) by mouth daily before breakfast   ketorolac (TORADOL) 10 mg tablet   No No   Sig: Take 1 tablet (10 mg total) by mouth every 6 (six) hours as needed (Migraines)   ondansetron (Zofran ODT) 4 mg disintegrating tablet   No No   Sig: Take 1 tablet (4 mg total) by mouth every 6 (six) hours as needed for nausea or vomiting   propranolol (INDERAL) 40 mg tablet   No No   Sig: Take 1 tablet (40 mg total) by mouth 2 (two) times a day      Facility-Administered Medications: None       Past Medical History:   Diagnosis Date    Anxiety     Asthma     Costochondritis 2023    Depression     Diabetes mellitus (HCC)     Gestational Diabetes    Fatty liver     GERD (gastroesophageal reflux disease)     Headache(784.0)     Migraine     Otitis media     Visual impairment     Yeast infection 2023       Past Surgical History:   Procedure Laterality Date     SECTION      LYMPHADENECTOMY         Family History   Problem Relation Age of Onset    Cancer Mother         Multiple Myeloma    Diabetes Maternal Grandmother      I have reviewed and agree with the history as documented.    E-Cigarette/Vaping    E-Cigarette Use Never User      E-Cigarette/Vaping Substances    Nicotine No     THC No     CBD No     Flavoring No     Other No      Unknown No      Social History     Tobacco Use    Smoking status: Never    Smokeless tobacco: Never   Vaping Use    Vaping status: Never Used   Substance Use Topics    Alcohol use: Not Currently    Drug use: Never       Review of Systems   Constitutional:  Negative for fever.   HENT:  Positive for congestion and sore throat.    Respiratory:  Positive for cough.    Gastrointestinal:  Negative for abdominal pain, diarrhea, nausea and vomiting.       Physical Exam  Physical Exam  Vitals and nursing note reviewed.   Constitutional:       General: She is not in acute distress.     Appearance: Normal appearance. She is well-developed. She is not ill-appearing.   HENT:      Head: Normocephalic and atraumatic.   Eyes:      Conjunctiva/sclera: Conjunctivae normal.   Cardiovascular:      Rate and Rhythm: Normal rate and regular rhythm.      Heart sounds: No murmur heard.  Pulmonary:      Effort: Pulmonary effort is normal. No respiratory distress.      Breath sounds: Normal breath sounds. No wheezing or rales.   Musculoskeletal:         General: No swelling.      Cervical back: Normal range of motion and neck supple.   Skin:     General: Skin is warm and dry.   Neurological:      Mental Status: She is alert.   Psychiatric:         Mood and Affect: Mood normal.         Behavior: Behavior normal.         Vital Signs  ED Triage Vitals [04/23/24 1740]   Temperature Pulse Respirations Blood Pressure SpO2   98.2 °F (36.8 °C) 95 20 129/76 99 %      Temp Source Heart Rate Source Patient Position - Orthostatic VS BP Location FiO2 (%)   Oral Monitor Sitting Right arm --      Pain Score       6           Vitals:    04/23/24 1740   BP: 129/76   Pulse: 95   Patient Position - Orthostatic VS: Sitting         Visual Acuity      ED Medications  Medications   albuterol inhalation solution 5 mg (5 mg Nebulization Given 4/23/24 1840)   ipratropium (ATROVENT) 0.02 % inhalation solution 0.5 mg (0.5 mg Nebulization Given 4/23/24 1840)        Diagnostic Studies  Results Reviewed       Procedure Component Value Units Date/Time    FLU/RSV/COVID - if FLU/RSV clinically relevant [[id number]]  (Normal) Collected: 04/23/24 1840    Lab Status: Final result Specimen: Nares from Nose Updated: 04/23/24 1925     SARS-CoV-2 Negative     INFLUENZA A PCR Negative     INFLUENZA B PCR Negative     RSV PCR Negative    Narrative:      FOR PEDIATRIC PATIENTS - copy/paste COVID Guidelines URL to browser: [email or web address]    SARS-CoV-2 assay is a Nucleic Acid Amplification assay intended for the  qualitative detection of nucleic acid from SARS-CoV-2 in nasopharyngeal  swabs. Results are for the presumptive identification of SARS-CoV-2 RNA.    Positive results are indicative of infection with SARS-CoV-2, the virus  causing COVID-19, but do not rule out bacterial infection or co-infection  with other viruses. Laboratories within the [place] and its  territories are required to report all positive results to the appropriate  public health authorities. Negative results do not preclude SARS-CoV-2  infection and should not be used as the sole basis for treatment or other  patient management decisions. Negative results must be combined with  clinical observations, patient history, and epidemiological information.  This test has not been FDA cleared or approved.    This test has been authorized by FDA under an Emergency Use Authorization  (EUA). This test is only authorized for the duration of time the  declaration that circumstances exist justifying the authorization of the  emergency use of an in vitro diagnostic tests for detection of SARS-CoV-2  virus and/or diagnosis of COVID-19 infection under section 564(b)(1) of  the Act, 21 U.S.C. 360bbb-3(b)(1), unless the authorization is terminated  or revoked sooner. The test has been validated but independent review by FDA  and CLIA is pending.    Test performed using  [employer] GeneXpert: This RT-PCR assay targets N2,  a region unique to SARS-CoV-2. A conserved region in the E-gene was chosen  for pan-Sarbecovirus detection which includes SARS-CoV-2.    According to CMS-2020-01-R, this platform meets the definition of high-throughput technology.    Strep A PCR [[id number]]  (Normal) Collected: 04/23/24 1840    Lab Status: Final result Specimen: Throat Updated: 04/23/24 1915     STREP A PCR Not Detected                   No orders to display              Procedures  Procedures         ED Course                               SBIRT 20yo+      Flowsheet Row Most Recent Value   Initial Alcohol Screen: US AUDIT-C     1. How often do you have a drink containing alcohol? 0 Filed at: 04/23/2024 1809   2. How many drinks containing alcohol do you have on a typical day you are drinking?  0 Filed at: 04/23/2024 1809   3b. FEMALE Any Age, or MALE 65+: How often do you have 4 or more drinks on one occassion? 0 Filed at: 04/23/2024 1809   Audit-C Score 0 Filed at: 04/23/2024 1809   ERNIE: How many times in the past year have you...    Used an illegal drug or used a prescription medication for non-medical reasons? Never Filed at: 04/23/2024 1809                      Medical Decision Making  Pt given DuoNeb here per her request. Pt likely has a viral syndrome. Refill placed for albuterol inhaler.     I have discussed the plan to discharge pt from ED. The patient was discharged in stable condition.  Patient ambulated off the department.  Extensive return to emergency department precautions were discussed.  Follow up with appropriate providers including primary care physician was discussed.  Patient and/or their  primary decision maker expressed understanding.  Patient remained stable during entire emergency department stay.      Amount and/or Complexity of Data Reviewed  Labs: ordered.    Risk  Prescription drug management.             Disposition  Final diagnoses:   Flu-like symptoms     Time  reflects when diagnosis was documented in both MDM as applicable and the Disposition within this note       Time User Action Codes Description Comment    4/23/2024  7:28 PM Jessica Santamaria Add [R68.89] Flu-like symptoms           ED Disposition       ED Disposition   Discharge    Condition   Stable    Date/Time   Tue Apr 23, 2024 1928    Comment   María Gusman discharge to home/self care.                   Follow-up Information    None         Discharge Medication List as of 4/23/2024  7:45 PM        START taking these medications    Details   albuterol (Proventil HFA) 90 mcg/act inhaler Inhale 2 puffs every 6 (six) hours as needed for wheezing, Starting Tue 4/23/2024, Normal           CONTINUE these medications which have NOT CHANGED    Details   ergocalciferol (VITAMIN D2) 50,000 units Take 1 capsule (50,000 Units total) by mouth once a week for 12 doses, Starting Thu 4/18/2024, Until Fri 7/5/2024, Normal      ferrous sulfate 324 (65 Fe) mg Take 1 tablet (324 mg total) by mouth daily before breakfast, Starting Thu 1/25/2024, Normal      ketorolac (TORADOL) 10 mg tablet Take 1 tablet (10 mg total) by mouth every 6 (six) hours as needed (Migraines), Starting Mon 3/25/2024, Normal      ondansetron (Zofran ODT) 4 mg disintegrating tablet Take 1 tablet (4 mg total) by mouth every 6 (six) hours as needed for nausea or vomiting, Starting Mon 3/25/2024, Normal      propranolol (INDERAL) 40 mg tablet Take 1 tablet (40 mg total) by mouth 2 (two) times a day, Starting Mon 3/25/2024, Normal             No discharge procedures on file.    PDMP Review         Value Time User    PDMP Reviewed  Yes 3/25/2024  7:46 PM BING Higgins            ED Provider  Electronically Signed by             Jessica Santamaria PA-C  04/23/24 7134

## 2024-04-25 ENCOUNTER — TELEPHONE (OUTPATIENT)
Age: 29
End: 2024-04-25

## 2024-04-25 NOTE — TELEPHONE ENCOUNTER
Patient called in regard to her appointment tonight.  Patient wanted to change her appointment to a virtual appointment as she has been sick.  Patient was seen in the ED 4/23/24 with symptoms of cough, congestion and heart palpitations.  Patient is still experiencing these symptoms.  Patient disconnected call before I could get a clinical answer as to whether or not she can be seen virtually.

## 2024-04-29 ENCOUNTER — TELEMEDICINE (OUTPATIENT)
Dept: FAMILY MEDICINE CLINIC | Facility: CLINIC | Age: 29
End: 2024-04-29
Payer: COMMERCIAL

## 2024-04-29 ENCOUNTER — TELEPHONE (OUTPATIENT)
Age: 29
End: 2024-04-29

## 2024-04-29 DIAGNOSIS — E61.1 IRON DEFICIENCY: ICD-10-CM

## 2024-04-29 DIAGNOSIS — E55.9 VITAMIN D DEFICIENCY: ICD-10-CM

## 2024-04-29 DIAGNOSIS — H10.32 ACUTE BACTERIAL CONJUNCTIVITIS OF LEFT EYE: ICD-10-CM

## 2024-04-29 DIAGNOSIS — E66.09 CLASS 1 OBESITY DUE TO EXCESS CALORIES WITHOUT SERIOUS COMORBIDITY WITH BODY MASS INDEX (BMI) OF 31.0 TO 31.9 IN ADULT: ICD-10-CM

## 2024-04-29 DIAGNOSIS — R73.9 HYPERGLYCEMIA: ICD-10-CM

## 2024-04-29 DIAGNOSIS — J40 BRONCHITIS: ICD-10-CM

## 2024-04-29 DIAGNOSIS — G43.809 OTHER MIGRAINE WITHOUT STATUS MIGRAINOSUS, NOT INTRACTABLE: Primary | ICD-10-CM

## 2024-04-29 PROBLEM — D72.829 LEUKOCYTOSIS: Status: RESOLVED | Noted: 2024-01-25 | Resolved: 2024-04-29

## 2024-04-29 PROCEDURE — 99214 OFFICE O/P EST MOD 30 MIN: CPT | Performed by: NURSE PRACTITIONER

## 2024-04-29 RX ORDER — KETOCONAZOLE 20 MG/G
1 CREAM TOPICAL 2 TIMES DAILY
COMMUNITY
Start: 2024-04-12 | End: 2024-04-26 | Stop reason: ALTCHOICE

## 2024-04-29 RX ORDER — ERGOCALCIFEROL 1.25 MG/1
50000 CAPSULE ORAL WEEKLY
Qty: 12 CAPSULE | Refills: 0 | Status: SHIPPED | OUTPATIENT
Start: 2024-04-29 | End: 2024-07-16

## 2024-04-29 RX ORDER — TIRZEPATIDE 2.5 MG/.5ML
2.5 INJECTION, SOLUTION SUBCUTANEOUS WEEKLY
Qty: 2 ML | Refills: 0 | Status: SHIPPED | OUTPATIENT
Start: 2024-04-29 | End: 2024-05-27

## 2024-04-29 RX ORDER — AZITHROMYCIN 250 MG/1
TABLET, FILM COATED ORAL DAILY
Qty: 6 TABLET | Refills: 0 | Status: SHIPPED | OUTPATIENT
Start: 2024-04-29 | End: 2024-05-04

## 2024-04-29 RX ORDER — TRIAMCINOLONE ACETONIDE 1 MG/G
OINTMENT TOPICAL
COMMUNITY
Start: 2024-04-12

## 2024-04-29 NOTE — PATIENT INSTRUCTIONS
Basic Carbohydrate Counting   AMBULATORY CARE:   Carbohydrate counting  is a way to plan your meals by counting the amount of carbohydrate in foods. Carbohydrates are the sugars, starches, and fiber found in fruit, grains, vegetables, and milk products. Carbohydrates increase your blood sugar levels. Carbohydrate counting can help you eat the right amount of carbohydrate to keep your blood sugar levels under control.   What you need to know about planning meals using carbohydrate counting:  A dietitian or healthcare provider will help you develop a healthy meal plan that works best for you. You will be taught how much carbohydrate to eat or drink for each meal and snack. Your meal plan will be based on your age, weight, usual food intake, and physical activity level. If you have diabetes, it will also include your blood sugar levels and diabetes medicine. Once you know how much carbohydrate you should eat, you can decide what type of food you want to eat.     You will need to know what foods contain carbohydrate and how much they contain. Keep track of the amount of carbohydrate in meals and snacks in order to follow your meal plan. Do not avoid carbohydrates or skip meals. Your blood sugar may fall too low if you do not eat enough carbohydrate or you skip meals.     Foods that contain carbohydrate:   Breads:  Each serving of food listed below contains about 15 g of carbohydrate .     1 slice of bread (1 ounce) or 1 flour or corn tortilla (6 inch)     ½ of a hamburger bun or ¼ of a large bagel (about 1 ounce)     1 pancake (about 4 inches across and ¼ inch thick)     Cereals and grains:  Serving sizes of ready-to-eat cereals vary. Look at the serving size and the total carbohydrate amount listed on the food label. Each serving of food listed below contains about 15 g of carbohydrate .     ¾ cup of dry, unsweetened, ready-to-eat cereal or ¼ cup of low-fat granola      ½ cup of oatmeal or other cooked cereal      ?  cup of cooked rice or pasta     Starchy vegetables and beans:  Each serving of food listed below contains about 15 g of carbohydrate .     ½ cup of corn, green peas, sweet potatoes, or mashed potatoes     ¼ of a large baked potato     ½ cup of beans, lentils, and peas (garbanzo, clinton, kidney, white, split, black-eyed)     Crackers and snacks:  Each serving of food listed below contains about 15 g of carbohydrate .     3 ami cracker squares or 8 animal crackers      6 saltine-type crackers     3 cups of popcorn or ¾ ounce of pretzels, potato chips, or tortilla chips     Fruit:  Each serving of food listed below contains about 15 g of carbohydrate .     1 small (4 ounce) piece of fresh fruit or ¾ to 1 cup of fresh fruit     ½ cup of canned or frozen fruit, packed in natural juice     ½ cup (4 ounces) of unsweetened fruit juice     2 tablespoons of dried fruit     Desserts or sugary foods:  Each serving of food listed below contains about 15 g of carbohydrate .     2-inch square unfrosted cake or brownie      2 small cookies     ½ cup of ice cream, frozen yogurt, or nondairy frozen yogurt     ¼ cup of sherbet or sorbet     1 tablespoon of regular syrup, jam, or jelly     2 tablespoons of light syrup     Milk and yogurt:  Foods from the milk group contain about 12 g of carbohydrate per serving.     1 cup of fat-free or low-fat milk     1 cup of soy milk     ? cup of fat-free, yogurt sweetened with artificial sweetener     Non-starchy vegetables:  Each serving contains about 5 g of carbohydrate . Three servings of non-starch vegetables count as 1 carbohydrate serving.      ½ cup of cooked vegetables or 1 cup of raw vegetables. This includes beets, broccoli, cabbage, cauliflower, cucumber, mushrooms, tomatoes, and zucchini     ½ cup of vegetable juice     How to use carbohydrate counting to plan meals:   Count carbohydrate amounts using serving sizes:       Pasta dinner example:  You plan to have pasta, tossed salad,  and an 8-ounce glass of milk. Your healthcare provider tells you that you may have 4 carbohydrate servings for dinner. One carbohydrate serving of pasta is ? cup. One cup of pasta will equal 3 carbohydrate servings. An 8-ounce glass of milk will count as 1 carbohydrate serving. These amounts of food would equal 4 carbohydrate servings. One cup of tossed salad does not count toward your carbohydrate servings.         Count carbohydrate amounts using food labels:  Find the total amount of carbohydrate in a packaged food by reading the food label. Food labels tell you the serving size of the food and the total carbohydrate amount in each serving. Find the serving size on the food label and then decide how many servings you will eat. Multiply the number of servings you plan to eat by the carbohydrate amount per serving.      Granola bar snack example:  Your meal plan allows you to have 2 carbohydrate servings (30 grams) of carbohydrate for a snack. You plan to eat 1 package of granola bars, which contains 2 bars. According to the food label, the serving size of food in this package is 1 bar. Each serving (1 bar) contains 25 grams of carbohydrate. The total amount of carbohydrate in this package of granola bars would be 50 g. Based on your meal plan, you should eat only 1 bar.        Follow up with your doctor as directed:  Write down your questions so you remember to ask them during your visits.  © Copyright [employer] 2023 Information is for End User's use only and may not be sold, redistributed or otherwise used for commercial purposes.  The above information is an  only. It is not intended as medical advice for individual conditions or treatments. Talk to your doctor, nurse or pharmacist before following any medical regimen to see if it is safe and effective for you.

## 2024-04-29 NOTE — LETTER
April 29, 2024     Patient: María Gusman  YOB: 1995  Date of Visit: 4/29/2024      To Whom it May Concern:    María Gusman is under my professional care. María was seen in my office on 4/29/2024. María may return to work on 4/30/2024 with no restrictions.    If you have any questions or concerns, please don't hesitate to call.         Sincerely,          BING Higgins        CC: No Recipients

## 2024-04-29 NOTE — ASSESSMENT & PLAN NOTE
Zepbound was ordered to be used weekly to help with weight loss.  Patient was advised that if she is tolerating medication well after 1 month she should make the office aware and the dosage will be increased.

## 2024-04-29 NOTE — ASSESSMENT & PLAN NOTE
Patient will be started on a weekly vitamin D supplement which she will take over the next 12 weeks.  She was also advised to begin over-the-counter vitamin D3 2000 IU daily.  Repeat vitamin D level ordered to be completed prior to her next office visit.

## 2024-04-29 NOTE — ASSESSMENT & PLAN NOTE
Azithromycin was ordered for treatment of acute bronchitis.  Patient was also advised to continue using albuterol inhaler as needed for any shortness of breath or wheezing.

## 2024-04-29 NOTE — TELEPHONE ENCOUNTER
Pt called in stating she is having pink eye since Saturday would want to change today office visit to virtual visit. Did warm transfer the call to the practice and was answered by Jessica and told can change to virtual visit informed the same to patient. Thanks

## 2024-04-29 NOTE — PROGRESS NOTES
Virtual Regular Visit    Verification of patient location:    Patient is located at Home in the following state in which I hold an active license [place]      Assessment/Plan:    Problem List Items Addressed This Visit     Migraine - Primary     Patient will be maintained on current dosage of propranolol.         Vitamin D deficiency     Patient will be started on a weekly vitamin D supplement which she will take over the next 12 weeks.  She was also advised to begin over-the-counter vitamin D3 2000 IU daily.  Repeat vitamin D level ordered to be completed prior to her next office visit.         Relevant Medications    ergocalciferol (VITAMIN D2) 50,000 units    Other Relevant Orders    Vitamin D 25 hydroxy    Iron deficiency     Well-controlled on current regimen.         Class 1 obesity due to excess calories without serious comorbidity with body mass index (BMI) of 31.0 to 31.9 in adult     Zepbound was ordered to be used weekly to help with weight loss.  Patient was advised that if she is tolerating medication well after 1 month she should make the office aware and the dosage will be increased.         Relevant Medications    tirzepatide (Zepbound) 2.5 mg/0.5 mL auto-injector    Bronchitis     Azithromycin was ordered for treatment of acute bronchitis.  Patient was also advised to continue using albuterol inhaler as needed for any shortness of breath or wheezing.         Relevant Medications    azithromycin (Zithromax) 250 mg tablet    Acute bacterial conjunctivitis of left eye     Azithromycin will cover for acute bacterial conjunctivitis of the left eye.         Relevant Medications    azithromycin (Zithromax) 250 mg tablet    Hyperglycemia     Patient was advised to continue to limit sugar and carbohydrates in her diet.                 Reason for visit is   Chief Complaint   Patient presents with   • Virtual Regular Visit          Encounter provider BING Higgins      Recent Visits  No visits were found  meeting these conditions.  Showing recent visits within past 7 days and meeting all other requirements  Today's Visits  Date Type Provider Dept   04/29/24 Telemedicine [facility]   Showing today's visits and meeting all other requirements  Future Appointments  No visits were found meeting these conditions.  Showing future appointments within next 150 days and meeting all other requirements       The patient was identified by name and date of birth. María Gusman was informed that this is a telemedicine visit and that the visit is being conducted through the Epic Embedded platform. She agrees to proceed..  My office door was closed. No one else was in the room.  She acknowledged consent and understanding of privacy and security of the video platform. The patient has agreed to participate and understands they can discontinue the visit at any time.    Patient is aware this is a billable service.     Subjective  María Gusman is a 29 y.o. female  .      Migraines: At patient's last office visit she was started on propranolol 40 mg twice daily for migraine prophylaxis and Toradol 10 mg every 6 hours as needed and Zofran 4 mg every 6 hours as needed for acute migraines.  The patient reports that since beginning the propranolol she has noted much less migraines and when she does have a migraine her symptoms are nowhere near as severe.  She reports that she is only needed to use Toradol once since her last office visit and it did resolve her symptoms.    Iron deficiency: Patient's most recent iron panel was noted to be normal.  Patient is currently taking a daily iron supplement.    Vitamin D deficiency: Patient's most recent vitamin D level was noted to be very low.    Hyperglycemia: Patient's most recent hemoglobin A1c was 5.8 putting the patient in the prediabetes range.  Patient denies polydipsia, polyphagia, or polyuria.    Obesity: Patient reports that she has been attempting  to exercise more and diet but she has been having difficulty losing weight.  She is interested in trialing an injection to help with weight loss.    Bronchitis/conjunctivitis: Patient was seen in the ED on 2024 due to chest congestion, nasal congestion, and shortness of breath.  The patient was tested for COVID, influenza, and RSV at that time and all were found to be negative.  It was noted that the patient's symptoms improved after receiving nebulizer treatments.  Patient was diagnosed with bronchitis and was prescribed an albuterol inhaler to use as needed at home.  She reports that the shortness of breath has resolved at this point but she continues to note chest congestion, sinus congestion, and a recurrent productive cough.  She also reports that over the past 2 days she has noted redness, irritation, and green/yellow discharge from her left eye.         Past Medical History:   Diagnosis Date   • Anxiety    • Asthma    • Costochondritis 2023   • Depression    • Diabetes mellitus (HCC)     Gestational Diabetes   • Fatty liver    • GERD (gastroesophageal reflux disease)    • Headache(784.0)    • Leukocytosis 2024   • Migraine    • Otitis media    • Visual impairment    • Yeast infection 2023       Past Surgical History:   Procedure Laterality Date   •  SECTION     • LYMPHADENECTOMY         Current Outpatient Medications   Medication Sig Dispense Refill   • azithromycin (Zithromax) 250 mg tablet Take 2 tablets (500 mg total) by mouth daily for 1 day, THEN 1 tablet (250 mg total) daily for 4 days. 6 tablet 0   • ergocalciferol (VITAMIN D2) 50,000 units Take 1 capsule (50,000 Units total) by mouth once a week for 12 doses 12 capsule 0   • tirzepatide (Zepbound) 2.5 mg/0.5 mL auto-injector Inject 0.5 mL (2.5 mg total) under the skin once a week for 28 days 2 mL 0   • triamcinolone (KENALOG) 0.1 % ointment APPLY TOPICALLY TO THE AFFECTED AREA EVERY 8 HOURS AS NEEDED FOR RASH     •  albuterol (Proventil HFA) 90 mcg/act inhaler Inhale 2 puffs every 6 (six) hours as needed for wheezing 6.7 g 0   • ferrous sulfate 324 (65 Fe) mg Take 1 tablet (324 mg total) by mouth daily before breakfast 90 tablet 1   • ketorolac (TORADOL) 10 mg tablet Take 1 tablet (10 mg total) by mouth every 6 (six) hours as needed (Migraines) 30 tablet 0   • ondansetron (Zofran ODT) 4 mg disintegrating tablet Take 1 tablet (4 mg total) by mouth every 6 (six) hours as needed for nausea or vomiting 30 tablet 0   • propranolol (INDERAL) 40 mg tablet Take 1 tablet (40 mg total) by mouth 2 (two) times a day 60 tablet 2     No current facility-administered medications for this visit.        No Known Allergies    Review of Systems   Constitutional:  Negative for chills and fever.   HENT:  Positive for congestion, postnasal drip, rhinorrhea and sinus pressure. Negative for ear pain, sinus pain and sore throat.    Eyes:  Positive for pain (left eye irritation), discharge (left) and redness (left). Negative for visual disturbance.   Respiratory:  Positive for cough (productive). Negative for chest tightness, shortness of breath and wheezing.    Cardiovascular:  Negative for chest pain, palpitations and leg swelling.   Gastrointestinal:  Negative for abdominal pain, constipation, diarrhea, nausea and vomiting.   Endocrine: Negative for cold intolerance and heat intolerance.   Genitourinary:  Negative for decreased urine volume, dysuria and hematuria.   Musculoskeletal:  Negative for arthralgias, back pain and myalgias.   Skin:  Negative for color change and rash.   Allergic/Immunologic: Negative for environmental allergies.   Neurological:  Positive for headaches (improved). Negative for dizziness, tremors, seizures, syncope, facial asymmetry, speech difficulty, weakness, light-headedness and numbness.   Hematological:  Negative for adenopathy.   Psychiatric/Behavioral:  Negative for confusion. The patient is not nervous/anxious.    All  other systems reviewed and are negative.      Video Exam    There were no vitals filed for this visit.    Physical Exam  Vitals reviewed: limited due to video visit.   Constitutional:       General: She is not in acute distress.     Appearance: Normal appearance. She is not ill-appearing.   Neurological:      Mental Status: She is alert.          Visit Time  Total Visit Duration: 20 minutes

## 2024-05-01 ENCOUNTER — TELEPHONE (OUTPATIENT)
Age: 29
End: 2024-05-01

## 2024-05-01 ENCOUNTER — OFFICE VISIT (OUTPATIENT)
Dept: OBGYN CLINIC | Facility: CLINIC | Age: 29
End: 2024-05-01
Payer: COMMERCIAL

## 2024-05-01 VITALS
BODY MASS INDEX: 31.81 KG/M2 | HEIGHT: 59 IN | DIASTOLIC BLOOD PRESSURE: 74 MMHG | SYSTOLIC BLOOD PRESSURE: 116 MMHG | WEIGHT: 157.8 LBS

## 2024-05-01 DIAGNOSIS — R10.2 PAIN, FEMALE PELVIC: ICD-10-CM

## 2024-05-01 DIAGNOSIS — Z86.19 HISTORY OF CHLAMYDIA INFECTION: ICD-10-CM

## 2024-05-01 DIAGNOSIS — Z11.3 SCREENING EXAMINATION FOR STD (SEXUALLY TRANSMITTED DISEASE): ICD-10-CM

## 2024-05-01 DIAGNOSIS — N76.0 RECURRENT VAGINITIS: ICD-10-CM

## 2024-05-01 DIAGNOSIS — N92.6 IRREGULAR MENSTRUAL CYCLE: Primary | ICD-10-CM

## 2024-05-01 PROCEDURE — 87510 GARDNER VAG DNA DIR PROBE: CPT | Performed by: NURSE PRACTITIONER

## 2024-05-01 PROCEDURE — 99204 OFFICE O/P NEW MOD 45 MIN: CPT | Performed by: NURSE PRACTITIONER

## 2024-05-01 PROCEDURE — 87491 CHLMYD TRACH DNA AMP PROBE: CPT | Performed by: NURSE PRACTITIONER

## 2024-05-01 PROCEDURE — 87660 TRICHOMONAS VAGIN DIR PROBE: CPT | Performed by: NURSE PRACTITIONER

## 2024-05-01 PROCEDURE — 87591 N.GONORRHOEAE DNA AMP PROB: CPT | Performed by: NURSE PRACTITIONER

## 2024-05-01 PROCEDURE — 87480 CANDIDA DNA DIR PROBE: CPT | Performed by: NURSE PRACTITIONER

## 2024-05-01 NOTE — PROGRESS NOTES
Assessment/Plan:    1. Irregular menstrual cycle    - DHEA-sulfate; Future  - Estradiol; Future  - Follicle stimulating hormone; Future  - Insulin, fasting; Future  - Luteinizing hormone; Future  - Prolactin; Future  - Testosterone, free, total; Future  - TSH, 3rd generation with Free T4 reflex; Future  - 17-Hydroxyprogesterone; Future    2. Recurrent vaginitis  Will treat accordingly.  If BV will try either vaginal metrogel or clindamycin.    - VAGINOSIS DNA PROBE    3. Pain, female pelvic  Likely related to mittelschmerz.  Reassured.    4. History of chlamydia infection  Plan HSG to evaluate patency of fallopian tubes.    5. Screening examination for STD (sexually transmitted disease)    - Chlamydia/GC amplified DNA by PCR  - VAGINOSIS DNA PROBE    Vaginal and G/C cultures collected.  Will treat accordingly.  Cervical mucous appears ovulatory, thus pelvic pain is likely related to ovulation, and since 9/2023 ultrasound was normal will not repeat at this time.  Advised to time sexual relations today and tomorrow.  If no period in 2-3 wks instructed to check a urine pregnancy test.  If she does  have a period advised to have hormonal panel drawn on cycle day 1, 2 or 3.  Requested she forward me her partner's semen analysis result when it is available.  Will plan hysterosalpingogram given patient's history of chlamydia.    RV for yearly and pap update as well as to review her lab results/treatment plan    I have spent a total time of 60 minutes on 05/01/24 in caring for this patient including Diagnostic results, Instructions for management, Patient and family education, Impressions, Counseling / Coordination of care, Documenting in the medical record, Reviewing / ordering tests, medicine, procedures  , and Obtaining or reviewing history  .    2:25-3:25 pm    Subjective:      Patient ID: María Gusman is a 29 y.o. female.    HPI  NEW PATIENT PROBLEM  CC:    27 yo     #1 - Concerned about fertility.  She has been  "trying to conceive for 18 months.  She has had one pregnancy, 2015, different partner, took a few months to conceive.    Her present partner of >3 yrs has never fathered a baby-- he is seeing a urologist for testicular pain and they have ordered a semen analysis.    Her cycles are irregular, ranging Q 26-54 days.  Usually last 6-7d.  Her period in 2024 was extremely heavy with large clots.  Labs per [facility] ob[facility]yn were reviewed:  2023 hormonal panel normal, except for progesterone which was done in follicular phase.  23 pelvic US: normal    Pertinent hx;  2015 chlamydia, treated  Hx , 40w, emergent due to placental abruption during induction of labor; complicated by gestational diabetes.    #2- vaginal discharge, has to wear a panty liner daily.  Discharge is sometimes white or yellow.  Has an unusual odor but not fishy.  Denies itching or burning.  \"Has a lot of pelvic pain\", 2023 pelvic US was normal.  Pain is mainly with her cycles, but not always sure.  Today she is with pain and she is cycle day 17 of menstrual cycle.  Denies UTI symptoms.  No recent antibiotic treat, however will be starting a z tacos today for sinusitis.  Hx of BV on vaginal culture in 2023, at [facility].  Treated with metronidazole however she was unable to tolerate it due to dizziness and stopped after 2 days.      2018 pap negative  Hx of chlamydia, 2015, treated.    The following portions of the patient's history were reviewed and updated as appropriate: allergies, current medications, past family history, past medical history, past social history, past surgical history, and problem list.    Review of Systems   Constitutional:  Negative for chills and fever.   Gastrointestinal:  Negative for constipation and diarrhea.   Genitourinary:  Positive for menstrual problem, pelvic pain and vaginal discharge. Negative for difficulty urinating, dysuria, frequency, genital sores, urgency and vaginal bleeding.         Objective:    BP " "116/74 (BP Location: Left arm, Patient Position: Sitting, Cuff Size: Standard)   Ht 4' 11\" (1.499 m)   Wt 71.6 kg (157 lb 12.8 oz)   LMP 04/15/2024 (Approximate)   BMI 31.87 kg/m²      Physical Exam  Constitutional:       General: She is not in acute distress.     Appearance: Normal appearance. She is well-developed. She is not ill-appearing or diaphoretic.      Comments: Bmi 31.9   HENT:      Head: Normocephalic and atraumatic.   Eyes:      Pupils: Pupils are equal, round, and reactive to light.   Pulmonary:      Effort: Pulmonary effort is normal.   Abdominal:      General: Abdomen is flat.      Palpations: Abdomen is soft.   Genitourinary:     General: Normal vulva.      Exam position: Lithotomy position.      Labia:         Right: No rash, tenderness, lesion or injury.         Left: No rash, tenderness, lesion or injury.       Vagina: No signs of injury and foreign body. No vaginal discharge, erythema, tenderness or bleeding.      Cervix: No cervical motion tenderness, discharge or friability.      Uterus: Not enlarged and not tender.       Adnexa:         Right: No mass or tenderness.          Left: No mass or tenderness.        Comments: Cervical mucus abundant and clear- ovulatory  Skin:     General: Skin is warm and dry.   Neurological:      General: No focal deficit present.      Mental Status: She is alert and oriented to person, place, and time.   Psychiatric:         Mood and Affect: Mood normal.         Behavior: Behavior normal.         Thought Content: Thought content normal.         Judgment: Judgment normal.           "

## 2024-05-01 NOTE — TELEPHONE ENCOUNTER
PA for Zepbound 2.5mg    Submitted via    []CMM-KEY   [x]Surescripts-Case ID #   []Faxed to plan   []Other website   []Phone call Case ID #     Office notes sent, clinical questions answered. Awaiting determination    Turnaround time for your insurance to make a decision on your Prior Authorization can take 7-21 business days.

## 2024-05-01 NOTE — PATIENT INSTRUCTIONS
Weight Management   WHAT YOU NEED TO KNOW:   Being overweight increases your risk of health conditions such as heart disease, high blood pressure, type 2 diabetes, and certain types of cancer. It can also increase your risk for osteoarthritis, sleep apnea, and other respiratory problems. Aim for a slow, steady weight loss. Even a small amount of weight loss can lower your risk of health problems.  DISCHARGE INSTRUCTIONS:   How to lose weight safely:  A safe and healthy way to lose weight is to eat fewer calories and get regular exercise.  You can lose up about 1 pound a week by decreasing the number of calories you eat by 500 calories each day. You can decrease calories by eating smaller portion sizes or by cutting out high-calorie foods. Read labels to find out how many calories are in the foods you eat.         You can also burn calories with exercise such as walking, swimming, or biking. You will be more likely to keep weight off if you make these changes part of your lifestyle. Exercise at least 30 minutes per day on most days of the week. You can also fit in more physical activity by taking the stairs instead of the elevator or parking farther away from stores. Ask your healthcare provider about the best exercise plan for you.       Healthy meal plan for weight management:  A healthy meal plan includes a variety of foods, contains fewer calories, and helps you stay healthy. A healthy meal plan includes the following:     Eat whole-grain foods more often.  A healthy meal plan should contain fiber. Fiber is the part of grains, fruits, and vegetables that is not broken down by your body. Whole-grain foods are healthy and provide extra fiber in your diet. Some examples of whole-grain foods are whole-wheat breads and pastas, oatmeal, brown rice, and bulgur.    Eat a variety of vegetables every day.  Include dark, leafy greens such as spinach, kale, paige greens, and mustard greens. Eat yellow and orange vegetables  such as carrots, sweet potatoes, and winter squash.     Eat a variety of fruits every day.  Choose fresh or canned fruit (canned in its own juice or light syrup) instead of juice. Fruit juice has very little or no fiber.    Eat low-fat dairy foods.  Drink fat-free (skim) milk or 1% milk. Eat fat-free yogurt and low-fat cottage cheese. Try low-fat cheeses such as mozzarella and other reduced-fat cheeses.    Choose meat and other protein foods that are low in fat.  Choose beans or other legumes such as split peas or lentils. Choose fish, skinless poultry (chicken or turkey), or lean cuts of red meat (beef or pork). Before you cook meat or poultry, cut off any visible fat.     Use less fat and oil.  Try baking foods instead of frying them. Add less fat, such as margarine, sour cream, regular salad dressing, and mayonnaise to foods. Eat fewer high-fat foods. Some examples of high-fat foods include french fries, doughnuts, ice cream, and cakes.    Eat fewer sweets.  Limit foods and drinks that are high in sugar. This includes candy, cookies, regular soda, and sweetened drinks.  Ways to decrease calories:   Eat smaller portions.     Use a small plate with smaller servings.    Do not eat second helpings.    When you eat at a restaurant, ask for a box and place half of your meal in the box before you eat.    Share an entrée with someone else.    Replace high-calorie snacks with healthy, low-calorie snacks.     Choose fresh fruit, vegetables, fat-free rice cakes, or air-popped popcorn instead of potato chips, nuts, or chocolate.    Choose water or calorie-free drinks instead of soda or sweetened drinks.    Do not shop for groceries when you are hungry.  You may be more likely to make unhealthy food choices. Take a grocery list of healthy foods and shop after you have eaten.    Eat regular meals. Do not skip meals. Skipping meals can lead to overeating later in the day. This can make it harder for you to lose weight. Eat a  healthy snack in place of a meal if you do not have time to eat a regular meal. Talk with a dietitian to help you create a meal plan and schedule that is right for you.    Other things to consider as you try to lose weight:   Be aware of situations that may give you the urge to overeat, such as eating while watching television. Find ways to avoid these situations. For example, read a book, go for a walk, or do crafts.    Meet with a weight loss support group or friends who are also trying to lose weight. This may help you stay motivated to continue working on your weight loss goals.    © Copyright [employer] 2023 Information is for End User's use only and may not be sold, redistributed or otherwise used for commercial purposes.  The above information is an  only. It is not intended as medical advice for individual conditions or treatments. Talk to your doctor, nurse or pharmacist before following any medical regimen to see if it is safe and effective for you.

## 2024-05-02 DIAGNOSIS — B96.89 BACTERIAL VAGINOSIS: Primary | ICD-10-CM

## 2024-05-02 DIAGNOSIS — N76.0 BACTERIAL VAGINOSIS: Primary | ICD-10-CM

## 2024-05-02 DIAGNOSIS — L50.9 URTICARIA: Primary | ICD-10-CM

## 2024-05-02 LAB
C TRACH DNA SPEC QL NAA+PROBE: NEGATIVE
CANDIDA RRNA VAG QL PROBE: NOT DETECTED
G VAGINALIS RRNA GENITAL QL PROBE: DETECTED
N GONORRHOEA DNA SPEC QL NAA+PROBE: NEGATIVE
T VAGINALIS RRNA GENITAL QL PROBE: NOT DETECTED

## 2024-05-02 RX ORDER — CLINDAMYCIN HYDROCHLORIDE 300 MG/1
300 CAPSULE ORAL 2 TIMES DAILY
Qty: 14 CAPSULE | Refills: 0 | Status: SHIPPED | OUTPATIENT
Start: 2024-05-02 | End: 2024-05-09

## 2024-05-14 DIAGNOSIS — R68.89 FLU-LIKE SYMPTOMS: ICD-10-CM

## 2024-05-14 DIAGNOSIS — E66.09 CLASS 1 OBESITY DUE TO EXCESS CALORIES WITHOUT SERIOUS COMORBIDITY WITH BODY MASS INDEX (BMI) OF 31.0 TO 31.9 IN ADULT: ICD-10-CM

## 2024-05-14 RX ORDER — TIRZEPATIDE 2.5 MG/.5ML
2.5 INJECTION, SOLUTION SUBCUTANEOUS WEEKLY
Qty: 2 ML | Refills: 0 | Status: SHIPPED | OUTPATIENT
Start: 2024-05-14 | End: 2024-06-11

## 2024-05-14 RX ORDER — ALBUTEROL SULFATE 90 UG/1
2 AEROSOL, METERED RESPIRATORY (INHALATION) EVERY 6 HOURS PRN
Qty: 18 G | Refills: 0 | Status: SHIPPED | OUTPATIENT
Start: 2024-05-14

## 2024-05-29 PROBLEM — H10.32 ACUTE BACTERIAL CONJUNCTIVITIS OF LEFT EYE: Status: RESOLVED | Noted: 2024-04-29 | Resolved: 2024-05-29

## 2024-06-06 ENCOUNTER — TELEPHONE (OUTPATIENT)
Age: 29
End: 2024-06-06

## 2024-06-06 DIAGNOSIS — Z11.1 SCREENING-PULMONARY TB: Primary | ICD-10-CM

## 2024-06-06 NOTE — TELEPHONE ENCOUNTER
Patient called with questions regarding school physical. Patient self scheduled a visit on 6/12/24 for a physical for school and a TB test. Patient was asking if the TB test could be read on 6/14/24. I was able to answer all of the patient's questions. If there are any questions or concerns please call the patient at [phone]

## 2024-06-12 ENCOUNTER — APPOINTMENT (OUTPATIENT)
Dept: LAB | Facility: HOSPITAL | Age: 29
End: 2024-06-12
Payer: COMMERCIAL

## 2024-06-12 ENCOUNTER — OFFICE VISIT (OUTPATIENT)
Dept: FAMILY MEDICINE CLINIC | Facility: CLINIC | Age: 29
End: 2024-06-12
Payer: COMMERCIAL

## 2024-06-12 VITALS
OXYGEN SATURATION: 99 % | DIASTOLIC BLOOD PRESSURE: 80 MMHG | HEART RATE: 100 BPM | SYSTOLIC BLOOD PRESSURE: 110 MMHG | WEIGHT: 157 LBS | BODY MASS INDEX: 31.65 KG/M2 | HEIGHT: 59 IN

## 2024-06-12 DIAGNOSIS — E61.1 IRON DEFICIENCY: ICD-10-CM

## 2024-06-12 DIAGNOSIS — F32.A DEPRESSION, UNSPECIFIED DEPRESSION TYPE: ICD-10-CM

## 2024-06-12 DIAGNOSIS — L50.9 URTICARIA: ICD-10-CM

## 2024-06-12 DIAGNOSIS — R73.9 HYPERGLYCEMIA: ICD-10-CM

## 2024-06-12 DIAGNOSIS — F41.9 ANXIETY: ICD-10-CM

## 2024-06-12 DIAGNOSIS — Z11.1 SCREENING-PULMONARY TB: ICD-10-CM

## 2024-06-12 DIAGNOSIS — E55.9 VITAMIN D DEFICIENCY: ICD-10-CM

## 2024-06-12 DIAGNOSIS — E66.09 CLASS 1 OBESITY DUE TO EXCESS CALORIES WITHOUT SERIOUS COMORBIDITY WITH BODY MASS INDEX (BMI) OF 31.0 TO 31.9 IN ADULT: ICD-10-CM

## 2024-06-12 DIAGNOSIS — Z59.89 INSURANCE COVERAGE PROBLEMS: ICD-10-CM

## 2024-06-12 DIAGNOSIS — Z00.00 ANNUAL PHYSICAL EXAM: Primary | ICD-10-CM

## 2024-06-12 DIAGNOSIS — Z13.220 SCREENING FOR LIPID DISORDERS: ICD-10-CM

## 2024-06-12 PROBLEM — J40 BRONCHITIS: Status: RESOLVED | Noted: 2024-04-29 | Resolved: 2024-06-12

## 2024-06-12 LAB
BASOPHILS # BLD AUTO: 0.05 THOUSANDS/ÂΜL (ref 0–0.1)
BASOPHILS NFR BLD AUTO: 1 % (ref 0–1)
EOSINOPHIL # BLD AUTO: 0.24 THOUSAND/ÂΜL (ref 0–0.61)
EOSINOPHIL NFR BLD AUTO: 2 % (ref 0–6)
ERYTHROCYTE [DISTWIDTH] IN BLOOD BY AUTOMATED COUNT: 13.5 % (ref 11.6–15.1)
HCT VFR BLD AUTO: 35.7 % (ref 34.8–46.1)
HGB BLD-MCNC: 11.9 G/DL (ref 11.5–15.4)
IGA SERPL-MCNC: 356 MG/DL (ref 66–433)
IMM GRANULOCYTES # BLD AUTO: 0.07 THOUSAND/UL (ref 0–0.2)
IMM GRANULOCYTES NFR BLD AUTO: 1 % (ref 0–2)
LYMPHOCYTES # BLD AUTO: 3.08 THOUSANDS/ÂΜL (ref 0.6–4.47)
LYMPHOCYTES NFR BLD AUTO: 31 % (ref 14–44)
MCH RBC QN AUTO: 27.6 PG (ref 26.8–34.3)
MCHC RBC AUTO-ENTMCNC: 33.3 G/DL (ref 31.4–37.4)
MCV RBC AUTO: 83 FL (ref 82–98)
MONOCYTES # BLD AUTO: 0.75 THOUSAND/ÂΜL (ref 0.17–1.22)
MONOCYTES NFR BLD AUTO: 7 % (ref 4–12)
NEUTROPHILS # BLD AUTO: 5.89 THOUSANDS/ÂΜL (ref 1.85–7.62)
NEUTS SEG NFR BLD AUTO: 58 % (ref 43–75)
NRBC BLD AUTO-RTO: 0 /100 WBCS
PLATELET # BLD AUTO: 302 THOUSANDS/UL (ref 149–390)
PMV BLD AUTO: 10.3 FL (ref 8.9–12.7)
RBC # BLD AUTO: 4.31 MILLION/UL (ref 3.81–5.12)
WBC # BLD AUTO: 10.08 THOUSAND/UL (ref 4.31–10.16)

## 2024-06-12 PROCEDURE — 85025 COMPLETE CBC W/AUTO DIFF WBC: CPT

## 2024-06-12 PROCEDURE — 82785 ASSAY OF IGE: CPT

## 2024-06-12 PROCEDURE — 86364 TISS TRNSGLTMNASE EA IG CLAS: CPT

## 2024-06-12 PROCEDURE — 86003 ALLG SPEC IGE CRUDE XTRC EA: CPT

## 2024-06-12 PROCEDURE — 99214 OFFICE O/P EST MOD 30 MIN: CPT | Performed by: NURSE PRACTITIONER

## 2024-06-12 PROCEDURE — 82784 ASSAY IGA/IGD/IGG/IGM EACH: CPT

## 2024-06-12 PROCEDURE — 99395 PREV VISIT EST AGE 18-39: CPT | Performed by: NURSE PRACTITIONER

## 2024-06-12 PROCEDURE — 36415 COLL VENOUS BLD VENIPUNCTURE: CPT

## 2024-06-12 PROCEDURE — 86480 TB TEST CELL IMMUN MEASURE: CPT

## 2024-06-12 PROCEDURE — 86258 DGP ANTIBODY EACH IG CLASS: CPT

## 2024-06-12 RX ORDER — DULOXETIN HYDROCHLORIDE 20 MG/1
20 CAPSULE, DELAYED RELEASE ORAL DAILY
Qty: 30 CAPSULE | Refills: 1 | Status: SHIPPED | OUTPATIENT
Start: 2024-06-12

## 2024-06-12 SDOH — ECONOMIC STABILITY - INCOME SECURITY: OTHER PROBLEMS RELATED TO HOUSING AND ECONOMIC CIRCUMSTANCES: Z59.89

## 2024-06-12 NOTE — ASSESSMENT & PLAN NOTE
Patient will be trialed on Cymbalta 20 mg daily for treatment of her depression.  I did advise patient that it can take 2 to 4 weeks to see true effect from the medication.  I will have patient follow-up in 1 month to reassess her symptoms.

## 2024-06-12 NOTE — ASSESSMENT & PLAN NOTE
Repeat CMP, hemoglobin A1c, and UA were all ordered to be completed prior to the patient's next office visit to assess for any signs of diabetes.  Patient was advised to continue to limit sugar and carbohydrates in her diet.

## 2024-06-12 NOTE — PROGRESS NOTES
"Ambulatory Visit  Name: María Gusman      : 1995      MRN: [id number]  Encounter Provider: BING Higgins  Encounter Date: 2024   Encounter department: [facility]    Assessment & Plan   {There are no diagnoses linked to this encounter. (Refresh or delete this SmartLink)}     History of Present Illness   {Disappearing Hyperlinks I Encounters * My Last Note * Since Last Visit * History :23760}  HPI    Review of Systems  {Select to Display PMH (Optional):55436}  Objective   {Disappearing Hyperlinks   Review Vitals * Enter New Vitals * Results Review * Labs * Imaging * Cardiology * Procedures * Lung Cancer Screening :78255}  /80 (BP Location: Right arm, Patient Position: Sitting, Cuff Size: Standard)   Pulse 100   Ht 4' 11\" (1.499 m)   Wt 71.2 kg (157 lb)   SpO2 99%   BMI 31.71 kg/m²     Physical Exam  Administrative Statements {Disappearing Hyperlinks I  Level of Service * PCMH/PCSP:74444}  {Time Spent Statement (Optional):35689}  "

## 2024-06-12 NOTE — PROGRESS NOTES
Adult Annual Physical  Name: María Gusman      : 1995      MRN: [id number]  Encounter Provider: [facility]  Encounter Date: 2024   Encounter department: [facility] PRIMARY CARE    Assessment & Plan   1. Annual physical exam  2. Vitamin D deficiency  Assessment & Plan:  Repeat vitamin D level was ordered to be completed prior to the patient's next office visit.  Orders:  -     Vitamin D 25 hydroxy; Future  3. Hyperglycemia  Assessment & Plan:  Repeat CMP, hemoglobin A1c, and UA were all ordered to be completed prior to the patient's next office visit to assess for any signs of diabetes.  Patient was advised to continue to limit sugar and carbohydrates in her diet.  Orders:  -     Comprehensive metabolic panel; Future  -     Hemoglobin A1C; Future  -     UA w Reflex to Microscopic w Reflex to Culture; Future  4. Iron deficiency  Assessment & Plan:  Repeat iron panel was ordered to be completed prior to the patient's next office visit.  Orders:  -     Iron Panel (Includes Ferritin, Iron Sat%, Iron, and TIBC); Future  5. Screening for lipid disorders  -     Lipid panel; Future  6. Depression, unspecified depression type  Assessment & Plan:  Patient will be trialed on Cymbalta 20 mg daily for treatment of her depression.  I did advise patient that it can take 2 to 4 weeks to see true effect from the medication.  I will have patient follow-up in 1 month to reassess her symptoms.  Orders:  -     DULoxetine (CYMBALTA) 20 mg capsule; Take 1 capsule (20 mg total) by mouth daily  7. Anxiety  Assessment & Plan:  Well-controlled on current regimen.    Orders:  -     DULoxetine (CYMBALTA) 20 mg capsule; Take 1 capsule (20 mg total) by mouth daily  8. Insurance coverage problems  -     Ambulatory Referral to Social Work Care Management Program; Future  9. Class 1 obesity due to excess calories without serious comorbidity with body mass index (BMI) of 31.0 to 31.9 in adult  Assessment &  Plan:  Patient was advised to begin Zepbound as directed to help with weight loss.  Immunizations and preventive care screenings were discussed with patient today. Appropriate education was printed on patient's after visit summary.    Counseling:  Alcohol/drug use: discussed moderation in alcohol intake, the recommendations for healthy alcohol use, and avoidance of illicit drug use.  Dental Health: discussed importance of regular tooth brushing, flossing, and dental visits.  Injury prevention: discussed safety/seat belts, safety helmets, smoke detectors, carbon dioxide detectors, and smoking near bedding or upholstery.  Sexual health: discussed sexually transmitted diseases, partner selection, use of condoms, avoidance of unintended pregnancy, and contraceptive alternatives.  Exercise: the importance of regular exercise/physical activity was discussed. Recommend exercise 3-5 times per week for at least 30 minutes.     Depression: Patient's PHQ score was positive in the office today indicating mild depression.  The patient does report that she has been noting increased depression symptoms recently as well as increased fatigue.  The patient reports that she was trialed on Wellbutrin in the past but this did not improve her symptoms much.    Anxiety: Well-controlled on current dosage of propranolol.    Vitamin D deficiency: Patient's most recent vitamin D level was low.  Patient is currently taking a daily vitamin D supplement.    Iron deficiency: Patient's most recent iron panel was normal.  Patient is currently taking a daily iron supplement.    Hyperglycemia: Patient's most recent hemoglobin A1c was 5.8 putting her in the prediabetes range.  Patient denies polydipsia, polyphagia, or polyuria.    Obesity: Patient was previously prescribed Zepbound to promote weight loss however, she has not started to take the medication yet.  She reports that she will begin the medication in the near future.  Depression Screening  and Follow-up Plan: Patient's depression screening was positive with a PHQ-9 score of 5. Patient assessed for underlying major depression. Brief counseling provided and recommend additional follow-up/re-evaluation next office visit.         History of Present Illness   {Disappearing Hyperlinks I Encounters * My Last Note * Since Last Visit * History :[id number]}  Adult Annual Physical:  Patient presents for annual physical.     Diet and Physical Activity:  - Diet/Nutrition: consuming 3-5 servings of fruits/vegetables daily, high fat diet and well balanced diet.  - Exercise: walking, 5-7 times a week on average and 1-2 hours on average.    Depression Screening:    - PHQ-9 Score: 5    General Health:  - Sleep: 7-8 hours of sleep on average and sleeps well.  - Hearing: normal hearing bilateral ears.  - Vision: wears glasses, wears contacts, goes for regular eye exams and most recent eye exam < 1 year ago.  - Dental: brushes teeth twice daily.    /GYN Health:  - Follows with GYN: yes.   - Menopause: premenopausal.   - History of STDs: yes  - Contraception:. None      Advanced Care Planning:  - Has an advanced directive?: no    - Has a durable medical POA?: no    - ACP document given to patient?: no      Review of Systems   Constitutional:  Positive for fatigue. Negative for chills and fever.   HENT:  Negative for ear pain and sore throat.    Eyes:  Negative for pain and visual disturbance.   Respiratory:  Negative for cough, chest tightness, shortness of breath and wheezing.    Cardiovascular:  Negative for chest pain, palpitations and leg swelling.   Gastrointestinal:  Negative for abdominal pain, constipation, diarrhea, nausea and vomiting.   Endocrine: Negative for cold intolerance and heat intolerance.   Genitourinary:  Negative for decreased urine volume, dysuria and hematuria.   Musculoskeletal:  Negative for arthralgias, back pain and myalgias.   Skin:  Negative for color change and rash.   Allergic/Immunologic:  "Negative for environmental allergies.   Neurological:  Negative for dizziness, seizures, syncope, weakness, light-headedness, numbness and headaches.   Hematological:  Negative for adenopathy.   Psychiatric/Behavioral:  Positive for dysphoric mood. Negative for confusion, self-injury, sleep disturbance and suicidal ideas. The patient is not nervous/anxious.    All other systems reviewed and are negative.        Objective   {Disappearing Hyperlinks   Review Vitals * Enter New Vitals * Results Review * Labs * Imaging * Cardiology * Procedures * Lung Cancer Screening :20723}  /80 (BP Location: Right arm, Patient Position: Sitting, Cuff Size: Standard)   Pulse 100   Ht 4' 11\" (1.499 m)   Wt 71.2 kg (157 lb)   SpO2 99%   BMI 31.71 kg/m²     Physical Exam  Vitals and nursing note reviewed.   Constitutional:       General: She is not in acute distress.     Appearance: Normal appearance. She is well-developed. She is not ill-appearing.   HENT:      Head: Normocephalic.   Eyes:      Conjunctiva/sclera: Conjunctivae normal.   Cardiovascular:      Rate and Rhythm: Normal rate and regular rhythm.      Pulses: Normal pulses.           Carotid pulses are 2+ on the right side and 2+ on the left side.       Radial pulses are 2+ on the right side and 2+ on the left side.        Posterior tibial pulses are 2+ on the right side and 2+ on the left side.      Heart sounds: Normal heart sounds. No murmur heard.  Pulmonary:      Effort: Pulmonary effort is normal. No respiratory distress.      Breath sounds: Normal breath sounds. No decreased breath sounds, wheezing, rhonchi or rales.   Abdominal:      General: Abdomen is flat. Bowel sounds are normal. There is no distension.      Palpations: Abdomen is soft.      Tenderness: There is no abdominal tenderness. There is no guarding.   Musculoskeletal:         General: No swelling. Normal range of motion.      Cervical back: Normal range of motion and neck supple.      Right " lower leg: No edema.      Left lower leg: No edema.   Skin:     General: Skin is warm and dry.      Capillary Refill: Capillary refill takes less than 2 seconds.   Neurological:      General: No focal deficit present.      Mental Status: She is alert and oriented to person, place, and time.   Psychiatric:         Mood and Affect: Mood normal.         Behavior: Behavior normal.         Thought Content: Thought content normal.         Judgment: Judgment normal.       Administrative Statements {Disappearing Hyperlinks I  [facility] of Service * [id number]}

## 2024-06-13 ENCOUNTER — TELEPHONE (OUTPATIENT)
Dept: FAMILY MEDICINE CLINIC | Facility: CLINIC | Age: 29
End: 2024-06-13

## 2024-06-13 ENCOUNTER — PATIENT OUTREACH (OUTPATIENT)
Dept: FAMILY MEDICINE CLINIC | Facility: CLINIC | Age: 29
End: 2024-06-13

## 2024-06-13 LAB
GAMMA INTERFERON BACKGROUND BLD IA-ACNC: 0.02 IU/ML
GLIADIN PEPTIDE IGA SER-ACNC: 1.6 U/ML
GLIADIN PEPTIDE IGA SER-ACNC: NEGATIVE
GLIADIN PEPTIDE IGG SER-ACNC: 0.8 U/ML
GLIADIN PEPTIDE IGG SER-ACNC: NEGATIVE
M TB IFN-G BLD-IMP: NEGATIVE
M TB IFN-G CD4+ BCKGRND COR BLD-ACNC: 0.08 IU/ML
M TB IFN-G CD4+ BCKGRND COR BLD-ACNC: 0.1 IU/ML
MITOGEN IGNF BCKGRD COR BLD-ACNC: 9.98 IU/ML
TTG IGA SER-ACNC: <0.5 U/ML
TTG IGA SER-ACNC: NEGATIVE
TTG IGG SER-ACNC: <0.8 U/ML
TTG IGG SER-ACNC: NEGATIVE

## 2024-06-13 NOTE — PROGRESS NOTES
OP CM called to pt in regards to consult for insurance issues.  Pt told PCP that she will be losing her insurance at the ends of this month.  Email sent to pt as well.

## 2024-06-13 NOTE — TELEPHONE ENCOUNTER
Please make patient aware that her tuberculosis form was completed and can be picked up at the  at her convenience.

## 2024-06-17 ENCOUNTER — HOSPITAL ENCOUNTER (EMERGENCY)
Facility: HOSPITAL | Age: 29
Discharge: HOME | End: 2024-06-17
Attending: EMERGENCY MEDICINE | Admitting: EMERGENCY MEDICINE
Payer: COMMERCIAL

## 2024-06-17 VITALS
TEMPERATURE: 98.9 F | WEIGHT: 176.81 LBS | BODY MASS INDEX: 35.71 KG/M2 | HEART RATE: 88 BPM | SYSTOLIC BLOOD PRESSURE: 115 MMHG | OXYGEN SATURATION: 98 % | RESPIRATION RATE: 20 BRPM | DIASTOLIC BLOOD PRESSURE: 63 MMHG

## 2024-06-17 DIAGNOSIS — J02.9 SORE THROAT: ICD-10-CM

## 2024-06-17 DIAGNOSIS — J02.0 STREP PHARYNGITIS: ICD-10-CM

## 2024-06-17 DIAGNOSIS — B34.9 ACUTE VIRAL SYNDROME: Primary | ICD-10-CM

## 2024-06-17 LAB
ALMOND IGE QN: <0.1 KUA/I
CASHEW NUT IGE QN: <0.1 KUA/I
CODFISH IGE QN: <0.1 KUA/I
EGG WHITE IGE QN: <0.1 KUA/I
FLUAV RNA RESP QL NAA+PROBE: NEGATIVE
FLUBV RNA RESP QL NAA+PROBE: NEGATIVE
GLUTEN IGE QN: <0.1 KUA/I
HAZELNUT IGE QN: <0.1 KUA/L
MILK IGE QN: <0.1 KUA/I
PEANUT IGE QN: <0.1 KUA/I
RSV RNA RESP QL NAA+PROBE: NEGATIVE
S PYO DNA THROAT QL NAA+PROBE: DETECTED
SALMON IGE QN: <0.1 KUA/I
SARS-COV-2 RNA RESP QL NAA+PROBE: NEGATIVE
SCALLOP IGE QN: <0.1 KUA/L
SESAME SEED IGE QN: <0.1 KUA/I
SHRIMP IGE QN: <0.1 KUA/L
SOYBEAN IGE QN: <0.1 KUA/I
TOTAL IGE SMQN RAST: 51.4 KU/L (ref 0–113)
TUNA IGE QN: <0.1 KUA/I
WALNUT IGE QN: <0.1 KUA/I
WHEAT IGE QN: <0.1 KUA/I

## 2024-06-17 PROCEDURE — 99284 EMERGENCY DEPT VISIT MOD MDM: CPT

## 2024-06-17 PROCEDURE — 0241U HB NFCT DS VIR RESP RNA 4 TRGT: CPT

## 2024-06-17 PROCEDURE — 99283 EMERGENCY DEPT VISIT LOW MDM: CPT

## 2024-06-17 PROCEDURE — 87651 STREP A DNA AMP PROBE: CPT

## 2024-06-17 RX ORDER — AMOXICILLIN 500 MG/1
500 CAPSULE ORAL EVERY 12 HOURS SCHEDULED
Qty: 20 CAPSULE | Refills: 0 | Status: SHIPPED | OUTPATIENT
Start: 2024-06-17 | End: 2024-06-27

## 2024-06-17 RX ORDER — ACETAMINOPHEN 325 MG/1
975 TABLET ORAL ONCE
Status: COMPLETED | OUTPATIENT
Start: 2024-06-17 | End: 2024-06-17

## 2024-06-17 RX ADMIN — ACETAMINOPHEN 975 MG: 325 TABLET, FILM COATED ORAL at 12:29

## 2024-06-17 NOTE — DISCHARGE INSTRUCTIONS
May continue Mucinex as needed.  If you have any positive test results from today we will give you a call.  Please follow-up with your primary care provider for further evaluation and management as needed.

## 2024-06-17 NOTE — ED PROVIDER NOTES
History  Chief Complaint   Patient presents with    Flu Symptoms     Pt reports flu like symptoms that began this morning, took mucinex severe cold and flu.      This is a 29-year-old female presents to the ED for evaluation of flulike symptoms X 1 day.  Patient reports sore throat, runny nose, new onset cough, mild headache, diffuse body aches.  Does report fever at home with a Tmax of 101F, states she did take Mucinex earlier this morning with some improvement.  She denies any recent travel, states no one in the house has been sick with similar symptoms.  She denies any vision changes, neck pain, chest pain, SOB, abdominal pain, NVD, dysuria.        Prior to Admission Medications   Prescriptions Last Dose Informant Patient Reported? Taking?   DULoxetine (CYMBALTA) 20 mg capsule   No No   Sig: Take 1 capsule (20 mg total) by mouth daily   albuterol (Proventil HFA) 90 mcg/act inhaler   No No   Sig: Inhale 2 puffs every 6 (six) hours as needed for wheezing   ferrous sulfate 324 (65 Fe) mg   No No   Sig: Take 1 tablet (324 mg total) by mouth daily before breakfast   ketorolac (TORADOL) 10 mg tablet   No No   Sig: Take 1 tablet (10 mg total) by mouth every 6 (six) hours as needed (Migraines)   ondansetron (Zofran ODT) 4 mg disintegrating tablet   No No   Sig: Take 1 tablet (4 mg total) by mouth every 6 (six) hours as needed for nausea or vomiting   propranolol (INDERAL) 40 mg tablet   No No   Sig: Take 1 tablet (40 mg total) by mouth 2 (two) times a day      Facility-Administered Medications: None       Past Medical History:   Diagnosis Date    Abnormal Pap smear of cervix 2015 2018 pap negative;    Anxiety     Asthma     Bronchitis 04/29/2024    Chlamydia 2014    Costochondritis 06/07/2023    Depression     Fatty liver     GERD (gastroesophageal reflux disease)     Gestational diabetes     Leukocytosis 01/25/2024    Migraine     will be seeing an neurologist    Otitis media     Pre-diabetes     Visual impairment      Yeast infection 2023       Past Surgical History:   Procedure Laterality Date     SECTION      induced due to GDM, ? ruptured placenta w/ emergency C/S    LYMPHADENECTOMY         Family History   Problem Relation Age of Onset    Cancer Mother 54        Multiple Myeloma    Diabetes Maternal Grandmother     Breast cancer Other     Colon cancer Neg Hx     Ovarian cancer Neg Hx     Uterine cancer Neg Hx      I have reviewed and agree with the history as documented.    E-Cigarette/Vaping    E-Cigarette Use Never User      E-Cigarette/Vaping Substances    Nicotine No     THC No     CBD No     Flavoring No     Other No     Unknown No      Social History     Tobacco Use    Smoking status: Never    Smokeless tobacco: Never   Vaping Use    Vaping status: Never Used   Substance Use Topics    Alcohol use: Not Currently    Drug use: Never       Review of Systems   Constitutional:  Positive for chills and fever.   HENT:  Positive for rhinorrhea and sore throat. Negative for trouble swallowing and voice change.    Eyes:  Negative for photophobia and visual disturbance.   Respiratory:  Positive for cough. Negative for shortness of breath.    Cardiovascular:  Negative for chest pain and palpitations.   Gastrointestinal:  Negative for abdominal pain, diarrhea, nausea and vomiting.   Genitourinary:  Negative for difficulty urinating, dysuria, flank pain and hematuria.   Musculoskeletal:  Positive for arthralgias (Diffuse body aches).   Skin:  Negative for rash.   Neurological:  Positive for headaches. Negative for dizziness, syncope, weakness and light-headedness.       Physical Exam  Physical Exam  Vitals and nursing note reviewed.   Constitutional:       General: She is not in acute distress.     Appearance: She is well-developed. She is not toxic-appearing or diaphoretic.      Comments: Well-appearing patient exam.  No signs of acute distress during initial ED evaluation.   HENT:      Head: Normocephalic and  atraumatic.      Mouth/Throat:      Tonsils: No tonsillar exudate. 1+ on the right. 1+ on the left.      Comments: Mild enlargement bilaterally of tonsils, no signs of tonsillar exudate.  Uvula sits midline, no submental or submandibular swelling.  No signs of hoarseness in patient's voice or stridor in her upper airway.  No signs of acute airway swelling or compromise.  Eyes:      Conjunctiva/sclera: Conjunctivae normal.   Cardiovascular:      Rate and Rhythm: Normal rate and regular rhythm.      Heart sounds: No murmur heard.  Pulmonary:      Effort: Pulmonary effort is normal. No respiratory distress.      Breath sounds: Normal breath sounds.   Abdominal:      Palpations: Abdomen is soft.      Tenderness: There is no abdominal tenderness.   Musculoskeletal:         General: No swelling.      Cervical back: Normal range of motion and neck supple.   Skin:     General: Skin is warm and dry.      Capillary Refill: Capillary refill takes less than 2 seconds.   Neurological:      General: No focal deficit present.      Mental Status: She is alert and oriented to person, place, and time.      GCS: GCS eye subscore is 4. GCS verbal subscore is 5. GCS motor subscore is 6.      Cranial Nerves: Cranial nerves 2-12 are intact.      Sensory: Sensation is intact.      Motor: Motor function is intact.      Coordination: Coordination is intact.      Gait: Gait normal.   Psychiatric:         Mood and Affect: Mood normal.         Vital Signs  ED Triage Vitals [06/17/24 1141]   Temperature Pulse Respirations Blood Pressure SpO2   98.9 °F (37.2 °C) (!) 118 20 115/63 98 %      Temp Source Heart Rate Source Patient Position - Orthostatic VS BP Location FiO2 (%)   Oral Monitor Sitting Right arm --      Pain Score       --           Vitals:    06/17/24 1141 06/17/24 1214   BP: 115/63    Pulse: (!) 118 88   Patient Position - Orthostatic VS: Sitting          Visual Acuity      ED Medications  Medications   acetaminophen (TYLENOL) tablet  975 mg (975 mg Oral Given 6/17/24 1229)       Diagnostic Studies  Results Reviewed       Procedure Component Value Units Date/Time    FLU/RSV/COVID - if FLU/RSV clinically relevant [[id number]]  (Normal) Collected: 06/17/24 1220    Lab Status: Final result Specimen: Nares from Nose Updated: 06/17/24 1322     SARS-CoV-2 Negative     INFLUENZA A PCR Negative     INFLUENZA B PCR Negative     RSV PCR Negative    Narrative:      FOR PEDIATRIC PATIENTS - copy/paste COVID Guidelines URL to browser: [email or web address]    SARS-CoV-2 assay is a Nucleic Acid Amplification assay intended for the  qualitative detection of nucleic acid from SARS-CoV-2 in nasopharyngeal  swabs. Results are for the presumptive identification of SARS-CoV-2 RNA.    Positive results are indicative of infection with SARS-CoV-2, the virus  causing COVID-19, but do not rule out bacterial infection or co-infection  with other viruses. Laboratories within the [place] and its  territories are required to report all positive results to the appropriate  public health authorities. Negative results do not preclude SARS-CoV-2  infection and should not be used as the sole basis for treatment or other  patient management decisions. Negative results must be combined with  clinical observations, patient history, and epidemiological information.  This test has not been FDA cleared or approved.    This test has been authorized by FDA under an Emergency Use Authorization  (EUA). This test is only authorized for the duration of time the  declaration that circumstances exist justifying the authorization of the  emergency use of an in vitro diagnostic tests for detection of SARS-CoV-2  virus and/or diagnosis of COVID-19 infection under section 564(b)(1) of  the Act, 21 U.S.C. 360bbb-3(b)(1), unless the authorization is terminated  or revoked sooner. The test has been validated but independent review by FDA  and  CLIA is pending.    Test performed using [employer] GeneXpert: This RT-PCR assay targets N2,  a region unique to SARS-CoV-2. A conserved region in the E-gene was chosen  for pan-Sarbecovirus detection which includes SARS-CoV-2.    According to CMS-2020-01-R, this platform meets the definition of high-throughput technology.    Strep A PCR [[id number]]  (Abnormal) Collected: 06/17/24 1220    Lab Status: Final result Specimen: Throat Updated: 06/17/24 1307     STREP A PCR Detected                   No orders to display              Procedures  Procedures         ED Course                               SBIRT 22yo+      Flowsheet Row Most Recent Value   Initial Alcohol Screen: US AUDIT-C     1. How often do you have a drink containing alcohol? 0 Filed at: 06/17/2024 1154   2. How many drinks containing alcohol do you have on a typical day you are drinking?  0 Filed at: 06/17/2024 1154   3a. Male UNDER 65: How often do you have five or more drinks on one occasion? 0 Filed at: 06/17/2024 1154   3b. FEMALE Any Age, or MALE 65+: How often do you have 4 or more drinks on one occassion? 0 Filed at: 06/17/2024 1154   Audit-C Score 0 Filed at: 06/17/2024 1154   ERNIE: How many times in the past year have you...    Used an illegal drug or used a prescription medication for non-medical reasons? Never Filed at: 06/17/2024 1154                      Medical Decision Making  29-year-old female presents ED for evaluation of flulike symptoms that began yesterday.  Patient afebrile in ED, no signs of acute distress on exam.  Denies any recent travel.  Patient advised PCP follow-up for further evaluation and management with ED return precautions discussed.  Patient verbalized understanding and agreement with plan.  At time of discharge strep a PCR and COVID/flu/RSV pending.    Amount and/or Complexity of Data Reviewed  Labs: ordered.    Risk  OTC drugs.             Disposition  Final diagnoses:   Acute viral syndrome   Sore throat   Strep  pharyngitis     Time reflects when diagnosis was documented in both MDM as applicable and the Disposition within this note       Time User Action Codes Description Comment    6/17/2024 12:18 PM Samm Ramsey Add [B34.9] Acute viral syndrome     6/17/2024 12:18 PM Samm Ramsey Add [J02.9] Sore throat     6/17/2024  1:11 PM Samm Ramsey Add [J02.0] Strep pharyngitis           ED Disposition       ED Disposition   Discharge    Condition   Stable    Date/Time   Mon Jun 17, 2024 1218    Comment   María Gusman discharge to home/self care.                   Follow-up Information       Follow up With Specialties Details Why Contact Info Additional Information    [facility] Schedule an appointment as soon as possible for a visit  For re-check [id number] [address] [id number] [phone]       [facility] Emergency Department Emergency Medicine Go to  If symptoms worsen [address] [place] [id number] [phone] [facility], [address], [place], [address]            Discharge Medication List as of 6/17/2024 12:23 PM        CONTINUE these medications which have NOT CHANGED    Details   albuterol (Proventil HFA) 90 mcg/act inhaler Inhale 2 puffs every 6 (six) hours as needed for wheezing, Starting Tue 5/14/2024, Normal      DULoxetine (CYMBALTA) 20 mg capsule Take 1 capsule (20 mg total) by mouth daily, Starting Wed 6/12/2024, Normal      ferrous sulfate 324 (65 Fe) mg Take 1 tablet (324 mg total) by mouth daily before breakfast, Starting Tue 5/14/2024, Normal      ketorolac (TORADOL) 10 mg tablet Take 1 tablet (10 mg total) by mouth every 6 (six) hours as needed (Migraines), Starting Tue 5/14/2024, Normal      ondansetron (Zofran ODT) 4 mg disintegrating tablet Take 1 tablet (4 mg total) by mouth every 6 (six) hours as needed for nausea or vomiting, Starting Tue 5/14/2024, Normal       propranolol (INDERAL) 40 mg tablet Take 1 tablet (40 mg total) by mouth 2 (two) times a day, Starting Tue 5/14/2024, Normal             No discharge procedures on file.    PDMP Review         Value Time User    PDMP Reviewed  Yes 3/25/2024  7:46 PM B[facility] Higgins            ED Provider  Electronically Signed by             Samm Ramsey PA-C  06/17/24 4935

## 2024-06-17 NOTE — Clinical Note
María Gusman was seen and treated in our emergency department on 6/17/2024.                Diagnosis:     María  .    She may return on this date: 06/19/2024         If you have any questions or concerns, please don't hesitate to call.      Samm Ramsey PA-C    ______________________________           _______________          _______________  Hospital Representative                              Date                                Time

## 2024-06-18 ENCOUNTER — PATIENT OUTREACH (OUTPATIENT)
Dept: FAMILY MEDICINE CLINIC | Facility: CLINIC | Age: 29
End: 2024-06-18

## 2024-06-18 LAB

## 2024-06-18 NOTE — PROGRESS NOTES
OP CM called to pt again in regards to pt losing her insurance at the end of this month.  Email sent to pt as well.

## 2024-06-22 ENCOUNTER — PATIENT OUTREACH (OUTPATIENT)
Dept: FAMILY MEDICINE CLINIC | Facility: CLINIC | Age: 29
End: 2024-06-22

## 2024-06-22 NOTE — PROGRESS NOTES
OP MARIELLA vd email back from pt that she is losing her medicaid at the end of this month even though her income is the same.  Email sent back to pt:    Hello again,     You would need to speak to a  at the Medicaid office about that and see if you can appeal that decision.  If you have a bill of more than $200 with [employer] you can apply for merary care by calling our financial counselors at [phone].  Merary care is a program through [employer] based on your income that helps cover the cost of your medical bills.  You can also apply at [email or web address] for private insurance or see if you can get insurance through your employer.      OP MARIELLA will remain available.

## 2024-06-26 DIAGNOSIS — F41.9 ANXIETY: ICD-10-CM

## 2024-06-26 DIAGNOSIS — F32.A DEPRESSION, UNSPECIFIED DEPRESSION TYPE: ICD-10-CM

## 2024-06-26 RX ORDER — DULOXETIN HYDROCHLORIDE 20 MG/1
20 CAPSULE, DELAYED RELEASE ORAL DAILY
Qty: 90 CAPSULE | Refills: 1 | Status: SHIPPED | OUTPATIENT
Start: 2024-06-26

## 2024-07-09 ENCOUNTER — PATIENT MESSAGE (OUTPATIENT)
Dept: FAMILY MEDICINE CLINIC | Facility: CLINIC | Age: 29
End: 2024-07-09

## 2024-07-15 ENCOUNTER — HOSPITAL ENCOUNTER (EMERGENCY)
Facility: HOSPITAL | Age: 29
Discharge: HOME | End: 2024-07-15
Attending: EMERGENCY MEDICINE

## 2024-07-15 VITALS
TEMPERATURE: 99.1 F | BODY MASS INDEX: 30.99 KG/M2 | DIASTOLIC BLOOD PRESSURE: 70 MMHG | SYSTOLIC BLOOD PRESSURE: 121 MMHG | HEART RATE: 100 BPM | WEIGHT: 153.44 LBS | RESPIRATION RATE: 18 BRPM | OXYGEN SATURATION: 100 %

## 2024-07-15 DIAGNOSIS — L55.0 SUNBURN OF FIRST DEGREE: Primary | ICD-10-CM

## 2024-07-15 PROCEDURE — 99284 EMERGENCY DEPT VISIT MOD MDM: CPT

## 2024-07-15 PROCEDURE — 96372 THER/PROPH/DIAG INJ SC/IM: CPT

## 2024-07-15 PROCEDURE — 99283 EMERGENCY DEPT VISIT LOW MDM: CPT

## 2024-07-15 RX ORDER — ACETAMINOPHEN 325 MG/1
975 TABLET ORAL ONCE
Status: COMPLETED | OUTPATIENT
Start: 2024-07-15 | End: 2024-07-15

## 2024-07-15 RX ORDER — KETOROLAC TROMETHAMINE 30 MG/ML
15 INJECTION, SOLUTION INTRAMUSCULAR; INTRAVENOUS ONCE
Status: COMPLETED | OUTPATIENT
Start: 2024-07-15 | End: 2024-07-15

## 2024-07-15 RX ADMIN — KETOROLAC TROMETHAMINE 15 MG: 30 INJECTION, SOLUTION INTRAMUSCULAR; INTRAVENOUS at 11:26

## 2024-07-15 RX ADMIN — ACETAMINOPHEN 975 MG: 325 TABLET, FILM COATED ORAL at 11:26

## 2024-07-15 NOTE — ED PROVIDER NOTES
"History  Chief Complaint   Patient presents with    Sunburn     Pt was at [facility] x3 days ago, got a bad generalized sunburn. Reports, \"I feel like I'm on a fire.\"      This is a 29-year-old female who presents to the ED for evaluation of sunburn.  Patient reports approximately 3 days prior she started a [employer] family, states she did not apply sunscreen.  She reports having sunburn on her upper back, face, upper extremities since that time.  States has been intermittently applying aloe vera at home with only minimal improvement of her symptoms.  States he has not taken any Tylenol or ibuprofen at home for pain.  Denies any blistering or bubbling of her skin, denies any blood-filled lesions, denies any sloughing of skin.  She denies any other acute concerns or complaints at this time including recent fevers, vision changes, headaches, chest pain, SOB, abdominal pain, NVD, dysuria.        Prior to Admission Medications   Prescriptions Last Dose Informant Patient Reported? Taking?   DULoxetine (CYMBALTA) 20 mg capsule   No No   Sig: TAKE 1 CAPSULE BY MOUTH EVERY DAY   albuterol (Proventil HFA) 90 mcg/act inhaler   No No   Sig: Inhale 2 puffs every 6 (six) hours as needed for wheezing   ferrous sulfate 324 (65 Fe) mg   No No   Sig: Take 1 tablet (324 mg total) by mouth daily before breakfast   ketorolac (TORADOL) 10 mg tablet   No No   Sig: Take 1 tablet (10 mg total) by mouth every 6 (six) hours as needed (Migraines)   ondansetron (Zofran ODT) 4 mg disintegrating tablet   No No   Sig: Take 1 tablet (4 mg total) by mouth every 6 (six) hours as needed for nausea or vomiting   propranolol (INDERAL) 40 mg tablet   No No   Sig: Take 1 tablet (40 mg total) by mouth 2 (two) times a day      Facility-Administered Medications: None       Past Medical History:   Diagnosis Date    Abnormal Pap smear of cervix 2015 2018 pap negative;    Anxiety     Asthma     Bronchitis 04/29/2024    Chlamydia 2014    Costochondritis " 2023    Depression     Fatty liver     GERD (gastroesophageal reflux disease)     Gestational diabetes     Leukocytosis 2024    Migraine     will be seeing an neurologist    Otitis media     Pre-diabetes     Visual impairment     Yeast infection 2023       Past Surgical History:   Procedure Laterality Date     SECTION      induced due to GDM, ? ruptured placenta w/ emergency C/S    LYMPHADENECTOMY         Family History   Problem Relation Age of Onset    Cancer Mother 54        Multiple Myeloma    Diabetes Maternal Grandmother     Breast cancer Other     Colon cancer Neg Hx     Ovarian cancer Neg Hx     Uterine cancer Neg Hx      I have reviewed and agree with the history as documented.    E-Cigarette/Vaping    E-Cigarette Use Never User      E-Cigarette/Vaping Substances    Nicotine No     THC No     CBD No     Flavoring No     Other No     Unknown No      Social History     Tobacco Use    Smoking status: Never    Smokeless tobacco: Never   Vaping Use    Vaping status: Never Used   Substance Use Topics    Alcohol use: Not Currently    Drug use: Never       Review of Systems   Constitutional:  Negative for chills and fever.   HENT: Negative.     Eyes:  Negative for photophobia and visual disturbance.   Respiratory:  Negative for cough and shortness of breath.    Cardiovascular:  Negative for chest pain and palpitations.   Gastrointestinal:  Negative for abdominal pain, diarrhea, nausea and vomiting.   Genitourinary:  Negative for difficulty urinating and dysuria.   Musculoskeletal:  Negative for neck pain and neck stiffness.   Skin:  Positive for rash.   Neurological:  Negative for dizziness, syncope, light-headedness and headaches.       Physical Exam  Physical Exam  Vitals and nursing note reviewed.   Constitutional:       General: She is not in acute distress.     Appearance: Normal appearance. She is well-developed. She is not ill-appearing, toxic-appearing or diaphoretic.       Comments: Well-appearing patient exam.  Does not appear to be in any acute distress or significant discomfort at time of ED evaluation.   HENT:      Head: Normocephalic and atraumatic.   Eyes:      Conjunctiva/sclera: Conjunctivae normal.   Cardiovascular:      Rate and Rhythm: Normal rate and regular rhythm.      Heart sounds: No murmur heard.  Pulmonary:      Effort: Pulmonary effort is normal. No respiratory distress.      Breath sounds: Normal breath sounds.   Abdominal:      Palpations: Abdomen is soft.      Tenderness: There is no abdominal tenderness.   Musculoskeletal:         General: No swelling.      Cervical back: Neck supple.   Skin:     General: Skin is warm and dry.      Capillary Refill: Capillary refill takes less than 2 seconds.      Findings: Erythema present.      Comments: Upper back shows diffuse area of erythema, also present on bilateral upper arms and face.  There is no sign of fluid or blood-filled blisters, no signs of skin peeling or sloughing of skin.  Exam does appear consistent with sunburn.   Neurological:      General: No focal deficit present.      Mental Status: She is alert and oriented to person, place, and time.   Psychiatric:         Mood and Affect: Mood normal.         Vital Signs  ED Triage Vitals   Temperature Pulse Respirations Blood Pressure SpO2   07/15/24 1054 07/15/24 1055 07/15/24 1055 07/15/24 1055 07/15/24 1055   99.1 °F (37.3 °C) 100 18 121/70 100 %      Temp Source Heart Rate Source Patient Position - Orthostatic VS BP Location FiO2 (%)   07/15/24 1054 07/15/24 1055 07/15/24 1055 07/15/24 1055 --   Oral Monitor Sitting Right arm       Pain Score       07/15/24 1055       10 - Worst Possible Pain           Vitals:    07/15/24 1055   BP: 121/70   Pulse: 100   Patient Position - Orthostatic VS: Sitting         Visual Acuity      ED Medications  Medications   ketorolac (TORADOL) injection 15 mg (15 mg Intramuscular Given 7/15/24 1126)   acetaminophen (TYLENOL) tablet  975 mg (975 mg Oral Given 7/15/24 1126)       Diagnostic Studies  Results Reviewed       None                   No orders to display              Procedures  Procedures         ED Course                                               Medical Decision Making  29-year-old female presents ED for evaluation of sunburn from 3 days prior.  Patient afebrile with normal vital signs in ED, well-appearing on exam.  No signs of sloughing of skin or blistering.  Denies systemic symptoms.  Given IM Toradol in ED for pain.  Advised to continue aloe vera as well as supportive care at home.  Advised PCP follow-up for further evaluation and management.  ED return precautions discussed with patient.  Patient verbalized understanding and agreement with plan.    Risk  OTC drugs.  Prescription drug management.                 Disposition  Final diagnoses:   Sunburn of first degree     Time reflects when diagnosis was documented in both MDM as applicable and the Disposition within this note       Time User Action Codes Description Comment    7/15/2024 11:22 AM Samm Ramsey Add [L55.0] Sunburn of first degree           ED Disposition       ED Disposition   Discharge    Condition   Stable    Date/Time   Mon Jul 15, 2024 11:22 AM    Comment   María Gusman discharge to home/self care.                   Follow-up Information       Follow up With Specialties Details Why Contact Info Additional Information    [facility] Schedule an appointment as soon as possible for a visit  For re-check [address] [id number] [phone]       [facility] Emergency Department Emergency Medicine Go to  If symptoms worsen [address] [facility] [id number] [phone] [facility] Emergency Department, [address], [place], [address]            Discharge Medication List as of 7/15/2024 11:22 AM        CONTINUE these medications which have  NOT CHANGED    Details   albuterol (Proventil HFA) 90 mcg/act inhaler Inhale 2 puffs every 6 (six) hours as needed for wheezing, Starting Tue 5/14/2024, Normal      DULoxetine (CYMBALTA) 20 mg capsule TAKE 1 CAPSULE BY MOUTH EVERY DAY, Starting Wed 6/26/2024, Normal      ferrous sulfate 324 (65 Fe) mg Take 1 tablet (324 mg total) by mouth daily before breakfast, Starting Tue 5/14/2024, Normal      ketorolac (TORADOL) 10 mg tablet Take 1 tablet (10 mg total) by mouth every 6 (six) hours as needed (Migraines), Starting Tue 5/14/2024, Normal      ondansetron (Zofran ODT) 4 mg disintegrating tablet Take 1 tablet (4 mg total) by mouth every 6 (six) hours as needed for nausea or vomiting, Starting Tue 5/14/2024, Normal      propranolol (INDERAL) 40 mg tablet Take 1 tablet (40 mg total) by mouth 2 (two) times a day, Starting Tue 5/14/2024, Normal             No discharge procedures on file.    PDMP Review         Value Time User    PDMP Reviewed  Yes 3/25/2024  7:46 PM [facility]            ED Provider  Electronically Signed by             Samm Ramsey PA-C  07/15/24 3222

## 2024-07-15 NOTE — DISCHARGE INSTRUCTIONS
May continue Tylenol and ibuprofen at home as needed for pain.  He may continue aloe vera.  Follow-up with your primary care provider for further evaluation and management as needed.

## 2024-07-15 NOTE — Clinical Note
María Gusman was seen and treated in our emergency department on 7/15/2024.                Diagnosis:     María  is off the rest of the shift today.    She may return on this date:          If you have any questions or concerns, please don't hesitate to call.      Samm Ramsey PA-C    ______________________________           _______________          _______________  Hospital Representative                              Date                                Time

## 2024-07-17 PROBLEM — L55.9 SUNBURN: Status: ACTIVE | Noted: 2024-07-17

## 2024-08-08 ENCOUNTER — OFFICE VISIT (OUTPATIENT)
Dept: FAMILY MEDICINE CLINIC | Facility: CLINIC | Age: 29
End: 2024-08-08

## 2024-08-08 VITALS
HEIGHT: 59 IN | OXYGEN SATURATION: 99 % | HEART RATE: 70 BPM | DIASTOLIC BLOOD PRESSURE: 70 MMHG | SYSTOLIC BLOOD PRESSURE: 112 MMHG | WEIGHT: 156.8 LBS | BODY MASS INDEX: 31.61 KG/M2 | TEMPERATURE: 97.5 F

## 2024-08-08 DIAGNOSIS — J45.20 MILD INTERMITTENT ASTHMA WITHOUT COMPLICATION: ICD-10-CM

## 2024-08-08 DIAGNOSIS — R22.1 LUMP ON NECK: Primary | ICD-10-CM

## 2024-08-08 PROBLEM — L55.9 SUNBURN: Status: RESOLVED | Noted: 2024-07-17 | Resolved: 2024-08-08

## 2024-08-08 PROCEDURE — 99214 OFFICE O/P EST MOD 30 MIN: CPT | Performed by: NURSE PRACTITIONER

## 2024-08-08 RX ORDER — ALBUTEROL SULFATE 90 UG/1
2 AEROSOL, METERED RESPIRATORY (INHALATION) EVERY 6 HOURS PRN
Qty: 18 G | Refills: 2 | Status: SHIPPED | OUTPATIENT
Start: 2024-08-08

## 2024-08-08 NOTE — LETTER
August 8, 2024     Patient: María Gusman  YOB: 1995  Date of Visit: 8/8/2024      To Whom it May Concern:    María Gusman is under my professional care. María was seen in my office on 8/8/2024. María is compliant with the medication she is prescribed for her medical conditions and does have regular follow-up with our office for her medical conditions.  She is diagnosed with asthma and is prescribed an albuterol inhaler to be used as needed for this.  She also has migraines and is prescribed propranolol, Toradol, and Zofran for treatment of this condition.  Patient is also managed on Cymbalta daily for treatment of depression and anxiety symptoms.    If you have any questions or concerns, please don't hesitate to call.         Sincerely,          [facility]        CC: No Recipients

## 2024-08-08 NOTE — ASSESSMENT & PLAN NOTE
I feel that the lump on the patient's neck is most consistent with a cyst.  I did offer to have the patient complete an ultrasound of the area to confirm this however, she is currently uninsured so she refused at this time due to cost concerns.  She reports that she is in the process of getting insurance again so once she is insured she may have the imaging completed.  In the meantime, I did advise her to continue to ice the affected area and use Tylenol and ibuprofen as needed for pain.  She was also advised to make the office aware if the area increases in size or the pain worsens.

## 2024-08-08 NOTE — PROGRESS NOTES
Ambulatory Visit  Name: María Gusman      : 1995      MRN: [id number]  Encounter Provider: [facility]  Encounter Date: 2024   Encounter department: [facility] PRIMARY CARE    Assessment & Plan   1. Lump on neck  Assessment & Plan:  I feel that the lump on the patient's neck is most consistent with a cyst.  I did offer to have the patient complete an ultrasound of the area to confirm this however, she is currently uninsured so she refused at this time due to cost concerns.  She reports that she is in the process of getting insurance again so once she is insured she may have the imaging completed.  In the meantime, I did advise her to continue to ice the affected area and use Tylenol and ibuprofen as needed for pain.  She was also advised to make the office aware if the area increases in size or the pain worsens.  2. Mild intermittent asthma without complication  Assessment & Plan:  Well-controlled with as needed use of albuterol inhaler.  Orders:  -     albuterol (Proventil HFA) 90 mcg/act inhaler; Inhale 2 puffs every 6 (six) hours as needed for wheezing     History of Present Illness     Lump of neck: Patient reports that 3 days ago she began to note a lump of her left posterior neck.  She does report that the area is tender to touch.  She reports that she is able to move the lump when she palpates it.  She denies noting any skin changes in the affected area.    Asthma: Well-controlled with as needed use of albuterol inhaler.  Patient reports that she is using the albuterol intermittently at this point.        Review of Systems   Constitutional:  Negative for chills and fever.   HENT:  Negative for ear pain and sore throat.    Eyes:  Negative for pain and visual disturbance.   Respiratory:  Negative for cough, chest tightness, shortness of breath and wheezing.    Cardiovascular:  Negative for chest pain, palpitations and leg swelling.   Gastrointestinal:  Negative for  "abdominal pain, constipation, diarrhea, nausea and vomiting.   Endocrine: Negative for cold intolerance and heat intolerance.   Genitourinary:  Negative for decreased urine volume, dysuria and hematuria.   Musculoskeletal:  Negative for arthralgias, back pain and myalgias.   Skin:  Negative for color change and rash.   Allergic/Immunologic: Negative for environmental allergies.   Neurological:  Negative for dizziness, seizures, syncope, weakness, light-headedness, numbness and headaches.   Hematological:  Negative for adenopathy.   Psychiatric/Behavioral:  Negative for confusion. The patient is not nervous/anxious.    All other systems reviewed and are negative.      Objective     /70 (BP Location: Right arm, Patient Position: Sitting, Cuff Size: Standard)   Pulse 70   Temp 97.5 °F (36.4 °C) (Tympanic)   Ht 4' 11\" (1.499 m)   Wt 71.1 kg (156 lb 12.8 oz)   LMP 07/15/2024 (Exact Date)   SpO2 99%   BMI 31.67 kg/m²     Physical Exam  Vitals and nursing note reviewed.   Constitutional:       General: She is not in acute distress.     Appearance: Normal appearance. She is well-developed. She is not ill-appearing.   HENT:      Head: Normocephalic.   Eyes:      Conjunctiva/sclera: Conjunctivae normal.   Neck:     Cardiovascular:      Rate and Rhythm: Normal rate and regular rhythm.      Pulses: Normal pulses.           Carotid pulses are 2+ on the right side and 2+ on the left side.       Radial pulses are 2+ on the right side and 2+ on the left side.        Posterior tibial pulses are 2+ on the right side and 2+ on the left side.      Heart sounds: Normal heart sounds. No murmur heard.  Pulmonary:      Effort: Pulmonary effort is normal. No respiratory distress.      Breath sounds: Normal breath sounds. No decreased breath sounds, wheezing, rhonchi or rales.   Abdominal:      General: Abdomen is flat. Bowel sounds are normal. There is no distension.      Palpations: Abdomen is soft.      Tenderness: There is " no abdominal tenderness. There is no guarding.   Musculoskeletal:         General: No swelling. Normal range of motion.      Cervical back: Normal range of motion and neck supple.      Right lower leg: No edema.      Left lower leg: No edema.   Skin:     General: Skin is warm and dry.      Capillary Refill: Capillary refill takes less than 2 seconds.   Neurological:      General: No focal deficit present.      Mental Status: She is alert and oriented to person, place, and time.   Psychiatric:         Mood and Affect: Mood normal.         Behavior: Behavior normal.         Thought Content: Thought content normal.         Judgment: Judgment normal.       Administrative Statements

## 2024-08-30 ENCOUNTER — APPOINTMENT (OUTPATIENT)
Dept: URGENT CARE | Age: 29
End: 2024-08-30

## 2024-11-18 ENCOUNTER — TELEMEDICINE (OUTPATIENT)
Dept: FAMILY MEDICINE CLINIC | Facility: CLINIC | Age: 29
End: 2024-11-18

## 2024-11-18 ENCOUNTER — TELEPHONE (OUTPATIENT)
Age: 29
End: 2024-11-18

## 2024-11-18 DIAGNOSIS — G43.809 OTHER MIGRAINE WITHOUT STATUS MIGRAINOSUS, NOT INTRACTABLE: Primary | ICD-10-CM

## 2024-11-18 DIAGNOSIS — J45.20 MILD INTERMITTENT ASTHMA WITHOUT COMPLICATION: ICD-10-CM

## 2024-11-18 PROCEDURE — 99214 OFFICE O/P EST MOD 30 MIN: CPT | Performed by: NURSE PRACTITIONER

## 2024-11-18 RX ORDER — BUTALBITAL, ACETAMINOPHEN AND CAFFEINE 50; 325; 40 MG/1; MG/1; MG/1
1 TABLET ORAL EVERY 4 HOURS PRN
Qty: 30 TABLET | Refills: 0 | Status: SHIPPED | OUTPATIENT
Start: 2024-11-18

## 2024-11-18 RX ORDER — TOPIRAMATE 25 MG/1
TABLET, FILM COATED ORAL
Qty: 75 TABLET | Refills: 3 | Status: SHIPPED | OUTPATIENT
Start: 2024-11-18

## 2024-11-18 NOTE — ASSESSMENT & PLAN NOTE
Propranolol will be discontinued and patient will be trialed on Topamax instead for migraine prevention.  Fioricet was ordered to be used as needed every 4 hours for migraines and the patient was also advised that she can alternate with Toradol as needed if the migraine is not relieved by Fioricet.  I will reassess patient's symptoms at her next office visit.    Orders:    topiramate (Topamax) 25 mg tablet; 25 mg before bedtime for 1 week. Then 25 mg twice per day during week 2. Then 25 mg in the morning and 50 mg before bed during week 3. Then begin 50 mg twice per day during week 4 and continue this dose.    butalbital-acetaminophen-caffeine (Bac) -40 mg per tablet; Take 1 tablet by mouth every 4 (four) hours as needed for headaches or migraine

## 2024-11-18 NOTE — LETTER
November 18, 2024     Patient: María Gusman  YOB: 1995  Date of Visit: 11/18/2024      To Whom it May Concern:    María Gusman is under my professional care. María was seen in my office on 11/18/2024. María is excused from work from 11/18/2024-11/19/2024 as she is being treated for a medical condition.  She may return to work on 11/20/2024.    If you have any questions or concerns, please don't hesitate to call.         Sincerely,          [facility]        CC: No Recipients

## 2024-11-18 NOTE — TELEPHONE ENCOUNTER
Patient has an OV this afternoon for headaches. She wanted to know if she could change to a virtual appt because she has a headache. Confirmed with office that it is ok, relayed message back to patient. OV changed to Virtual appt.

## 2024-11-18 NOTE — PROGRESS NOTES
Virtual Regular Visit  Name: María Gusman      : 1995      MRN: [id number]  Encounter Provider: [facility]  Encounter Date: 2024   Encounter department: [facility] PRIMARY CARE      Verification of patient location:  Patient is located at Home in the following state in which I hold an active license [place] :  Assessment & Plan  Other migraine without status migrainosus, not intractable  Propranolol will be discontinued and patient will be trialed on Topamax instead for migraine prevention.  Fioricet was ordered to be used as needed every 4 hours for migraines and the patient was also advised that she can alternate with Toradol as needed if the migraine is not relieved by Fioricet.  I will reassess patient's symptoms at her next office visit.    Orders:    topiramate (Topamax) 25 mg tablet; 25 mg before bedtime for 1 week. Then 25 mg twice per day during week 2. Then 25 mg in the morning and 50 mg before bed during week 3. Then begin 50 mg twice per day during week 4 and continue this dose.    butalbital-acetaminophen-caffeine (Bac) -40 mg per tablet; Take 1 tablet by mouth every 4 (four) hours as needed for headaches or migraine    Mild intermittent asthma without complication  Well-controlled with as needed use of albuterol inhaler.             Encounter provider [facility]    The patient was identified by name and date of birth. María Gusman was informed that this is a telemedicine visit and that the visit is being conducted through the Epic Embedded platform. She agrees to proceed..  My office door was closed. No one else was in the room.  She acknowledged consent and understanding of privacy and security of the video platform. The patient has agreed to participate and understands they can discontinue the visit at any time.    Patient is aware this is a billable service.     History of Present Illness     Migraines: Patient was previously started on  propranolol 40 mg twice daily for migraine prophylaxis and Toradol 10 mg every 6 hours as needed as an abortive medication for migraines.  She reports that since starting propranolol she has not noted having less migraines.  She does report that Toradol somewhat helps to relieve her migraines.    Asthma: Well-controlled with as needed use of albuterol inhaler.  Patient reports rarely needing to use the inhaler.      Review of Systems   Constitutional:  Negative for chills and fever.   HENT:  Negative for ear pain and sore throat.    Eyes:  Positive for photophobia (associated with migraines). Negative for pain and visual disturbance.   Respiratory:  Negative for cough, chest tightness, shortness of breath and wheezing.    Cardiovascular:  Negative for chest pain, palpitations and leg swelling.   Gastrointestinal:  Negative for abdominal pain, constipation, diarrhea, nausea and vomiting.   Endocrine: Negative for cold intolerance and heat intolerance.   Genitourinary:  Negative for decreased urine volume, dysuria and hematuria.   Musculoskeletal:  Positive for neck pain (associated with migraines). Negative for arthralgias, back pain, myalgias and neck stiffness.   Skin:  Negative for color change and rash.   Allergic/Immunologic: Negative for environmental allergies.   Neurological:  Positive for headaches (recurrent migraines). Negative for dizziness, tremors, seizures, syncope, facial asymmetry, speech difficulty, weakness, light-headedness and numbness.   Hematological:  Negative for adenopathy.   Psychiatric/Behavioral:  Negative for confusion, dysphoric mood, self-injury, sleep disturbance and suicidal ideas. The patient is not nervous/anxious.    All other systems reviewed and are negative.      Objective   There were no vitals taken for this visit.    Physical Exam  Vitals reviewed: limited due to video visit.   Constitutional:       General: She is not in acute distress.     Appearance: Normal appearance.  She is not ill-appearing.   Neurological:      Mental Status: She is alert.         Visit Time  Total Visit Duration: 15 minutes

## 2024-11-21 ENCOUNTER — TELEPHONE (OUTPATIENT)
Dept: FAMILY MEDICINE CLINIC | Facility: CLINIC | Age: 29
End: 2024-11-21

## 2024-12-31 DIAGNOSIS — G43.809 OTHER MIGRAINE WITHOUT STATUS MIGRAINOSUS, NOT INTRACTABLE: ICD-10-CM

## 2025-01-02 RX ORDER — BUTALBITAL, ACETAMINOPHEN AND CAFFEINE 50; 325; 40 MG/1; MG/1; MG/1
1 TABLET ORAL EVERY 4 HOURS PRN
Qty: 30 TABLET | Refills: 0 | Status: SHIPPED | OUTPATIENT
Start: 2025-01-02

## 2025-01-09 ENCOUNTER — OFFICE VISIT (OUTPATIENT)
Dept: FAMILY MEDICINE CLINIC | Facility: CLINIC | Age: 30
End: 2025-01-09
Payer: COMMERCIAL

## 2025-01-09 VITALS
OXYGEN SATURATION: 98 % | SYSTOLIC BLOOD PRESSURE: 112 MMHG | HEIGHT: 59 IN | BODY MASS INDEX: 32.62 KG/M2 | WEIGHT: 161.8 LBS | DIASTOLIC BLOOD PRESSURE: 88 MMHG | HEART RATE: 95 BPM

## 2025-01-09 DIAGNOSIS — E66.811 CLASS 1 OBESITY DUE TO EXCESS CALORIES WITHOUT SERIOUS COMORBIDITY WITH BODY MASS INDEX (BMI) OF 32.0 TO 32.9 IN ADULT: ICD-10-CM

## 2025-01-09 DIAGNOSIS — F41.9 ANXIETY: ICD-10-CM

## 2025-01-09 DIAGNOSIS — R73.9 HYPERGLYCEMIA: ICD-10-CM

## 2025-01-09 DIAGNOSIS — R53.83 OTHER FATIGUE: ICD-10-CM

## 2025-01-09 DIAGNOSIS — E61.1 IRON DEFICIENCY: ICD-10-CM

## 2025-01-09 DIAGNOSIS — F32.89 OTHER DEPRESSION: ICD-10-CM

## 2025-01-09 DIAGNOSIS — G43.809 OTHER MIGRAINE WITHOUT STATUS MIGRAINOSUS, NOT INTRACTABLE: ICD-10-CM

## 2025-01-09 DIAGNOSIS — M54.2 NECK PAIN: Primary | ICD-10-CM

## 2025-01-09 DIAGNOSIS — E66.09 CLASS 1 OBESITY DUE TO EXCESS CALORIES WITHOUT SERIOUS COMORBIDITY WITH BODY MASS INDEX (BMI) OF 32.0 TO 32.9 IN ADULT: ICD-10-CM

## 2025-01-09 DIAGNOSIS — J45.20 MILD INTERMITTENT ASTHMA WITHOUT COMPLICATION: ICD-10-CM

## 2025-01-09 PROCEDURE — 99214 OFFICE O/P EST MOD 30 MIN: CPT | Performed by: NURSE PRACTITIONER

## 2025-01-09 RX ORDER — TIRZEPATIDE 2.5 MG/.5ML
2.5 INJECTION, SOLUTION SUBCUTANEOUS WEEKLY
Qty: 2 ML | Refills: 0 | Status: SHIPPED | OUTPATIENT
Start: 2025-01-09 | End: 2025-02-06

## 2025-01-09 RX ORDER — TOPIRAMATE 50 MG/1
50 TABLET, FILM COATED ORAL 2 TIMES DAILY
Qty: 60 TABLET | Refills: 2 | Status: SHIPPED | OUTPATIENT
Start: 2025-01-09

## 2025-01-10 ENCOUNTER — TELEPHONE (OUTPATIENT)
Age: 30
End: 2025-01-10

## 2025-01-10 NOTE — ASSESSMENT & PLAN NOTE
Repeat CMP and hemoglobin A1c are ordered to be completed prior to her next office visit.  Patient was advised to continue to limit sugar and carbohydrates in her diet.

## 2025-01-10 NOTE — ASSESSMENT & PLAN NOTE
Lab work was ordered to assess for possible causes of the patient's ongoing fatigue.  Orders:  •  CBC and differential; Future  •  Lyme Total AB W Reflex to IGM/IGG; Future  •  TSH, 3rd generation; Future  •  T4; Future

## 2025-01-10 NOTE — ASSESSMENT & PLAN NOTE
Patient will be maintained on current dosages of Topamax and Fioricet.  Orders:  •  topiramate (Topamax) 50 MG tablet; Take 1 tablet (50 mg total) by mouth 2 (two) times a day

## 2025-01-10 NOTE — TELEPHONE ENCOUNTER
Pt was told by her pharmacy that her insurance is no longer covering Zepbound. I suggested that she call her insurance company to confirm and see what options they will cover. Pt understood and stated she will call us back with a different option.

## 2025-01-10 NOTE — ASSESSMENT & PLAN NOTE
Prior Authorization Clinical Questions for Weight Management Pharmacotherapy    1. Does the patient have a contrainidcation to medication prescribed for weight management?: No  2. Does the patient have a diagnosis of obesity, confirmed by a BMI greater than or equal to 30 kg/m^2?: Yes  3. Does the patient have a BMI of greater than or equal to 27 kg/m^2 with at least one weight-related comorbidity/risk factor/complication (e.g. diabetes, dyslipidemia, coronary artery disease)?: Yes  4. Weight-related co-morbidities/risk factors: prediabetes, metabolic syndrome, dyslipidemia, asthma/reactive airway disease  5. Has the patient been on a weight loss regimen of low-calorie diet, increased physical activity, and lifestyle modifications for a minimum of 6 months?: Yes  6. Has the patient completed a comprehensive weight loss program (ie, Weight Watchers, Noom, Bariatrics, other danilo on phone)? If so, what?: No  7. Does the patient have a history of type 2 diabetes?: No  8. Has the member tried and failed other weight loss medication within the past 12 months?: No  9. Will the member use requested medication in combination with another GLP agonist or weight loss drug?: No  10. Is the medication a controlled substance?: No     Baseline weight (in pounds): 161 lbs     Zepbound 2.5 mg was ordered to be used weekly to help with weight loss.  I did advise the patient that if she is tolerating the medication well after 1 month she should make the office aware and I will titrate the dosage upward.  Weight management referral was also placed.    Orders:  •  tirzepatide (Zepbound) 2.5 mg/0.5 mL auto-injector; Inject 0.5 mL (2.5 mg total) under the skin once a week for 28 days  •  Ambulatory Referral to Weight Management; Future

## 2025-01-10 NOTE — ASSESSMENT & PLAN NOTE
Depression Screening Follow-up Plan: Patient's depression screening was positive with a PHQ-9 score of 12. Patient assessed for underlying major depression. They have no active suicidal ideations. Brief counseling provided and recommend additional follow-up/re-evaluation next office visit.  Well-controlled on current regimen.

## 2025-01-10 NOTE — PATIENT INSTRUCTIONS
Patient Education     Neck Pain Exercises   About this topic   The neck or cervical spine has 7 spinal bones that run from the base of your skull to the upper back. These spinal bones have discs in between them. Discs act as shock absorbers. Ligaments are strong bands of tissue that hold the bones together. Many muscles surround and attach on these bones. Nerves come off of the spinal cord and exit out of small spaces in between the spinal bones. You can have neck pain if any of these are injured or damaged. Exercises may help to make this problem better.  General   Before starting with a program, ask your doctor if you are healthy enough to do these exercises. Your doctor may have you work with a  or physical therapist to make a safe exercise program to meet your needs. You should not do the exercises if they cause sharp pains, if you feel dizzy, or if you have vision changes.  Stretching Exercises   Stretching exercises keep your muscles flexible. They also stop them from getting tight. Start by doing each of these stretches 2 to 3 times. In order for your body to make changes, you will need to hold these stretches for 20 to 30 seconds. Try to do the stretches 2 to 3 times each day. Do all exercises slowly.  Passive neck stretches:  Put your left hand on top of your head. Your other arm can be at your side or behind your back. Pull your head toward your left shoulder until you feel a gentle stretch on the right side of your neck. Repeat on the other side using your other hand.  Also, try this stretch by pulling in a diagonal direction. With your left hand on top of your head, pull your head down towards the direction of your left knee. You should feel this stretch toward the back on the right side of your neck. Repeat on the other side.  Active neck stretches:  Neck front-to-back motion ? Look down to the floor until you feel a stretch in the back of your neck. Hold. Next, look up to the ceiling until you  feel a stretch in the front of your neck. Hold.  Neck side-to-side motion ? Tilt your head to the side and bring your ear to your shoulder until you feel a stretch on the other side of your neck. Hold. Next, tilt your head to the other side until you feel a stretch. Hold.  Neck turning ? Turn only your head and look over your left shoulder until you feel stretching in the right side of your neck. Hold. Now turn only your head and look over your right shoulder until you feel a stretch in the left side of your neck. Hold.  Scalene stretches ? Grasp your head with the hand opposite the side you want to stretch. Pull your head to the side until you feel a stretch. Now, slowly turn your head so your chin is pointed upwards.  Chin tucks ? Stand straight or lie down on your back. Tuck your chin in and lengthen the back of your neck. Return to the starting position and repeat. It may help to stand up against a wall during this exercise. Try gently pushing your chin with two fingers while trying to flatten your neck against the wall. If you do this exercise lying down, try using a small rolled up washcloth under your neck. Push down into the washcloth when tucking in your chin.  Strengthening Exercises   Strengthening exercises keep your muscles firm and strong. Start by repeating each exercise 2 to 3 times. Work up to doing each exercise 10 times. Try to do the exercises 2 to 3 times each day. Hold each exercise for 3 to 5 seconds. Do all exercises slowly.  Shoulder blade squeezes ? Pinch your shoulder blades together on your upper back and hold 3 to 5 seconds. Relax.             What will the results be?   Less pain and stiffness  Better range of motion  Increased strength  Help you heal faster after an injury or surgery  Increase blood flow to a body part  Help you feel better and more relaxed  Give you more energy  More toned looking muscles  Better posture  Easier to do daily activities  Helpful tips   Stay active and  work out to keep your muscles strong and flexible.  Be sure you do not hold your breath when exercising. This can raise your blood pressure. If you tend to hold your breath, try counting out loud when exercising. If any exercise bothers you, stop right away.  Try swinging your arms at an easy pace for a few minutes to warm up your muscles. Do this again after exercising.  Doing exercises before a meal may be a good way to get into a routine.  Exercise may be slightly uncomfortable, but you should not have sharp pains. If you do get sharp pains, stop what you are doing. If the sharp pains continue, call your doctor.  Last Reviewed Date   2020-03-10  Consumer Information Use and Disclaimer   This generalized information is a limited summary of diagnosis, treatment, and/or medication information. It is not meant to be comprehensive and should be used as a tool to help the user understand and/or assess potential diagnostic and treatment options. It does NOT include all information about conditions, treatments, medications, side effects, or risks that may apply to a specific patient. It is not intended to be medical advice or a substitute for the medical advice, diagnosis, or treatment of a health care provider based on the health care provider's examination and assessment of a patient’s specific and unique circumstances. Patients must speak with a health care provider for complete information about their health, medical questions, and treatment options, including any risks or benefits regarding use of medications. This information does not endorse any treatments or medications as safe, effective, or approved for treating a specific patient. [employer] and its affiliates disclaim any warranty or liability relating to this information or the use thereof. The use of this information is governed by the Terms of Use, available at https://www.[employer].com/en/know/clinical-effectiveness-terms   Copyright   Copyright ©  2024 [employer] and its affiliates and/or licensors. All rights reserved.

## 2025-01-10 NOTE — PROGRESS NOTES
Name: María Gusman      : 1995      MRN: [id number]  Encounter Provider: BING Higgins  Encounter Date: 2025   Encounter department: [facility]  :  Assessment & Plan  Neck pain  X-ray of the cervical spine was ordered to assess for causes of the patient's ongoing neck pain.  I did also advise her to purchase more supportive pillows.  Neck exercises were also provided on the patient's AVS tonight.  Orders:  •  XR spine cervical complete 4 or 5 vw non injury; Future    Other fatigue  Lab work was ordered to assess for possible causes of the patient's ongoing fatigue.  Orders:  •  CBC and differential; Future  •  Lyme Total AB W Reflex to IGM/IGG; Future  •  TSH, 3rd generation; Future  •  T4; Future    Mild intermittent asthma without complication  Well-controlled with as needed use of albuterol inhaler.       Class 1 obesity due to excess calories without serious comorbidity with body mass index (BMI) of 32.0 to 32.9 in adult  Prior Authorization Clinical Questions for Weight Management Pharmacotherapy    1. Does the patient have a contrainidcation to medication prescribed for weight management?: No  2. Does the patient have a diagnosis of obesity, confirmed by a BMI greater than or equal to 30 kg/m^2?: Yes  3. Does the patient have a BMI of greater than or equal to 27 kg/m^2 with at least one weight-related comorbidity/risk factor/complication (e.g. diabetes, dyslipidemia, coronary artery disease)?: Yes  4. Weight-related co-morbidities/risk factors: prediabetes, metabolic syndrome, dyslipidemia, asthma/reactive airway disease  5. Has the patient been on a weight loss regimen of low-calorie diet, increased physical activity, and lifestyle modifications for a minimum of 6 months?: Yes  6. Has the patient completed a comprehensive weight loss program (ie, Weight Watchers, Noom, Bariatrics, other danilo on phone)? If so, what?: No  7. Does the patient have a history of type  2 diabetes?: No  8. Has the member tried and failed other weight loss medication within the past 12 months?: No  9. Will the member use requested medication in combination with another GLP agonist or weight loss drug?: No  10. Is the medication a controlled substance?: No     Baseline weight (in pounds): 161 lbs     Zepbound 2.5 mg was ordered to be used weekly to help with weight loss.  I did advise the patient that if she is tolerating the medication well after 1 month she should make the office aware and I will titrate the dosage upward.  Weight management referral was also placed.    Orders:  •  tirzepatide (Zepbound) 2.5 mg/0.5 mL auto-injector; Inject 0.5 mL (2.5 mg total) under the skin once a week for 28 days  •  Ambulatory Referral to Weight Management; Future    Other migraine without status migrainosus, not intractable  Patient will be maintained on current dosages of Topamax and Fioricet.  Orders:  •  topiramate (Topamax) 50 MG tablet; Take 1 tablet (50 mg total) by mouth 2 (two) times a day    Other depression  Depression Screening Follow-up Plan: Patient's depression screening was positive with a PHQ-9 score of 12. Patient assessed for underlying major depression. They have no active suicidal ideations. Brief counseling provided and recommend additional follow-up/re-evaluation next office visit.  Well-controlled on current regimen.       Anxiety  Well-controlled on current regimen.       Iron deficiency  Repeat iron panel is ordered to be completed prior to her next office visit.       Hyperglycemia  Repeat CMP and hemoglobin A1c are ordered to be completed prior to her next office visit.  Patient was advised to continue to limit sugar and carbohydrates in her diet.             Depression Screening and Follow-up Plan: Patient's depression screening was positive with a PHQ-9 score of 12.   Patient assessed for underlying major depression. Brief counseling provided and recommend additional  follow-up/re-evaluation next office visit.     History of Present Illness     Asthma: Well-controlled with as needed use of albuterol inhaler.  Patient reports rarely needing to use the inhaler.    Depression/anxiety: Well-controlled on current regimen.    Hyperglycemia: Patient's most recent hemoglobin A1c was 5.8 putting her in the prediabetes range.  Patient denies polydipsia, polyphagia, or polyuria.    Iron deficiency: Patient's most recent iron panel was normal.    Migraines: Well-controlled on current regimen.    Neck pain: Patient reports that over the past few weeks she has been experiencing recurrent posterior neck pain.  She does report that the pain seems to be worse when waking in the morning so she is questioning if this is related to her sleeping position.  She denies any radiation of the pain.  She also denies any associated numbness or paresthesias.    Obesity: Patient was previously prescribed Zepbound and this was helping her to lose weight however, this was discontinued as she lost her insurance.  Patient once again has insurance and she is interested in restarting Zepbound to help with weight loss.  She is also requesting referral to weight management.    Fatigue: Patient reports that she has been noting increased fatigue recently.      Review of Systems   Constitutional:  Positive for fatigue. Negative for chills and fever.   HENT:  Negative for ear pain and sore throat.    Eyes:  Negative for pain and visual disturbance.   Respiratory:  Negative for cough, chest tightness, shortness of breath and wheezing.    Cardiovascular:  Negative for chest pain, palpitations and leg swelling.   Gastrointestinal:  Negative for abdominal pain, constipation, diarrhea, nausea and vomiting.   Endocrine: Negative for cold intolerance and heat intolerance.   Genitourinary:  Negative for decreased urine volume, dysuria and hematuria.   Musculoskeletal:  Positive for neck pain. Negative for arthralgias, back pain,  "myalgias and neck stiffness.   Skin:  Negative for color change and rash.   Allergic/Immunologic: Negative for environmental allergies.   Neurological:  Positive for headaches (improved). Negative for dizziness, seizures, syncope, weakness, light-headedness and numbness.   Hematological:  Negative for adenopathy.   Psychiatric/Behavioral:  Negative for confusion, dysphoric mood, self-injury, sleep disturbance and suicidal ideas. The patient is not nervous/anxious.    All other systems reviewed and are negative.      Objective   /88 (BP Location: Right arm, Patient Position: Sitting, Cuff Size: Standard)   Pulse 95   Ht 4' 11\" (1.499 m)   Wt 73.4 kg (161 lb 12.8 oz)   SpO2 98%   BMI 32.68 kg/m²      Physical Exam  Vitals and nursing note reviewed.   Constitutional:       General: She is not in acute distress.     Appearance: Normal appearance. She is well-developed. She is not ill-appearing.   HENT:      Head: Normocephalic.   Eyes:      Conjunctiva/sclera: Conjunctivae normal.   Cardiovascular:      Rate and Rhythm: Normal rate and regular rhythm.      Pulses: Normal pulses.           Carotid pulses are 2+ on the right side and 2+ on the left side.       Radial pulses are 2+ on the right side and 2+ on the left side.        Posterior tibial pulses are 2+ on the right side and 2+ on the left side.      Heart sounds: Normal heart sounds. No murmur heard.  Pulmonary:      Effort: Pulmonary effort is normal. No respiratory distress.      Breath sounds: Normal breath sounds. No decreased breath sounds, wheezing, rhonchi or rales.   Abdominal:      General: Abdomen is flat. Bowel sounds are normal. There is no distension.      Palpations: Abdomen is soft.      Tenderness: There is no abdominal tenderness. There is no guarding.   Musculoskeletal:         General: No swelling. Normal range of motion.      Cervical back: Normal range of motion and neck supple.      Right lower leg: No edema.      Left lower leg: " No edema.   Skin:     General: Skin is warm and dry.      Capillary Refill: Capillary refill takes less than 2 seconds.   Neurological:      General: No focal deficit present.      Mental Status: She is alert and oriented to person, place, and time.   Psychiatric:         Mood and Affect: Mood normal.         Behavior: Behavior normal.         Thought Content: Thought content normal.         Judgment: Judgment normal.

## 2025-01-10 NOTE — ASSESSMENT & PLAN NOTE
X-ray of the cervical spine was ordered to assess for causes of the patient's ongoing neck pain.  I did also advise her to purchase more supportive pillows.  Neck exercises were also provided on the patient's AVS tonight.  Orders:  •  XR spine cervical complete 4 or 5 vw non injury; Future

## 2025-01-24 DIAGNOSIS — G43.809 OTHER MIGRAINE WITHOUT STATUS MIGRAINOSUS, NOT INTRACTABLE: ICD-10-CM

## 2025-01-24 RX ORDER — TOPIRAMATE 50 MG/1
50 TABLET, FILM COATED ORAL 2 TIMES DAILY
Qty: 180 TABLET | Refills: 0 | Status: SHIPPED | OUTPATIENT
Start: 2025-01-24

## 2025-01-31 ENCOUNTER — HOSPITAL ENCOUNTER (EMERGENCY)
Facility: HOSPITAL | Age: 30
Discharge: HOME | End: 2025-01-31
Attending: EMERGENCY MEDICINE
Payer: COMMERCIAL

## 2025-01-31 ENCOUNTER — APPOINTMENT (EMERGENCY)
Dept: CT IMAGING | Facility: HOSPITAL | Age: 30
End: 2025-01-31
Payer: COMMERCIAL

## 2025-01-31 VITALS
RESPIRATION RATE: 14 BRPM | TEMPERATURE: 97.8 F | DIASTOLIC BLOOD PRESSURE: 80 MMHG | SYSTOLIC BLOOD PRESSURE: 130 MMHG | BODY MASS INDEX: 32.68 KG/M2 | OXYGEN SATURATION: 99 % | HEART RATE: 56 BPM | WEIGHT: 161.82 LBS

## 2025-01-31 DIAGNOSIS — R16.0 HEPATOMEGALY: ICD-10-CM

## 2025-01-31 DIAGNOSIS — R10.9 RIGHT SIDED ABDOMINAL PAIN: Primary | ICD-10-CM

## 2025-01-31 LAB
ALBUMIN SERPL BCG-MCNC: 3.9 G/DL (ref 3.5–5)
ALP SERPL-CCNC: 77 U/L (ref 34–104)
ALT SERPL W P-5'-P-CCNC: 37 U/L (ref 7–52)
ANION GAP SERPL CALCULATED.3IONS-SCNC: 3 MMOL/L (ref 4–13)
AST SERPL W P-5'-P-CCNC: 25 U/L (ref 13–39)
BASOPHILS # BLD AUTO: 0.07 THOUSANDS/ΜL (ref 0–0.1)
BASOPHILS NFR BLD AUTO: 1 % (ref 0–1)
BILIRUB SERPL-MCNC: 0.73 MG/DL (ref 0.2–1)
BILIRUB UR QL STRIP: NEGATIVE
BUN SERPL-MCNC: 6 MG/DL (ref 5–25)
CALCIUM SERPL-MCNC: 8.5 MG/DL (ref 8.4–10.2)
CHLORIDE SERPL-SCNC: 106 MMOL/L (ref 96–108)
CLARITY UR: CLEAR
CO2 SERPL-SCNC: 28 MMOL/L (ref 21–32)
COLOR UR: YELLOW
CREAT SERPL-MCNC: 0.6 MG/DL (ref 0.6–1.3)
EOSINOPHIL # BLD AUTO: 0.17 THOUSAND/ΜL (ref 0–0.61)
EOSINOPHIL NFR BLD AUTO: 2 % (ref 0–6)
ERYTHROCYTE [DISTWIDTH] IN BLOOD BY AUTOMATED COUNT: 13.2 % (ref 11.6–15.1)
EXT PREGNANCY TEST URINE: NEGATIVE
EXT. CONTROL: NORMAL
GFR SERPL CREATININE-BSD FRML MDRD: 121 ML/MIN/1.73SQ M
GLUCOSE SERPL-MCNC: 120 MG/DL (ref 65–140)
GLUCOSE UR STRIP-MCNC: NEGATIVE MG/DL
HCT VFR BLD AUTO: 35.5 % (ref 34.8–46.1)
HGB BLD-MCNC: 11.8 G/DL (ref 11.5–15.4)
HGB UR QL STRIP.AUTO: NEGATIVE
IMM GRANULOCYTES # BLD AUTO: 0.04 THOUSAND/UL (ref 0–0.2)
IMM GRANULOCYTES NFR BLD AUTO: 1 % (ref 0–2)
KETONES UR STRIP-MCNC: NEGATIVE MG/DL
LEUKOCYTE ESTERASE UR QL STRIP: NEGATIVE
LIPASE SERPL-CCNC: 22 U/L (ref 11–82)
LYMPHOCYTES # BLD AUTO: 2.63 THOUSANDS/ΜL (ref 0.6–4.47)
LYMPHOCYTES NFR BLD AUTO: 35 % (ref 14–44)
MCH RBC QN AUTO: 28.1 PG (ref 26.8–34.3)
MCHC RBC AUTO-ENTMCNC: 33.2 G/DL (ref 31.4–37.4)
MCV RBC AUTO: 85 FL (ref 82–98)
MONOCYTES # BLD AUTO: 0.45 THOUSAND/ΜL (ref 0.17–1.22)
MONOCYTES NFR BLD AUTO: 6 % (ref 4–12)
NEUTROPHILS # BLD AUTO: 4.23 THOUSANDS/ΜL (ref 1.85–7.62)
NEUTS SEG NFR BLD AUTO: 55 % (ref 43–75)
NITRITE UR QL STRIP: NEGATIVE
NRBC BLD AUTO-RTO: 0 /100 WBCS
PH UR STRIP.AUTO: 5.5 [PH] (ref 4.5–8)
PLATELET # BLD AUTO: 281 THOUSANDS/UL (ref 149–390)
PMV BLD AUTO: 10 FL (ref 8.9–12.7)
POTASSIUM SERPL-SCNC: 3.6 MMOL/L (ref 3.5–5.3)
PROT SERPL-MCNC: 7.1 G/DL (ref 6.4–8.4)
PROT UR STRIP-MCNC: NEGATIVE MG/DL
RBC # BLD AUTO: 4.2 MILLION/UL (ref 3.81–5.12)
SODIUM SERPL-SCNC: 137 MMOL/L (ref 135–147)
SP GR UR STRIP.AUTO: >=1.03 (ref 1–1.03)
UROBILINOGEN UR QL STRIP.AUTO: 0.2 E.U./DL
WBC # BLD AUTO: 7.59 THOUSAND/UL (ref 4.31–10.16)

## 2025-01-31 PROCEDURE — 83690 ASSAY OF LIPASE: CPT | Performed by: PHYSICIAN ASSISTANT

## 2025-01-31 PROCEDURE — 96361 HYDRATE IV INFUSION ADD-ON: CPT

## 2025-01-31 PROCEDURE — 80053 COMPREHEN METABOLIC PANEL: CPT | Performed by: PHYSICIAN ASSISTANT

## 2025-01-31 PROCEDURE — 74177 CT ABD & PELVIS W/CONTRAST: CPT

## 2025-01-31 PROCEDURE — 81025 URINE PREGNANCY TEST: CPT | Performed by: PHYSICIAN ASSISTANT

## 2025-01-31 PROCEDURE — 99284 EMERGENCY DEPT VISIT MOD MDM: CPT

## 2025-01-31 PROCEDURE — 36415 COLL VENOUS BLD VENIPUNCTURE: CPT | Performed by: PHYSICIAN ASSISTANT

## 2025-01-31 PROCEDURE — 96374 THER/PROPH/DIAG INJ IV PUSH: CPT

## 2025-01-31 PROCEDURE — 85025 COMPLETE CBC W/AUTO DIFF WBC: CPT | Performed by: PHYSICIAN ASSISTANT

## 2025-01-31 PROCEDURE — 81003 URINALYSIS AUTO W/O SCOPE: CPT

## 2025-01-31 PROCEDURE — 99285 EMERGENCY DEPT VISIT HI MDM: CPT | Performed by: PHYSICIAN ASSISTANT

## 2025-01-31 RX ORDER — KETOROLAC TROMETHAMINE 30 MG/ML
15 INJECTION, SOLUTION INTRAMUSCULAR; INTRAVENOUS ONCE
Status: COMPLETED | OUTPATIENT
Start: 2025-01-31 | End: 2025-01-31

## 2025-01-31 RX ORDER — NAPROXEN 500 MG/1
500 TABLET ORAL 2 TIMES DAILY WITH MEALS
Qty: 30 TABLET | Refills: 0 | Status: SHIPPED | OUTPATIENT
Start: 2025-01-31

## 2025-01-31 RX ADMIN — IOHEXOL 100 ML: 350 INJECTION, SOLUTION INTRAVENOUS at 08:55

## 2025-01-31 RX ADMIN — KETOROLAC TROMETHAMINE 15 MG: 30 INJECTION, SOLUTION INTRAMUSCULAR; INTRAVENOUS at 07:47

## 2025-01-31 RX ADMIN — SODIUM CHLORIDE 1000 ML: 0.9 INJECTION, SOLUTION INTRAVENOUS at 07:36

## 2025-01-31 NOTE — ED PROVIDER NOTES
Time reflects when diagnosis was documented in both MDM as applicable and the Disposition within this note       Time User Action Codes Description Comment    1/31/2025 10:08 AM Malena Henson Add [R10.9] Right sided abdominal pain     1/31/2025 10:09 AM Reddy Hensonlydanielle GROVE Add [R16.0] Hepatomegaly           ED Disposition       ED Disposition   Discharge    Condition   Stable    Date/Time   Fri Jan 31, 2025 10:08 AM    Comment   María Gusman discharge to home/self care.                   Assessment & Plan       Medical Decision Making  29y.o female presents to the ER for nausea, right sided abdominal pain and bloating since yesterday. Vitals are stable. Patient is in no acute distress. On exam, moist mucous membranes seen. Breathing is non-labored. No tachypnea or accessory muscle use. Lungs are clear. Heart is regular rate and rhythm. Abdomen is soft but tender in the RUQ and RLQ. No guarding, rigidity, distention or pulsatile masses palpated. DDX consists of but not limited to: cholecystitis, appendicitis, gastroenteritis, abdominal pathology. Will check labs and imaging.    0742 PREGNANCY TEST URINE: Negative    0742 Urine Macroscopic, POC    0744 WBC: 7.59    0744 Hemoglobin: 11.8    0744 Platelet Count: 281    0824 LIPASE: 22    0826 Comprehensive metabolic panel(!)    0929 CT abdomen pelvis with contrast  1.Mild hepatomegaly with fatty infiltration.     2.Stool in the right hemicolon.     3.Mild diffuse bladder wall thickening. Cystitis in the differential.    1005      Informed patient of lab and imaging findings. Patient reports symptoms are improving from when they first began. Will discharge.    The management plan was discussed in detail with the patient at bedside and all questions were answered.  Prior to discharge, we provided both verbal and written instructions.  We discussed with the patient the signs and symptoms for which to return to the emergency department.  All questions were answered and  patient was comfortable with the plan of care and discharged to home.  Instructed the patient to follow up with the primary care provider and/or specialist provided and their written instructions.  The patient verbalized understanding of our discussion and plan of care, and agrees to return to the Emergency Department for concerns and progression of illness.    At discharge, I instructed the patient to:  -follow up with pcp  -take Naproxen as prescribed  -rest and drink plenty of fluids  -follow up with GI  -return to the ER if symptoms worsened or new symptoms arose  Patient agreed to this plan and was stable at time of discharge.     Problems Addressed:  Hepatomegaly: chronic illness or injury  Right sided abdominal pain: acute illness or injury    Amount and/or Complexity of Data Reviewed  Independent Historian:      Details: Patient is historian  Labs: ordered. Decision-making details documented in ED Course.  Radiology: ordered. Decision-making details documented in ED Course.    Risk  Prescription drug management.        ED Course as of 01/31/25 1016 Fri Jan 31, 2025   0742 PREGNANCY TEST URINE: Negative   0742 Urine Macroscopic, POC   0744 WBC: 7.59   0744 Hemoglobin: 11.8   0744 Platelet Count: 281   0824 LIPASE: 22   0826 Comprehensive metabolic panel(!)   0929 CT abdomen pelvis with contrast  1.Mild hepatomegaly with fatty infiltration.     2.Stool in the right hemicolon.     3.Mild diffuse bladder wall thickening. Cystitis in the differential.         Medications   sodium chloride 0.9 % bolus 1,000 mL (1,000 mL Intravenous New Bag 1/31/25 0736)   ketorolac (TORADOL) injection 15 mg (15 mg Intravenous Given 1/31/25 0747)   iohexol (OMNIPAQUE) 350 MG/ML injection (MULTI-DOSE) 100 mL (100 mL Intravenous Given 1/31/25 0855)       ED Risk Strat Scores                                              History of Present Illness       Chief Complaint   Patient presents with    Abdominal Pain     Worsening abdominal  pain, nausea, and bloating. Sx started 2 days ago, got worse over night. Taking tylenol without relief. Last dose 0200       Past Medical History:   Diagnosis Date    Abnormal Pap smear of cervix 2015 pap negative;    Anxiety     Asthma     Bronchitis 2024    Chlamydia 2014    Costochondritis 2023    Depression     Fatty liver     GERD (gastroesophageal reflux disease)     Gestational diabetes     Leukocytosis 2024    Migraine     will be seeing an neurologist    Otitis media     Pre-diabetes     Sunburn 2024    Visual impairment     Yeast infection 2023      Past Surgical History:   Procedure Laterality Date     SECTION      induced due to GDM, ? ruptured placenta w/ emergency C/S    LYMPHADENECTOMY        Family History   Problem Relation Age of Onset    Cancer Mother 54        Multiple Myeloma    Diabetes Maternal Grandmother     Breast cancer Other     Colon cancer Neg Hx     Ovarian cancer Neg Hx     Uterine cancer Neg Hx       Social History     Tobacco Use    Smoking status: Never    Smokeless tobacco: Never   Vaping Use    Vaping status: Never Used   Substance Use Topics    Alcohol use: Not Currently    Drug use: Never      E-Cigarette/Vaping    E-Cigarette Use Never User       E-Cigarette/Vaping Substances    Nicotine No     THC No     CBD No     Flavoring No     Other No     Unknown No       I have reviewed and agree with the history as documented.     29y.o female with PMH of anxiety, asthma, depression, fatty liver, GERD and migraine presents to the ER for nausea, right sided abdominal pain and bloating since yesterday. Patient took Tylenol for symptoms. She describes her pain as pressure and non-radiating. Pain comes and goes mostly with movement or laying on her right side. She denies sick contacts or recent travel. She denies fever, chills, URI symptoms, chest pain, dyspnea, vomiting, diarrhea, urinary symptoms, weakness or paresthesias.      History  provided by:  Patient   used: No        Review of Systems   Constitutional:  Negative for activity change, appetite change, chills and fever.   HENT:  Negative for congestion, drooling, ear discharge, ear pain, facial swelling, rhinorrhea and sore throat.    Eyes:  Negative for redness.   Respiratory:  Negative for cough and shortness of breath.    Cardiovascular:  Negative for chest pain.   Gastrointestinal:  Positive for abdominal pain and nausea. Negative for diarrhea and vomiting.   Genitourinary:  Negative for dysuria, frequency, hematuria and urgency.   Musculoskeletal:  Negative for neck stiffness.   Skin:  Negative for rash.   Allergic/Immunologic: Negative for food allergies.   Neurological:  Negative for weakness and numbness.           Objective       ED Triage Vitals   Temperature Pulse Blood Pressure Respirations SpO2 Patient Position - Orthostatic VS   01/31/25 0710 01/31/25 0710 01/31/25 0710 01/31/25 0710 01/31/25 0710 01/31/25 0933   97.8 °F (36.6 °C) 72 126/74 18 98 % Sitting      Temp src Heart Rate Source BP Location FiO2 (%) Pain Score    -- 01/31/25 0933 01/31/25 0933 -- --     Monitor Right arm        Vitals      Date and Time Temp Pulse SpO2 Resp BP Pain Score FACES Pain Rating User   01/31/25 0933 -- 56 99 % 14 130/80 -- -- PAULINE   01/31/25 0710 97.8 °F (36.6 °C) 72 98 % 18 126/74 -- -- BRENT            Physical Exam  Vitals and nursing note reviewed.   Constitutional:       General: She is not in acute distress.     Appearance: She is not toxic-appearing.   HENT:      Head: Normocephalic and atraumatic.      Mouth/Throat:      Lips: Pink. No lesions.      Mouth: Mucous membranes are moist.   Eyes:      Conjunctiva/sclera: Conjunctivae normal.   Neck:      Trachea: No tracheal deviation.   Cardiovascular:      Rate and Rhythm: Normal rate and regular rhythm.      Heart sounds: Normal heart sounds, S1 normal and S2 normal. No murmur heard.     No friction rub. No gallop.    Pulmonary:      Effort: Pulmonary effort is normal. No respiratory distress.      Breath sounds: Normal breath sounds. No decreased breath sounds, wheezing, rhonchi or rales.   Chest:      Chest wall: No tenderness.   Abdominal:      General: Bowel sounds are normal. There is no distension.      Palpations: Abdomen is soft.      Tenderness: There is abdominal tenderness in the right upper quadrant and right lower quadrant. There is no guarding or rebound.   Musculoskeletal:      Cervical back: Normal range of motion and neck supple.   Skin:     General: Skin is warm and dry.      Findings: No rash.   Neurological:      Mental Status: She is alert.      GCS: GCS eye subscore is 4. GCS verbal subscore is 5. GCS motor subscore is 6.   Psychiatric:         Mood and Affect: Mood normal.         Results Reviewed       Procedure Component Value Units Date/Time    Comprehensive metabolic panel [[id number]]  (Abnormal) Collected: 01/31/25 0735    Lab Status: Final result Specimen: Blood from Arm, Left Updated: 01/31/25 0818     Sodium 137 mmol/L      Potassium 3.6 mmol/L      Chloride 106 mmol/L      CO2 28 mmol/L      ANION GAP 3 mmol/L      BUN 6 mg/dL      Creatinine 0.60 mg/dL      Glucose 120 mg/dL      Calcium 8.5 mg/dL      AST 25 U/L      ALT 37 U/L      Alkaline Phosphatase 77 U/L      Total Protein 7.1 g/dL      Albumin 3.9 g/dL      Total Bilirubin 0.73 mg/dL      eGFR 121 ml/min/1.73sq m     Narrative:      National Kidney Disease Foundation guidelines for Chronic Kidney Disease (CKD):     Stage 1 with normal or high GFR (GFR > 90 mL/min/1.73 square meters)    Stage 2 Mild CKD (GFR = 60-89 mL/min/1.73 square meters)    Stage 3A Moderate CKD (GFR = 45-59 mL/min/1.73 square meters)    Stage 3B Moderate CKD (GFR = 30-44 mL/min/1.73 square meters)    Stage 4 Severe CKD (GFR = 15-29 mL/min/1.73 square meters)    Stage 5 End Stage CKD (GFR <15 mL/min/1.73 square meters)  Note: GFR calculation is accurate only with a  steady state creatinine    Lipase [[id number]]  (Normal) Collected: 01/31/25 0735    Lab Status: Final result Specimen: Blood from Arm, Left Updated: 01/31/25 0818     Lipase 22 u/L     CBC and differential [[id number]] Collected: 01/31/25 0735    Lab Status: Final result Specimen: Blood from Arm, Left Updated: 01/31/25 0743     WBC 7.59 Thousand/uL      RBC 4.20 Million/uL      Hemoglobin 11.8 g/dL      Hematocrit 35.5 %      MCV 85 fL      MCH 28.1 pg      MCHC 33.2 g/dL      RDW 13.2 %      MPV 10.0 fL      Platelets 281 Thousands/uL      nRBC 0 /100 WBCs      Segmented % 55 %      Immature Grans % 1 %      Lymphocytes % 35 %      Monocytes % 6 %      Eosinophils Relative 2 %      Basophils Relative 1 %      Absolute Neutrophils 4.23 Thousands/µL      Absolute Immature Grans 0.04 Thousand/uL      Absolute Lymphocytes 2.63 Thousands/µL      Absolute Monocytes 0.45 Thousand/µL      Eosinophils Absolute 0.17 Thousand/µL      Basophils Absolute 0.07 Thousands/µL     Urine Macroscopic, POC [[id number]] Collected: 01/31/25 0740    Lab Status: Final result Specimen: Urine Updated: 01/31/25 0741     Color, UA Yellow     Clarity, UA Clear     pH, UA 5.5     Leukocytes, UA Negative     Nitrite, UA Negative     Protein, UA Negative mg/dl      Glucose, UA Negative mg/dl      Ketones, UA Negative mg/dl      Urobilinogen, UA 0.2 E.U./dl      Bilirubin, UA Negative     Occult Blood, UA Negative     Specific Gravity, UA >=1.030    Narrative:      CLINITEK RESULT    POCT pregnancy, urine [[id number]]  (Normal) Collected: 01/31/25 0741    Lab Status: Final result Updated: 01/31/25 0741     EXT Preg Test, Ur Negative     Control Valid            CT abdomen pelvis with contrast   Final Interpretation by Gary Snyder MD (01/31 0918)      1.Mild hepatomegaly with fatty infiltration.      2.Stool in the right hemicolon.      3.Mild diffuse bladder wall thickening. Cystitis in the differential.         Workstation performed:  [id number]             Procedures    ED Medication and Procedure Management   Prior to Admission Medications   Prescriptions Last Dose Informant Patient Reported? Taking?   DULoxetine (CYMBALTA) 20 mg capsule  Self No No   Sig: TAKE 1 CAPSULE BY MOUTH EVERY DAY   albuterol (Proventil HFA) 90 mcg/act inhaler   No No   Sig: Inhale 2 puffs every 6 (six) hours as needed for wheezing   butalbital-acetaminophen-caffeine (FIORICET,ESGIC) -40 mg per tablet   No No   Sig: TAKE 1 TABLET BY MOUTH EVERY 4 (FOUR) HOURS AS NEEDED FOR HEADACHES OR MIGRAINE   ferrous sulfate 324 (65 Fe) mg  Self No No   Sig: Take 1 tablet (324 mg total) by mouth daily before breakfast   ketorolac (TORADOL) 10 mg tablet  Self No No   Sig: Take 1 tablet (10 mg total) by mouth every 6 (six) hours as needed (Migraines)   ondansetron (Zofran ODT) 4 mg disintegrating tablet   No No   Sig: Take 1 tablet (4 mg total) by mouth every 6 (six) hours as needed for nausea or vomiting   tirzepatide (Zepbound) 2.5 mg/0.5 mL auto-injector   No No   Sig: Inject 0.5 mL (2.5 mg total) under the skin once a week for 28 days   topiramate (TOPAMAX) 50 MG tablet   No No   Sig: TAKE 1 TABLET BY MOUTH TWICE A DAY      Facility-Administered Medications: None     Patient's Medications   Discharge Prescriptions    NAPROXEN (NAPROSYN) 500 MG TABLET    Take 1 tablet (500 mg total) by mouth 2 (two) times a day with meals       Start Date: 1/31/2025 End Date: --       Order Dose: 500 mg       Quantity: 30 tablet    Refills: 0     No discharge procedures on file.  ED SEPSIS DOCUMENTATION   Time reflects when diagnosis was documented in both MDM as applicable and the Disposition within this note       Time User Action Codes Description Comment    1/31/2025 10:08 AM Malena Henson Add [R10.9] Right sided abdominal pain     1/31/2025 10:09 AM Malena Henson Add [R16.0] Hepatomegaly                  Malena Henson PA-C  01/31/25 1016

## 2025-01-31 NOTE — DISCHARGE INSTRUCTIONS
DISCHARGE INSTRUCTIONS:    FOLLOW UP WITH YOUR PRIMARY CARE PROVIDER OR THE RECOMMENDED HEALTH CLINIC. MAKE AN APPOINTMENT TO BE SEEN.     TAKE  MEDICATION AS PRESCRIBED. IF RASH, SHORTNESS OF BREATH OR TROUBLE SWALLOWING, STOP TAKING THE MEDICATION AND BE SEEN.     FOLLOW UP WITH THE RECOMMENDED GASTROENTEROLOGIST.    REST AND DRINK PLENTY OF FLUIDS.    IF SYMPTOMS WORSEN OR NEW SYMPTOMS ARISE, RETURN TO THE ER TO BE SEEN.

## 2025-02-03 ENCOUNTER — VBI (OUTPATIENT)
Dept: FAMILY MEDICINE CLINIC | Facility: CLINIC | Age: 30
End: 2025-02-03

## 2025-02-03 NOTE — TELEPHONE ENCOUNTER
02/03/25 10:12 AM    Patient contacted post ED visit, first outreach attempt made. Message was left for patient to return a call to the VBI Department at [facility]: Phone [phone].    Thank you.  Daniel Dalton MA  [facility]

## 2025-02-04 NOTE — TELEPHONE ENCOUNTER
02/04/25 10:14 AM    Patient contacted post ED visit, second outreach attempt made. Message was left for patient to return a call to the VBI Department at [facility]: Phone [phone].    Thank you.  Daniel Dalton MA  [facility]

## 2025-02-05 ENCOUNTER — RA CDI HCC (OUTPATIENT)
Dept: OTHER | Facility: HOSPITAL | Age: 30
End: 2025-02-05

## 2025-02-05 NOTE — TELEPHONE ENCOUNTER
02/05/25 2:44 PM    Patient contacted post ED visit, VBI department spoke with patient/caregiver and outreach questions were declined.    Thank you.  Daniel Dalton MA  [facility]

## 2025-02-07 ENCOUNTER — TELEMEDICINE (OUTPATIENT)
Dept: FAMILY MEDICINE CLINIC | Facility: CLINIC | Age: 30
End: 2025-02-07
Payer: COMMERCIAL

## 2025-02-07 DIAGNOSIS — A08.4 VIRAL GASTROENTERITIS: Primary | ICD-10-CM

## 2025-02-07 DIAGNOSIS — J45.20 MILD INTERMITTENT ASTHMA WITHOUT COMPLICATION: ICD-10-CM

## 2025-02-07 PROCEDURE — 99214 OFFICE O/P EST MOD 30 MIN: CPT | Performed by: NURSE PRACTITIONER

## 2025-02-07 RX ORDER — DICYCLOMINE HYDROCHLORIDE 10 MG/1
10 CAPSULE ORAL 4 TIMES DAILY PRN
Qty: 30 CAPSULE | Refills: 0 | Status: SHIPPED | OUTPATIENT
Start: 2025-02-07

## 2025-02-07 RX ORDER — ONDANSETRON 4 MG/1
4 TABLET, ORALLY DISINTEGRATING ORAL EVERY 6 HOURS PRN
Qty: 30 TABLET | Refills: 0 | Status: SHIPPED | OUTPATIENT
Start: 2025-02-07

## 2025-02-07 NOTE — LETTER
February 7, 2025     Patient: María Gusman  YOB: 1995  Date of Visit: 2/7/2025      To Whom it May Concern:    María Gusman is under my professional care. María was seen in my office on 2/7/2025. María is excused from work on 2/6/2025 as she was being treated for a medical condition.    If you have any questions or concerns, please don't hesitate to call.         Sincerely,          BING Higgins        CC: No Recipients

## 2025-02-07 NOTE — ASSESSMENT & PLAN NOTE
Patient's symptoms are consistent with viral gastroenteritis.  I did advise her that her symptoms should hopefully resolve over the next few days.  She was prescribed Zofran 4 mg to be used every 6 hours as needed for nausea.  She was also prescribed Bentyl 10 mg to be used every 6 hours as needed for abdominal pain.  She was advised to continue to increase her water intake and to follow the brat diet for nutrition.  Orders:  •  ondansetron (ZOFRAN-ODT) 4 mg disintegrating tablet; Take 1 tablet (4 mg total) by mouth every 6 (six) hours as needed for nausea or vomiting  •  dicyclomine (BENTYL) 10 mg capsule; Take 1 capsule (10 mg total) by mouth 4 (four) times a day as needed (Abdominal pain)

## 2025-02-07 NOTE — PROGRESS NOTES
Virtual Regular Visit  Name: María Gusman      : 1995      MRN: [id number]  Encounter Provider: BING Higgins  Encounter Date: 2025   Encounter department: [facility] PRIMARY [facility]ARE      Verification of patient location:  Patient is located at Home in the following state in which I hold an active license [place] :  Assessment & Plan  Viral gastroenteritis  Patient's symptoms are consistent with viral gastroenteritis.  I did advise her that her symptoms should hopefully resolve over the next few days.  She was prescribed Zofran 4 mg to be used every 6 hours as needed for nausea.  She was also prescribed Bentyl 10 mg to be used every 6 hours as needed for abdominal pain.  She was advised to continue to increase her water intake and to follow the brat diet for nutrition.  Orders:  •  ondansetron (ZOFRAN-ODT) 4 mg disintegrating tablet; Take 1 tablet (4 mg total) by mouth every 6 (six) hours as needed for nausea or vomiting  •  dicyclomine (BENTYL) 10 mg capsule; Take 1 capsule (10 mg total) by mouth 4 (four) times a day as needed (Abdominal pain)    Mild intermittent asthma without complication  Well-controlled with as needed use of albuterol inhaler.           Encounter provider BING Higgins    The patient was identified by name and date of birth. María Gusman was informed that this is a telemedicine visit and that the visit is being conducted through the Epic Embedded platform. She agrees to proceed..  My office door was closed. No one else was in the room.  She acknowledged consent and understanding of privacy and security of the video platform. The patient has agreed to participate and understands they can discontinue the visit at any time.    Patient is aware this is a billable service.     History of Present Illness     Viral gastroenteritis: Patient was initially evaluated in the ED on 2025 due to symptoms of right-sided abdominal pain, diarrhea, and nausea.  The  patient did have a CT of her abdomen and pelvis completed at that time which did show stool in the right hemicolon but was normal otherwise.  Patient he is reporting continued symptoms of diarrhea, abdominal pain, nasal congestion, and nausea.  Overall, the patient reports that her symptoms are improving but have not yet resolved.    Asthma: Patient is currently managed on albuterol inhaler as needed.  Patient denies needing to use her inhaler since her symptoms began.      Review of Systems   Constitutional:  Negative for chills and fever.   HENT:  Positive for congestion. Negative for ear pain, postnasal drip, rhinorrhea, sinus pressure, sinus pain and sore throat.    Eyes:  Negative for pain and visual disturbance.   Respiratory:  Negative for cough, chest tightness, shortness of breath and wheezing.    Cardiovascular:  Negative for chest pain, palpitations and leg swelling.   Gastrointestinal:  Positive for abdominal pain (right sided), diarrhea and nausea. Negative for abdominal distention, anal bleeding, blood in stool, constipation, rectal pain and vomiting.   Endocrine: Negative for cold intolerance and heat intolerance.   Genitourinary:  Negative for decreased urine volume, dysuria and hematuria.   Musculoskeletal:  Negative for arthralgias, back pain and myalgias.   Skin:  Negative for color change and rash.   Allergic/Immunologic: Negative for environmental allergies.   Neurological:  Negative for dizziness, seizures, syncope, weakness, light-headedness, numbness and headaches.   Hematological:  Negative for adenopathy.   Psychiatric/Behavioral:  Negative for confusion. The patient is not nervous/anxious.    All other systems reviewed and are negative.      Objective   LMP 01/09/2025 (Approximate)     Physical Exam  Vitals reviewed: limited due to video visit.   Constitutional:       General: She is not in acute distress.     Appearance: Normal appearance. She is not ill-appearing.   Neurological:       Mental Status: She is alert.         Visit Time  Total Visit Duration: 15 minutes

## 2025-02-10 ENCOUNTER — APPOINTMENT (OUTPATIENT)
Dept: LAB | Facility: HOSPITAL | Age: 30
End: 2025-02-10
Payer: COMMERCIAL

## 2025-02-10 DIAGNOSIS — R73.9 HYPERGLYCEMIA: ICD-10-CM

## 2025-02-10 DIAGNOSIS — E61.1 IRON DEFICIENCY: ICD-10-CM

## 2025-02-10 DIAGNOSIS — R53.83 OTHER FATIGUE: ICD-10-CM

## 2025-02-10 DIAGNOSIS — E55.9 VITAMIN D DEFICIENCY: ICD-10-CM

## 2025-02-10 DIAGNOSIS — Z13.220 SCREENING FOR LIPID DISORDERS: ICD-10-CM

## 2025-02-10 LAB
25(OH)D3 SERPL-MCNC: 9.9 NG/ML (ref 30–100)
ALBUMIN SERPL BCG-MCNC: 4.3 G/DL (ref 3.5–5)
ALP SERPL-CCNC: 83 U/L (ref 34–104)
ALT SERPL W P-5'-P-CCNC: 47 U/L (ref 7–52)
ANION GAP SERPL CALCULATED.3IONS-SCNC: 4 MMOL/L (ref 4–13)
AST SERPL W P-5'-P-CCNC: 28 U/L (ref 13–39)
BILIRUB SERPL-MCNC: 0.37 MG/DL (ref 0.2–1)
BUN SERPL-MCNC: 6 MG/DL (ref 5–25)
CALCIUM SERPL-MCNC: 9.6 MG/DL (ref 8.4–10.2)
CHLORIDE SERPL-SCNC: 106 MMOL/L (ref 96–108)
CHOLEST SERPL-MCNC: 139 MG/DL (ref ?–200)
CO2 SERPL-SCNC: 26 MMOL/L (ref 21–32)
CREAT SERPL-MCNC: 0.63 MG/DL (ref 0.6–1.3)
FERRITIN SERPL-MCNC: 19 NG/ML (ref 11–307)
GFR SERPL CREATININE-BSD FRML MDRD: 119 ML/MIN/1.73SQ M
GLUCOSE SERPL-MCNC: 87 MG/DL (ref 65–140)
HDLC SERPL-MCNC: 51 MG/DL
IRON SATN MFR SERPL: 12 % (ref 15–50)
IRON SERPL-MCNC: 48 UG/DL (ref 50–212)
LDLC SERPL CALC-MCNC: 68 MG/DL (ref 0–100)
NONHDLC SERPL-MCNC: 88 MG/DL
POTASSIUM SERPL-SCNC: 3.9 MMOL/L (ref 3.5–5.3)
PROT SERPL-MCNC: 7.7 G/DL (ref 6.4–8.4)
SODIUM SERPL-SCNC: 136 MMOL/L (ref 135–147)
T4 SERPL-MCNC: 4.46 UG/DL (ref 6.09–12.23)
TIBC SERPL-MCNC: 413 UG/DL (ref 250–450)
TRANSFERRIN SERPL-MCNC: 295 MG/DL (ref 203–362)
TRIGL SERPL-MCNC: 99 MG/DL (ref ?–150)
TSH SERPL DL<=0.05 MIU/L-ACNC: 2.01 UIU/ML (ref 0.45–4.5)
UIBC SERPL-MCNC: 365 UG/DL (ref 155–355)

## 2025-02-10 PROCEDURE — 80053 COMPREHEN METABOLIC PANEL: CPT

## 2025-02-10 PROCEDURE — 80061 LIPID PANEL: CPT

## 2025-02-10 PROCEDURE — 84436 ASSAY OF TOTAL THYROXINE: CPT

## 2025-02-10 PROCEDURE — 36415 COLL VENOUS BLD VENIPUNCTURE: CPT

## 2025-02-10 PROCEDURE — 84443 ASSAY THYROID STIM HORMONE: CPT

## 2025-02-10 PROCEDURE — 83550 IRON BINDING TEST: CPT

## 2025-02-10 PROCEDURE — 82728 ASSAY OF FERRITIN: CPT

## 2025-02-10 PROCEDURE — 83036 HEMOGLOBIN GLYCOSYLATED A1C: CPT

## 2025-02-10 PROCEDURE — 83540 ASSAY OF IRON: CPT

## 2025-02-10 PROCEDURE — 82306 VITAMIN D 25 HYDROXY: CPT

## 2025-02-11 ENCOUNTER — RESULTS FOLLOW-UP (OUTPATIENT)
Dept: FAMILY MEDICINE CLINIC | Facility: CLINIC | Age: 30
End: 2025-02-11

## 2025-02-11 LAB
EST. AVERAGE GLUCOSE BLD GHB EST-MCNC: 117 MG/DL
HBA1C MFR BLD: 5.7 %

## 2025-02-12 ENCOUNTER — OFFICE VISIT (OUTPATIENT)
Dept: FAMILY MEDICINE CLINIC | Facility: CLINIC | Age: 30
End: 2025-02-12
Payer: COMMERCIAL

## 2025-02-12 VITALS
OXYGEN SATURATION: 99 % | HEIGHT: 59 IN | DIASTOLIC BLOOD PRESSURE: 86 MMHG | SYSTOLIC BLOOD PRESSURE: 116 MMHG | BODY MASS INDEX: 32.25 KG/M2 | WEIGHT: 160 LBS | HEART RATE: 88 BPM

## 2025-02-12 DIAGNOSIS — E03.8 SUBCLINICAL HYPOTHYROIDISM: Primary | ICD-10-CM

## 2025-02-12 DIAGNOSIS — R10.11 RIGHT UPPER QUADRANT ABDOMINAL PAIN: ICD-10-CM

## 2025-02-12 DIAGNOSIS — G43.809 OTHER MIGRAINE WITHOUT STATUS MIGRAINOSUS, NOT INTRACTABLE: ICD-10-CM

## 2025-02-12 DIAGNOSIS — E61.1 IRON DEFICIENCY: ICD-10-CM

## 2025-02-12 DIAGNOSIS — J45.20 MILD INTERMITTENT ASTHMA WITHOUT COMPLICATION: ICD-10-CM

## 2025-02-12 DIAGNOSIS — D23.30 CYST, DERMOID, FACE: ICD-10-CM

## 2025-02-12 DIAGNOSIS — F41.9 ANXIETY: ICD-10-CM

## 2025-02-12 DIAGNOSIS — R73.9 HYPERGLYCEMIA: ICD-10-CM

## 2025-02-12 DIAGNOSIS — F32.89 OTHER DEPRESSION: ICD-10-CM

## 2025-02-12 DIAGNOSIS — L30.9 ECZEMA OF BOTH HANDS: ICD-10-CM

## 2025-02-12 DIAGNOSIS — E55.9 VITAMIN D DEFICIENCY: ICD-10-CM

## 2025-02-12 PROBLEM — A08.4 VIRAL GASTROENTERITIS: Status: RESOLVED | Noted: 2025-02-07 | Resolved: 2025-02-12

## 2025-02-12 PROCEDURE — 99214 OFFICE O/P EST MOD 30 MIN: CPT | Performed by: NURSE PRACTITIONER

## 2025-02-12 RX ORDER — LEVOTHYROXINE SODIUM 25 UG/1
25 TABLET ORAL
Qty: 30 TABLET | Refills: 2 | Status: SHIPPED | OUTPATIENT
Start: 2025-02-12

## 2025-02-12 RX ORDER — ERGOCALCIFEROL 1.25 MG/1
50000 CAPSULE, LIQUID FILLED ORAL WEEKLY
Qty: 12 CAPSULE | Refills: 0 | Status: SHIPPED | OUTPATIENT
Start: 2025-02-12 | End: 2025-05-01

## 2025-02-12 RX ORDER — TRIAMCINOLONE ACETONIDE 1 MG/G
CREAM TOPICAL 2 TIMES DAILY
Qty: 45 G | Refills: 1 | Status: SHIPPED | OUTPATIENT
Start: 2025-02-12

## 2025-02-12 RX ORDER — FERROUS SULFATE 324(65)MG
324 TABLET, DELAYED RELEASE (ENTERIC COATED) ORAL
Qty: 30 TABLET | Refills: 5 | Status: SHIPPED | OUTPATIENT
Start: 2025-02-12

## 2025-02-12 NOTE — ASSESSMENT & PLAN NOTE
Dermatology referral was placed that patient can discuss possible cyst removal.  Orders:  •  Ambulatory Referral to Dermatology; Future

## 2025-02-12 NOTE — ASSESSMENT & PLAN NOTE
Referral was placed to psychiatry so that patient can begin talk therapy.  Patient was also provided with other mental health resources in the [facility] on her AVS today.  Orders:  •  Ambulatory referral to Psych Services; Future

## 2025-02-12 NOTE — ASSESSMENT & PLAN NOTE
Patient will be restarted on daily iron supplement.  Repeat iron panel was ordered to be completed in 3 months.  Orders:  •  ferrous sulfate 324 (65 Fe) mg; Take 1 tablet (324 mg total) by mouth daily before breakfast  •  Iron Panel (Includes Ferritin, Iron Sat%, Iron, and TIBC); Future

## 2025-02-12 NOTE — ASSESSMENT & PLAN NOTE
Patient was diagnosed with IBS in the past however, I would like to rule out other causes for her continued abdominal and flank pain.  Lab work and stool studies were ordered to be completed.  GI referral was also placed for further management.  Orders:  •  Pancreatic elastase, fecal; Future  •  Fecal fat, qualitative; Future  •  Calprotectin,Fecal; Future  •  C-reactive protein; Future  •  Sedimentation rate, automated; Future  •  Ambulatory Referral to Gastroenterology; Future

## 2025-02-12 NOTE — PATIENT INSTRUCTIONS
Cindi Baeza   [phone]  [address]  [address]  [place] [address]    [facility]  [address]   [place] [address]  [phone]    [facility]   [address] [facility] [place] [address]  [phone]    [place] [facility]   [phone]  [address]    [address]  [place] [address]     Dominga Davila  [address]  [address]  [place] [address]    [facility]   [address]   [address]  [place] [address]  [phone]    [facility]  [address]   [address]  [place] [address]  [phone]    [facility]   [address]  [address]  [place] [address]  [phone]    [facility]  [address]  [place] [address]  [phone]    [facility]  [address]  [place] [address]  [phone]      [facility]   [address]  [place] [address]   [phone]      [facility]   [address] [facility] [place] [address]  [phone]     [facility]

## 2025-02-12 NOTE — ASSESSMENT & PLAN NOTE
Patient will be trialed on levothyroxine 25 mcg daily for treatment of subclinical hypothyroidism.  Repeat TSH and T4 levels were ordered to be completed in 3 months.  Orders:  •  TSH, 3rd generation; Future  •  T4; Future  •  levothyroxine 25 mcg tablet; Take 1 tablet (25 mcg total) by mouth daily in the early morning

## 2025-02-12 NOTE — ASSESSMENT & PLAN NOTE
Depression Screening Follow-up Plan: Patient's depression screening was positive with a PHQ-9 score of 10.   Referral was placed to psychiatry so that patient can begin talk therapy.  Patient was also provided with other mental health resources in the [facility] on her AVS today.  Orders:  •  Ambulatory referral to Psych Services; Future

## 2025-02-12 NOTE — ASSESSMENT & PLAN NOTE
Patient was advised to continue to limit sugar and carbohydrates in her diet.  I will reassess patient's hemoglobin A1c in the future.

## 2025-02-12 NOTE — ASSESSMENT & PLAN NOTE
Patient was started on a weekly vitamin D supplement for treatment.  Repeat vitamin D level was ordered to be completed in 3 months.  Orders:  •  ergocalciferol (VITAMIN D2) 50,000 units; Take 1 capsule (50,000 Units total) by mouth once a week for 12 doses  •  Vitamin D 25 hydroxy; Future

## 2025-02-12 NOTE — PROGRESS NOTES
Name: María Gusman      : 1995      MRN: [id number]  Encounter Provider: BING Higgins  Encounter Date: 2025   Encounter department: [facility]  :  Assessment & Plan  Subclinical hypothyroidism  Patient will be trialed on levothyroxine 25 mcg daily for treatment of subclinical hypothyroidism.  Repeat TSH and T4 levels were ordered to be completed in 3 months.  Orders:  •  TSH, 3rd generation; Future  •  T4; Future  •  levothyroxine 25 mcg tablet; Take 1 tablet (25 mcg total) by mouth daily in the early morning    Right upper quadrant abdominal pain  Patient was diagnosed with IBS in the past however, I would like to rule out other causes for her continued abdominal and flank pain.  Lab work and stool studies were ordered to be completed.  GI referral was also placed for further management.  Orders:  •  Pancreatic elastase, fecal; Future  •  Fecal fat, qualitative; Future  •  Calprotectin,Fecal; Future  •  C-reactive protein; Future  •  Sedimentation rate, automated; Future  •  Ambulatory Referral to Gastroenterology; Future    Anxiety  Referral was placed to psychiatry so that patient can begin talk therapy.  Patient was also provided with other mental health resources in the [facility] on her AVS today.  Orders:  •  Ambulatory referral to Psych Services; Future    Other depression  Depression Screening Follow-up Plan: Patient's depression screening was positive with a PHQ-9 score of 10.   Referral was placed to psychiatry so that patient can begin talk therapy.  Patient was also provided with other mental health resources in the [facility] on her AVS today.  Orders:  •  Ambulatory referral to Psych Services; Future    Eczema of both hands  Kenalog cream was ordered to be used twice daily on affected areas of the bilateral hands.  I did advise the patient to also use moisturizing cream on the affected areas in between doses of Kenalog.  Orders:  •   triamcinolone (KENALOG) 0.1 % cream; Apply topically 2 (two) times a day    Iron deficiency  Patient will be restarted on daily iron supplement.  Repeat iron panel was ordered to be completed in 3 months.  Orders:  •  ferrous sulfate 324 (65 Fe) mg; Take 1 tablet (324 mg total) by mouth daily before breakfast  •  Iron Panel (Includes Ferritin, Iron Sat%, Iron, and TIBC); Future    Vitamin D deficiency  Patient was started on a weekly vitamin D supplement for treatment.  Repeat vitamin D level was ordered to be completed in 3 months.  Orders:  •  ergocalciferol (VITAMIN D2) 50,000 units; Take 1 capsule (50,000 Units total) by mouth once a week for 12 doses  •  Vitamin D 25 hydroxy; Future    Cyst, dermoid, face  Dermatology referral was placed that patient can discuss possible cyst removal.  Orders:  •  Ambulatory Referral to Dermatology; Future    Other migraine without status migrainosus, not intractable  Well-controlled on current regimen.       Mild intermittent asthma without complication  Well-controlled on current regimen.       Hyperglycemia  Patient was advised to continue to limit sugar and carbohydrates in her diet.  I will reassess patient's hemoglobin A1c in the future.             Depression Screening and Follow-up Plan: Patient's depression screening was positive with a PHQ-9 score of 10.   Patient assessed for underlying major depression. Brief counseling provided and recommend additional follow-up/re-evaluation next office visit.       History of Present Illness   Migraines: Well-controlled with twice daily use of Topamax and as needed use of Fioricet and Zofran.    Asthma: Well-controlled with as needed use of albuterol inhaler.    Depression/anxiety: The patient was previously managed on Cymbalta 20 mg daily however, she recently discontinued the medication.  She is not currently interested in taking medication and would prefer to speak with a counselor regarding her symptoms.  She is currently  experiencing depression and anxiety but she reports that the symptoms are manageable without medication.    Iron deficiency: Patient's most recent iron panel did show a slightly low iron level.    Hyperglycemia: Patient's most recent hemoglobin A1c was 5.7 putting her in the prediabetes range.  Patient denies polydipsia, polyphagia, or polyuria.    Right-sided abdominal pain: Patient was evaluated in the ED on 1/31/2025 due to right-sided abdominal pain.  The patient did have a CT of her abdomen and pelvis completed at that time which showed mild hepatomegaly with fatty infiltration and stool in the right hemicolon.  She is reporting continued symptoms of right sided abdominal pain which radiates into the right flank area.  She also reports alternating between constipation and diarrhea frequently.    Low T4: Patient's most recent T4 level was low however, her TSH level was normal.  She does report noting increased fatigue, weight gain, and cold intolerance.    Vitamin D deficiency: Patient's most recent vitamin D level was very low.    Eczema bilateral hands: Patient reports that over the past few weeks she has been noting dry, flaky skin on her bilateral hands.    Cyst of face: Patient reports that for many years she has had a cyst on the left side of her face anterior to her ear.  She denies any associated pain but she does feel that the area has slightly increased in size over time.  She is interested in speaking with a specialist regarding removal of this area.      Review of Systems   Constitutional:  Positive for fatigue and unexpected weight change (weight gain). Negative for chills and fever.   HENT:  Negative for ear pain and sore throat.    Eyes:  Negative for pain and visual disturbance.   Respiratory:  Negative for cough, chest tightness and shortness of breath.    Cardiovascular:  Negative for chest pain, palpitations and leg swelling.   Gastrointestinal:  Negative for abdominal pain, constipation,  "diarrhea, nausea and vomiting.   Endocrine: Positive for cold intolerance. Negative for heat intolerance, polydipsia, polyphagia and polyuria.   Genitourinary:  Negative for decreased urine volume, dysuria and hematuria.   Musculoskeletal:  Negative for arthralgias, back pain and myalgias.   Skin:  Negative for color change and rash.   Allergic/Immunologic: Negative for environmental allergies.   Neurological:  Negative for dizziness, seizures, syncope, weakness, light-headedness, numbness and headaches.   Hematological:  Negative for adenopathy.   Psychiatric/Behavioral:  Positive for dysphoric mood. Negative for confusion, self-injury, sleep disturbance and suicidal ideas. The patient is nervous/anxious.    All other systems reviewed and are negative.      Objective   /86 (BP Location: Right arm, Patient Position: Sitting, Cuff Size: Standard)   Pulse 88   Ht 4' 11\" (1.499 m)   Wt 72.6 kg (160 lb)   LMP 01/09/2025 (Approximate)   SpO2 99%   BMI 32.32 kg/m²      Physical Exam  Vitals and nursing note reviewed.   Constitutional:       General: She is not in acute distress.     Appearance: Normal appearance. She is well-developed. She is not ill-appearing.   HENT:      Head: Normocephalic.        Comments: Small, nontender, firm, fixed, marble sized cyst noted on the left side of the patient's face anterior to the ear.  Eyes:      Conjunctiva/sclera: Conjunctivae normal.   Cardiovascular:      Rate and Rhythm: Normal rate and regular rhythm.      Pulses: Normal pulses.           Carotid pulses are 2+ on the right side and 2+ on the left side.       Radial pulses are 2+ on the right side and 2+ on the left side.        Posterior tibial pulses are 2+ on the right side and 2+ on the left side.      Heart sounds: Normal heart sounds. No murmur heard.  Pulmonary:      Effort: Pulmonary effort is normal. No respiratory distress.      Breath sounds: Normal breath sounds. No decreased breath sounds, wheezing, " rhonchi or rales.   Abdominal:      General: Abdomen is flat. Bowel sounds are normal. There is no distension.      Palpations: Abdomen is soft.      Tenderness: There is no abdominal tenderness. There is no guarding.   Musculoskeletal:         General: No swelling. Normal range of motion.      Cervical back: Normal range of motion and neck supple.      Right lower leg: No edema.      Left lower leg: No edema.   Skin:     General: Skin is warm and dry.      Capillary Refill: Capillary refill takes less than 2 seconds.      Comments: Dry, flaky skin present on the fingers on the bilateral hands consistent with eczema.   Neurological:      General: No focal deficit present.      Mental Status: She is alert and oriented to person, place, and time.   Psychiatric:         Mood and Affect: Mood normal.         Behavior: Behavior normal.         Thought Content: Thought content normal.         Judgment: Judgment normal.

## 2025-02-26 ENCOUNTER — HOSPITAL ENCOUNTER (EMERGENCY)
Facility: HOSPITAL | Age: 30
Discharge: HOME | End: 2025-02-26
Attending: EMERGENCY MEDICINE
Payer: COMMERCIAL

## 2025-02-26 VITALS
HEART RATE: 113 BPM | DIASTOLIC BLOOD PRESSURE: 58 MMHG | RESPIRATION RATE: 18 BRPM | TEMPERATURE: 99.1 F | WEIGHT: 161.82 LBS | SYSTOLIC BLOOD PRESSURE: 122 MMHG | BODY MASS INDEX: 32.68 KG/M2 | OXYGEN SATURATION: 97 %

## 2025-02-26 DIAGNOSIS — R68.89 FLU-LIKE SYMPTOMS: Primary | ICD-10-CM

## 2025-02-26 DIAGNOSIS — J06.9 URI WITH COUGH AND CONGESTION: ICD-10-CM

## 2025-02-26 LAB
FLUAV AG UPPER RESP QL IA.RAPID: POSITIVE
FLUBV AG UPPER RESP QL IA.RAPID: NEGATIVE
SARS-COV+SARS-COV-2 AG RESP QL IA.RAPID: NEGATIVE

## 2025-02-26 PROCEDURE — 87811 SARS-COV-2 COVID19 W/OPTIC: CPT | Performed by: EMERGENCY MEDICINE

## 2025-02-26 PROCEDURE — 99283 EMERGENCY DEPT VISIT LOW MDM: CPT

## 2025-02-26 PROCEDURE — 87804 INFLUENZA ASSAY W/OPTIC: CPT | Performed by: EMERGENCY MEDICINE

## 2025-02-26 PROCEDURE — 99284 EMERGENCY DEPT VISIT MOD MDM: CPT | Performed by: EMERGENCY MEDICINE

## 2025-02-26 NOTE — ED PROVIDER NOTES
Time reflects when diagnosis was documented in both MDM as applicable and the Disposition within this note       Time User Action Codes Description Comment    2/26/2025  9:06 AM Helena Mc Add [R68.89] Flu-like symptoms     2/26/2025  9:16 AM Helena Mc Add [J06.9] URI with cough and congestion           ED Disposition       ED Disposition   Discharge    Condition   Stable    Date/Time   Wed Feb 26, 2025  9:06 AM    Comment   María Jimenezz discharge to home/self care.                   Assessment & Plan       Medical Decision Making  Flu like symptoms w/ non-focal exam.  Pt requesting viral swab. Recommend symptomatic treatment and given return precautions.    Problems Addressed:  Flu-like symptoms: acute illness or injury with systemic symptoms  URI with cough and congestion: acute illness or injury with systemic symptoms    Amount and/or Complexity of Data Reviewed  External Data Reviewed: notes.     Details: Immunizations reviewed  Labs: ordered.             Medications - No data to display    ED Risk Strat Scores                            SBIRT 22yo+      Flowsheet Row Most Recent Value   Initial Alcohol Screen: US AUDIT-C     1. How often do you have a drink containing alcohol? 0 Filed at: 02/26/2025 0848   2. How many drinks containing alcohol do you have on a typical day you are drinking?  0 Filed at: 02/26/2025 0848   3a. Male UNDER 65: How often do you have five or more drinks on one occasion? 0 Filed at: 02/26/2025 0848   3b. FEMALE Any Age, or MALE 65+: How often do you have 4 or more drinks on one occassion? 0 Filed at: 02/26/2025 0848   Audit-C Score 0 Filed at: 02/26/2025 0848   ERNIE: How many times in the past year have you...    Used an illegal drug or used a prescription medication for non-medical reasons? Never Filed at: 02/26/2025 0848                            History of Present Illness       Chief Complaint   Patient presents with    Flu Symptoms     Patient with cough fever  and headache since Monday.        Past Medical History:   Diagnosis Date    Abnormal Pap smear of cervix 2015 pap negative;    Anxiety     Asthma     Bronchitis 2024    Chlamydia 2014    Costochondritis 2023    Depression     Diabetes mellitus (HCC)     Gestational Diabetes    Fatty liver     GERD (gastroesophageal reflux disease)     Gestational diabetes     Headache(784.0)     Leukocytosis 2024    Migraine     will be seeing an neurologist    Otitis media     Pre-diabetes     Sunburn 2024    Viral gastroenteritis 2025    Visual impairment     Yeast infection 2023      Past Surgical History:   Procedure Laterality Date     SECTION      induced due to GDM, ? ruptured placenta w/ emergency C/S    LYMPHADENECTOMY        Family History   Problem Relation Age of Onset    Cancer Mother 54        Multiple Myeloma    Diabetes Maternal Grandmother     Breast cancer Other     Colon cancer Neg Hx     Ovarian cancer Neg Hx     Uterine cancer Neg Hx       Social History     Tobacco Use    Smoking status: Never    Smokeless tobacco: Never   Vaping Use    Vaping status: Never Used   Substance Use Topics    Alcohol use: Not Currently    Drug use: Never      E-Cigarette/Vaping    E-Cigarette Use Never User       E-Cigarette/Vaping Substances    Nicotine No     THC No     CBD No     Flavoring No     Other No     Unknown No       I have reviewed and agree with the history as documented.     Patient presents with:  Flu Symptoms: Patient with cough fever and headache since Monday.     29y F here for flu like symptoms since Monday.  +F/C/S, +headache, +sinus pressure/congestion, +nasal congestion, no sore throat, +mild cough, no sob/weems, no cp, no abd pain, +nausea, no v/d, +fatigue/malaise, +body aches.  +vaccinated for influenza, no sick contacts.  Taking otc acetaminophen/ibuprofen, mucinex for symptom relief.  Tried going to work today, but felt worse so came in for  evaluation.      History provided by:  Patient   used: No    Flu Symptoms      Review of Systems   All other systems reviewed and are negative.          Objective       ED Triage Vitals [02/26/25 0840]   Temperature Pulse Blood Pressure Respirations SpO2 Patient Position - Orthostatic VS   99.1 °F (37.3 °C) (!) 113 122/58 18 97 % --      Temp Source Heart Rate Source BP Location FiO2 (%) Pain Score    Oral -- -- -- --      Vitals      Date and Time Temp Pulse SpO2 Resp BP Pain Score FACES Pain Rating User   02/26/25 0840 99.1 °F (37.3 °C) 113 97 % 18 122/58 -- -- SL            Physical Exam  Vitals and nursing note reviewed.   Constitutional:       Appearance: Normal appearance. She is ill-appearing (mildly).   HENT:      Nose: Congestion present.   Eyes:      Conjunctiva/sclera: Conjunctivae normal.   Cardiovascular:      Rate and Rhythm: Regular rhythm. Tachycardia present.      Heart sounds: No murmur heard.  Pulmonary:      Effort: Pulmonary effort is normal. No respiratory distress.      Breath sounds: Normal breath sounds. No stridor. No wheezing, rhonchi or rales.   Chest:      Chest wall: No tenderness.   Musculoskeletal:      Cervical back: Normal range of motion.   Skin:     General: Skin is warm.      Capillary Refill: Capillary refill takes less than 2 seconds.   Neurological:      General: No focal deficit present.      Mental Status: She is alert.         Results Reviewed       Procedure Component Value Units Date/Time    FLU/COVID Rapid Antigen (30 min. TAT) - Preferred screening test in ED [[id number]] Collected: 02/26/25 0909    Lab Status: In process Specimen: Nares from Nose Updated: 02/26/25 0911            No orders to display       Procedures    ED Medication and Procedure Management   Prior to Admission Medications   Prescriptions Last Dose Informant Patient Reported? Taking?   albuterol (Proventil HFA) 90 mcg/act inhaler   No No   Sig: Inhale 2 puffs every 6 (six) hours as  needed for wheezing   butalbital-acetaminophen-caffeine (FIORICET,ESGIC) -40 mg per tablet   No No   Sig: TAKE 1 TABLET BY MOUTH EVERY 4 (FOUR) HOURS AS NEEDED FOR HEADACHES OR MIGRAINE   dicyclomine (BENTYL) 10 mg capsule   No No   Sig: Take 1 capsule (10 mg total) by mouth 4 (four) times a day as needed (Abdominal pain)   ergocalciferol (VITAMIN D2) 50,000 units   No No   Sig: Take 1 capsule (50,000 Units total) by mouth once a week for 12 doses   ferrous sulfate 324 (65 Fe) mg   No No   Sig: Take 1 tablet (324 mg total) by mouth daily before breakfast   levothyroxine 25 mcg tablet   No No   Sig: Take 1 tablet (25 mcg total) by mouth daily in the early morning   ondansetron (ZOFRAN-ODT) 4 mg disintegrating tablet   No No   Sig: Take 1 tablet (4 mg total) by mouth every 6 (six) hours as needed for nausea or vomiting   topiramate (TOPAMAX) 50 MG tablet   No No   Sig: TAKE 1 TABLET BY MOUTH TWICE A DAY   triamcinolone (KENALOG) 0.1 % cream   No No   Sig: Apply topically 2 (two) times a day      Facility-Administered Medications: None     Discharge Medication List as of 2/26/2025  9:08 AM        CONTINUE these medications which have NOT CHANGED    Details   albuterol (Proventil HFA) 90 mcg/act inhaler Inhale 2 puffs every 6 (six) hours as needed for wheezing, Starting Tue 12/31/2024, Normal      butalbital-acetaminophen-caffeine (FIORICET,ESGIC) -40 mg per tablet TAKE 1 TABLET BY MOUTH EVERY 4 (FOUR) HOURS AS NEEDED FOR HEADACHES OR MIGRAINE, Starting Thu 1/2/2025, Normal      dicyclomine (BENTYL) 10 mg capsule Take 1 capsule (10 mg total) by mouth 4 (four) times a day as needed (Abdominal pain), Starting Fri 2/7/2025, Normal      ergocalciferol (VITAMIN D2) 50,000 units Take 1 capsule (50,000 Units total) by mouth once a week for 12 doses, Starting Wed 2/12/2025, Until Thu 5/1/2025, Normal      ferrous sulfate 324 (65 Fe) mg Take 1 tablet (324 mg total) by mouth daily before breakfast, Starting Wed  2/12/2025, Normal      levothyroxine 25 mcg tablet Take 1 tablet (25 mcg total) by mouth daily in the early morning, Starting Wed 2/12/2025, Normal      ondansetron (ZOFRAN-ODT) 4 mg disintegrating tablet Take 1 tablet (4 mg total) by mouth every 6 (six) hours as needed for nausea or vomiting, Starting Fri 2/7/2025, Normal      topiramate (TOPAMAX) 50 MG tablet TAKE 1 TABLET BY MOUTH TWICE A DAY, Starting Fri 1/24/2025, Normal      triamcinolone (KENALOG) 0.1 % cream Apply topically 2 (two) times a day, Starting Wed 2/12/2025, Normal           No discharge procedures on file.  ED SEPSIS DOCUMENTATION   Time reflects when diagnosis was documented in both MDM as applicable and the Disposition within this note       Time User Action Codes Description Comment    2/26/2025  9:06 AM Helena Mc [R68.89] Flu-like symptoms     2/26/2025  9:16 AM Helena Mc [J06.9] URI with cough and congestion                  Helena Mc,   02/26/25 0917

## 2025-02-26 NOTE — Clinical Note
María Gusman was seen and treated in our emergency department on 2/26/2025.                Diagnosis:     María  .    She may return on this date: 03/03/2025         If you have any questions or concerns, please don't hesitate to call.      Helena Mc, DO    ______________________________           _______________          _______________  Hospital Representative                              Date                                Time

## 2025-02-26 NOTE — DISCHARGE INSTRUCTIONS
You can have fever for 3-5 days with a viral illness and then any additional symptoms that develop (congestion,cough, nausea, diarrhea) can last for another 7 days.  You can use mucinex/robitussin for cough.      You can alternate acetaminophen and ibuprofen every three hours as needed for the fever, headache and body aches.  Drink plenty of fluids and rest.      Return for any persistent vomiting, trouble breathing or for any concerns.

## 2025-02-27 ENCOUNTER — TELEPHONE (OUTPATIENT)
Age: 30
End: 2025-02-27

## 2025-02-27 ENCOUNTER — VBI (OUTPATIENT)
Dept: FAMILY MEDICINE CLINIC | Facility: CLINIC | Age: 30
End: 2025-02-27

## 2025-02-27 ENCOUNTER — RESULTS FOLLOW-UP (OUTPATIENT)
Dept: EMERGENCY DEPT | Facility: HOSPITAL | Age: 30
End: 2025-02-27

## 2025-02-27 NOTE — LETTER
[facility] Behavioral Health Resource Guide    Dear valued patient - It is the policy and practice of [facility] to provide quality care that includes a comprehensive discharge plan. Every patient admitted to one of our Behavioral Health Units is assigned a Case   Manager who completes a thorough assessment, discusses your aftercare needs, and works with you to implement   a plan that meets your needs.     This is a process that takes time and our Case Management department begins this process immediately upon admission and when patients are able to participate in the process. We understand that there are times when a   patient desires or requires to be discharged before this process can be completed. As we are dedicated to promoting   continued care and treatment, we have compiled this resource guide to assist you with obtaining the necessary   follow-up that you may need. Your physician and/or nursing team will provide you with the appropriate list(s)   and recommendations for care.     Please make sure to schedule an appointment with your Primary Care Physician as soon as their office is open.      If you have an ICM, , or ACT team, please notify them prior to your discharge or as soon as   you return home.     If you already have a Psychiatrist and/or therapist, please call them immediately to schedule a follow-up   Appointment.      Please make sure to inform any and all of your providers that you were hospitalized so that they may request   records for continuity of care.      If you do not have any providers, please refer to the lists provided to you to arrange appointments.     Thank you for choosing [facility] for your care.    Best wishes,  The Behavioral Health Case Management Team          Substance Abuse Resources - If you or someone you care about struggle with substance abuse, there is help:  [facility]: There is a [facility] Drug and Alcohol  Office that you can call 8:00 a.m. till 4:00 p.m. to [phone]        or email [email or web address], 24 hours a day, that can help  walk you through your options for getting help.     [facility]: [phone]      [facility]: [phone] or [phone] after hours    [facility]: [phone]    [facility]: [phone]    [facility]: [phone]    If you do not have health insurance and are in financial need, these offices may also be able help you in accessing funding for services.    If you have health insurance, including medical assistance, there should be a phone number on your insurance card that you can all to find out how to access services. The card may say, “For Behavioral Services” or “For Drug and Alcohol Services” or “For Substance Abuse Services” call the number provided.     If you still need help, you can call the [place] Department of Drug and Alcohol Programs: [phone].    National Suicide Prevention Hotline - 879.400.2302   Nationwide Crisis Intervention - [phone]  Alcoholics Anonymous - [phone]  Narcotics Anonymous - [phone]      [facility] Crisis and Mental Health Services    [facility]: [phone]  [facility]: [phone] / [phone] (302, business hours)  [phone] (off hours)  [facility]: [phone] /  [phone] (D&A) /  [phone] (Crisis)  [facility]: [phone] (Crisis) /  [phone] (MR)  [facility]: [phone] /  [phone] (Crisis)  [facility]: [phone] (Crisis) /  [phone] (MH/MR)  [facility]: [phone] /  [phone] (Crisis)    Food Garg/ Resources    Second harvest (Food Bank Lists)  [phone]    [facility]      Food box = $30     [phone] - [place]  1 Box = Family of 4 for - 1 week   [phone] - [place]  1 Box = 1 senior citizen - 1 month   [phone] - [place]  Food stamps accepted    [phone] - [place]  No  applications/qualifications   [phone] - [place]  No purchase limits for boxes   [phone] - [place]  [facility] locations    [phone] - [place]      Shelters (if you are homeless, please call 211 and register yourself for emergency shelter)    [facility] (Adult men only)  [address] [place] [address]  [phone] / [phone] (after hours)    [facility] (Adult men and women only)  [address] [place] [address]  [phone]    [facility] (85 spots, M/W/C)  [address] [place] [address] [phone]    [facility] (144 spots, M/W/C)  [address] [address]  [phone]    [facility] (W/C, boys 5 or under)  [address] [place], [place] [address] [phone]                      Outpatient Therapists, Psychiatrists, and Partial Programs    [facility] Partial Program / [facility] (Partial)  [facility]  [address] [place] [address]     or  [address] [place] [address] [phone]    [facility] Intake and Referral Line (Outpatient)  [address] [place] [address]  [phone]    [facility] (Dr Azul) (Outpatient Psych)  [address] [address]  [phone]    [facility] (Outpatient)  [address]  [address]  [phone]    [facility] (Outpatient *No Psychiatrist, Counseling only)  [address] [place] [address]  [phone]    [facility] (Outpatient)  [address] [place]  [address]   [phone]    [facility] (Outpatient)  [address] [place] [address]  [phone]    [facility] (Outpatient)  [address] [facility]  [phone]    [facility] (Adolescents - Psychologist / eating  disorders)  [address] [facility] 8[address] [place]  [phone]           Outpatient Therapists, Psychiatrists, and Partial Programs     [facility] (Outpatient D&A)  [address] [place] [address]  [phone]    [facility] (Children, Adolescents, Adults)   O/P MH Therapy ([place] / Medicare / private)  [address] [address] [phone]    [facility] (Children, Adolescents, Adults)  O/P MH Services ([place] / Medicare / Private)  [address] [place] [address]  [phone]    [facility] (Outpatient)  [address] [place] [address]  [phone]    [facility] (Outpatient)  [address] [place] [address]  [phone]     [facility]  [address] [place] [address]  [phone]    [facility] (Outpatient)  [address] [facility] [address], [place] [address]  [phone]    [facility] (Outpatient)  [address] [place] [address]  [phone]    [facility] (Outpatient)  [address] [place] [address] [phone]    [facility] (Outpatient)   [address] [place] [address]  [phone]          Outpatient Therapists, Psychiatrists, and Partial Programs    [facility] (Outpatient - Uninsured)  [address] [address])  [phone]    [facility] (Outpatient)  [address], [place]  [phone]    [facility] () (Outpatient)  [address], [place], [place]  [phone]    [facility] Adult Partial Transitions (Partial)  [address] [facility] [address], [place] [address]  [phone] or [phone]    [facility] (Outpatient)  [address] [place], [place] [id number] [phone]    [facility] (Outpatient)  [address] [place] [address]  [phone]    [facility] (Adults)  (OP Therapy, PTSD /Substance abuse) (MA/Medicare/Private)  [address], [place] [address]  [phone]    [facility] (Outpatient)  [address] [place] [address]  [phone]    [facility] (Outpatient - Uninsured)  [address] [facility] [place], [address]  [phone]    [facility] (Outpatient)  [address] [place] [address]  [phone]    [facility] (Outpatient)  [address] [facility] [place] [address]  [phone]    Outpatient Therapists, Psychiatrists, and Partial Programs    [facility]  [address] [place] [address]  [phone]    [facility] (Private Ins/[place]. No Psychiatrists) (Children/Adolescent/Adult)  [address] [place] [address]  [phone]    [facility] (Outpatient, [place])  [address] [place], [place]  [phone]    [facility] (Outpatient, [place])  [address] [place], [place]  [phone]    [facility] (Outpatient, [place])  [address] [place], [place]  [phone]    [facility] (Kids/Adults) Walk-Ins Available (Outpatient)  [address] [facility], [address], [place] [address]  [phone]    [facility] (Outpatient / Partial) (Uninsured, Medicaid or Private)  [address] [place], [place], [address] [phone]    [facility] (Children, Adolescent, Adult) O/P MH Therapy)  [address], [place] [address] [phone]    [facility] (Children, Adolescent, Adult) O/P MH Services ([place], Medicare, Private) (No [place]BS of [place])  [address] [place], [place], [address] [phone]    [facility] (Outpatient)  [address] [place]  [place], [address]  [phone]    Outpatient  Therapists, Psychiatrists, and Partial Programs    [facility] (Outpatient)  [address] [place] [address]  [phone]    [facility] (Inpatient and Outpatient)  [address] [facility] [address], [place] [address]  [phone]    [facility] (Children 5+, Adolescent, Adult)   (O/P, MH Services - [place] only)  [address] [facility], [address], [place] [address]  [phone]    [facility] (Children 5+, Adolescent, Adult)  (O/P MH Services/Psych?Rehab/Peer Supports) ([place] Only)  [address] [facility], 2nd Floor, [place], [place]  [phone]    [facility] (MH/MR Outpatient)  [address] [place], [place], [address]  [phone] or [phone]    [facility] (Outpatient)  [address] [facility], [place], [address]  [phone]    [facility] (Outpatient & Behavioral Services)  [address] [place] [address]  [phone]    [facility] (Outpatient Therapy) ([place], Medicare, Private Ins)  [address] [facility], [address], [place] [address]  [phone]                      [facility] Department of Human [facility] ([facility] Mental Health Board)  [address] [place] [address] [phone]    [facility] (Integrated Case Management Services)  [address] [phone]    [facility] (Outpatient/Partial Care)  [address] [place] [phone]    [facility] at [place] for [facility] Services (Intensive Outpatient Treatment and Support Services)  [address] [place], [place] [id number] [phone]    [facility] Services (System Advocacy)  [address] [place], [place] [address] [phone]    [facility] (Community Support Services)  [address] [id number] [phone]                                        [facility]  [facility] Services ([facility] Mental Health Board)  [address] [place] [address], [address]  [phone]    [facility] (Integrated Case Management Services)  [facility] [place] [address]  [phone]    [facility]  [address] [place] [address]  [phone]    [facility] (Intensive Outpatient Treatment and Support Services)  [address] [place] [phone]    [facility]al Health (Outpatient/Partial Care)  [address] [place] [address] [phone] or [phone]

## 2025-02-27 NOTE — TELEPHONE ENCOUNTER
02/27/25 10:50 AM    Patient contacted post ED visit, [facility] department spoke with patient/caregiver and outreach was successful.    Thank you.  Lorenzo Flood MA  [facility]

## 2025-02-27 NOTE — TELEPHONE ENCOUNTER
Contacted patient in regards to ROUTINE Referral. Adding patient to [facility]. Sending outside resource packet via [employer].

## 2025-03-08 DIAGNOSIS — E61.1 IRON DEFICIENCY: ICD-10-CM

## 2025-03-10 RX ORDER — FERROUS SULFATE 324(65)MG
324 TABLET, DELAYED RELEASE (ENTERIC COATED) ORAL
Qty: 90 TABLET | Refills: 2 | Status: SHIPPED | OUTPATIENT
Start: 2025-03-10

## 2025-03-12 ENCOUNTER — TELEPHONE (OUTPATIENT)
Dept: FAMILY MEDICINE CLINIC | Facility: CLINIC | Age: 30
End: 2025-03-12

## 2025-04-26 DIAGNOSIS — G43.809 OTHER MIGRAINE WITHOUT STATUS MIGRAINOSUS, NOT INTRACTABLE: ICD-10-CM

## 2025-04-28 RX ORDER — TOPIRAMATE 50 MG/1
50 TABLET, FILM COATED ORAL 2 TIMES DAILY
Qty: 180 TABLET | Refills: 1 | Status: SHIPPED | OUTPATIENT
Start: 2025-04-28

## 2025-05-05 DIAGNOSIS — E61.1 IRON DEFICIENCY: ICD-10-CM

## 2025-05-05 DIAGNOSIS — E03.8 SUBCLINICAL HYPOTHYROIDISM: ICD-10-CM

## 2025-05-05 DIAGNOSIS — G43.809 OTHER MIGRAINE WITHOUT STATUS MIGRAINOSUS, NOT INTRACTABLE: ICD-10-CM

## 2025-05-05 DIAGNOSIS — E55.9 VITAMIN D DEFICIENCY: ICD-10-CM

## 2025-05-05 DIAGNOSIS — A08.4 VIRAL GASTROENTERITIS: ICD-10-CM

## 2025-05-06 RX ORDER — LEVOTHYROXINE SODIUM 25 UG/1
25 TABLET ORAL
Qty: 90 TABLET | Refills: 1 | Status: SHIPPED | OUTPATIENT
Start: 2025-05-06

## 2025-05-06 RX ORDER — FERROUS SULFATE 324(65)MG
324 TABLET, DELAYED RELEASE (ENTERIC COATED) ORAL
Qty: 90 TABLET | Refills: 1 | Status: SHIPPED | OUTPATIENT
Start: 2025-05-06

## 2025-05-06 RX ORDER — ERGOCALCIFEROL 1.25 MG/1
50000 CAPSULE, LIQUID FILLED ORAL WEEKLY
Qty: 12 CAPSULE | Refills: 0 | Status: SHIPPED | OUTPATIENT
Start: 2025-05-06 | End: 2025-07-23

## 2025-05-06 RX ORDER — TOPIRAMATE 50 MG/1
50 TABLET, FILM COATED ORAL 2 TIMES DAILY
Qty: 180 TABLET | Refills: 1 | Status: SHIPPED | OUTPATIENT
Start: 2025-05-06

## 2025-05-06 RX ORDER — DICYCLOMINE HYDROCHLORIDE 10 MG/1
10 CAPSULE ORAL 4 TIMES DAILY PRN
Qty: 30 CAPSULE | Refills: 1 | Status: SHIPPED | OUTPATIENT
Start: 2025-05-06

## 2025-05-06 RX ORDER — BUTALBITAL, ACETAMINOPHEN AND CAFFEINE 50; 325; 40 MG/1; MG/1; MG/1
1 TABLET ORAL EVERY 4 HOURS PRN
Qty: 30 TABLET | Refills: 0 | Status: SHIPPED | OUTPATIENT
Start: 2025-05-06

## 2025-05-06 NOTE — TELEPHONE ENCOUNTER
Requested Prescriptions     Pending Prescriptions Disp Refills    butalbital-acetaminophen-caffeine (FIORICET,ESGIC) -40 mg per tablet 30 tablet 0     Sig: Take 1 tablet by mouth every 4 (four) hours as needed for headaches or migraine    dicyclomine (BENTYL) 10 mg capsule 30 capsule 0     Sig: Take 1 capsule (10 mg total) by mouth 4 (four) times a day as needed (Abdominal pain)    levothyroxine 25 mcg tablet 30 tablet 0     Sig: Take 1 tablet (25 mcg total) by mouth daily in the early morning    ergocalciferol (VITAMIN D2) 50,000 units 12 capsule 0     Sig: Take 1 capsule (50,000 Units total) by mouth once a week for 12 doses    ferrous sulfate 324 (65 Fe) mg 90 tablet 0     Sig: Take 1 tablet (324 mg total) by mouth daily before breakfast    topiramate (TOPAMAX) 50 MG tablet 180 tablet 0     Sig: Take 1 tablet (50 mg total) by mouth 2 (two) times a day

## 2025-05-09 ENCOUNTER — TELEMEDICINE (OUTPATIENT)
Dept: FAMILY MEDICINE CLINIC | Facility: CLINIC | Age: 30
End: 2025-05-09
Payer: COMMERCIAL

## 2025-05-09 DIAGNOSIS — S29.012A STRAIN OF THORACIC PARASPINAL MUSCLES EXCLUDING T1 AND T2 LEVELS, INITIAL ENCOUNTER: Primary | ICD-10-CM

## 2025-05-09 DIAGNOSIS — J45.20 MILD INTERMITTENT ASTHMA WITHOUT COMPLICATION: ICD-10-CM

## 2025-05-09 PROCEDURE — 99214 OFFICE O/P EST MOD 30 MIN: CPT | Performed by: NURSE PRACTITIONER

## 2025-05-09 RX ORDER — METHOCARBAMOL 500 MG/1
500 TABLET, FILM COATED ORAL 3 TIMES DAILY PRN
Qty: 30 TABLET | Refills: 0 | Status: SHIPPED | OUTPATIENT
Start: 2025-05-09

## 2025-05-09 RX ORDER — METHYLPREDNISOLONE 4 MG/1
TABLET ORAL
Qty: 21 EACH | Refills: 0 | Status: SHIPPED | OUTPATIENT
Start: 2025-05-09

## 2025-05-09 NOTE — ASSESSMENT & PLAN NOTE
Patient's symptoms are consistent with thoracic muscle strain.  Medrol Dosepak was ordered to help with acute inflammation and pain.  Robaxin 500 mg was ordered to be used 3 times daily as needed for muscle spasms.  Orders:  •  methylPREDNISolone 4 MG tablet therapy pack; Use as directed on package  •  methocarbamol (ROBAXIN) 500 mg tablet; Take 1 tablet (500 mg total) by mouth 3 (three) times a day as needed for muscle spasms

## 2025-05-09 NOTE — LETTER
May 9, 2025     Patient: María Gusman  YOB: 1995  Date of Visit: 5/9/2025      To Whom it May Concern:    María Gusman is under my professional care. María was seen in my office on 5/9/2025. María is excused from work from 5/8/2025-5/9/2025 as she was being treated for a medical condition.  She may return to work on 5/12/2025.    If you have any questions or concerns, please don't hesitate to call.         Sincerely,          BING Higgins        CC: No Recipients

## 2025-05-09 NOTE — PROGRESS NOTES
Virtual Regular VisitName: María Gusman      : 1995      MRN: [id number]  Encounter Provider: BING Higgins  Encounter Date: 2025   Encounter department: [facility] PRIMARY CARE  :  Assessment & Plan  Strain of thoracic paraspinal muscles excluding T1 and T2 levels, initial encounter  Patient's symptoms are consistent with thoracic muscle strain.  Medrol Dosepak was ordered to help with acute inflammation and pain.  Robaxin 500 mg was ordered to be used 3 times daily as needed for muscle spasms.  Orders:  •  methylPREDNISolone 4 MG tablet therapy pack; Use as directed on package  •  methocarbamol (ROBAXIN) 500 mg tablet; Take 1 tablet (500 mg total) by mouth 3 (three) times a day as needed for muscle spasms    Mild intermittent asthma without complication  Well-controlled with as needed use of albuterol inhaler.           History of Present Illness     Back pain: Patient reports over the past week she has been experiencing recurrent pain of the upper back.  She reports that the pain is localized to the midline thoracic area.  She denies any radiation of the pain or associated numbness or paresthesias.  She also denies any recent falls or known injuries to the affected area.  She has been using lidocaine patches and NSAIDs with mild improvement of her symptoms.    Asthma: Well-controlled with as needed use of albuterol inhaler.  Patient reports rarely needing to use her inhaler.      Review of Systems   Constitutional:  Negative for chills and fever.   HENT:  Negative for ear pain and sore throat.    Eyes:  Negative for pain and visual disturbance.   Respiratory:  Negative for cough, chest tightness, shortness of breath and wheezing.    Cardiovascular:  Negative for chest pain, palpitations and leg swelling.   Gastrointestinal:  Negative for abdominal pain, constipation, diarrhea, nausea and vomiting.   Endocrine: Negative for cold intolerance and heat intolerance.   Genitourinary:   Negative for decreased urine volume, dysuria and hematuria.   Musculoskeletal:  Positive for back pain (thoracic). Negative for arthralgias, gait problem, joint swelling, myalgias, neck pain and neck stiffness.   Skin:  Negative for color change and rash.   Allergic/Immunologic: Negative for environmental allergies.   Neurological:  Negative for dizziness, seizures, syncope, weakness, light-headedness, numbness and headaches.   Hematological:  Negative for adenopathy.   Psychiatric/Behavioral:  Negative for confusion. The patient is not nervous/anxious.    All other systems reviewed and are negative.      Objective   There were no vitals taken for this visit.    Physical Exam  Vitals reviewed: limited due to video visit.   Constitutional:       General: She is not in acute distress.     Appearance: Normal appearance. She is not ill-appearing.   Neurological:      Mental Status: She is alert.         Administrative Statements   Encounter provider BING Higigns    The Patient is located at Home and in the following state in which I hold an active license [place].    The patient was identified by name and date of birth. María Gusman was informed that this is a telemedicine visit and that the visit is being conducted through the Epic Embedded platform. She agrees to proceed..  My office door was closed. No one else was in the room.  She acknowledged consent and understanding of privacy and security of the video platform. The patient has agreed to participate and understands they can discontinue the visit at any time.    I have spent a total time of 15 minutes in caring for this patient on the day of the visit/encounter including Diagnostic results, Prognosis, Risks and benefits of tx options, Instructions for management, Patient and family education, Importance of tx compliance, Risk factor reductions, Impressions, Counseling / Coordination of care, Documenting in the medical record, Reviewing/placing orders  in the medical record (including tests, medications, and/or procedures), and Obtaining or reviewing history  , not including the time spent for establishing the audio/video connection.

## 2025-05-21 ENCOUNTER — TELEPHONE (OUTPATIENT)
Age: 30
End: 2025-05-21

## 2025-05-21 NOTE — TELEPHONE ENCOUNTER
"Contacted patient off of Talk Therapy  wait list to verify needs of services in attempts to update list with patient preferences. Line was connected with verbal \"hello\" but after writer introduced themselves line was disconnected not by writer.  "

## 2025-05-28 ENCOUNTER — TELEPHONE (OUTPATIENT)
Dept: BARIATRICS | Facility: CLINIC | Age: 30
End: 2025-05-28

## 2025-06-11 ENCOUNTER — TELEMEDICINE (OUTPATIENT)
Dept: FAMILY MEDICINE CLINIC | Facility: CLINIC | Age: 30
End: 2025-06-11
Payer: COMMERCIAL

## 2025-06-11 DIAGNOSIS — F41.9 ANXIETY: Primary | ICD-10-CM

## 2025-06-11 DIAGNOSIS — E03.8 SUBCLINICAL HYPOTHYROIDISM: ICD-10-CM

## 2025-06-11 PROCEDURE — 99214 OFFICE O/P EST MOD 30 MIN: CPT | Performed by: NURSE PRACTITIONER

## 2025-06-11 RX ORDER — HYDROXYZINE HYDROCHLORIDE 25 MG/1
25 TABLET, FILM COATED ORAL EVERY 6 HOURS PRN
Qty: 30 TABLET | Refills: 0 | Status: SHIPPED | OUTPATIENT
Start: 2025-06-11

## 2025-06-11 RX ORDER — ESCITALOPRAM OXALATE 10 MG/1
10 TABLET ORAL DAILY
Qty: 30 TABLET | Refills: 2 | Status: SHIPPED | OUTPATIENT
Start: 2025-06-11

## 2025-06-11 NOTE — PROGRESS NOTES
Virtual Regular Visit  Name: María Gusman      : 1995      MRN: [id number]  Encounter Provider: BING Higgins  Encounter Date: 2025   Encounter department: [facility] PRIMARY CARE  :  Assessment & Plan  Anxiety  Patient will be started on Lexapro 10 mg daily for treatment of anxiety.  I did advise the patient that it can take 2 to 4 weeks to see true effect from the medication so she was also prescribed hydroxyzine 25 mg to be used every 6 hours as needed for anxiety or panic attacks.  I will reassess patient's symptoms at her next office visit.  Orders:  •  escitalopram (LEXAPRO) 10 mg tablet; Take 1 tablet (10 mg total) by mouth daily  •  hydrOXYzine HCL (ATARAX) 25 mg tablet; Take 1 tablet (25 mg total) by mouth every 6 (six) hours as needed for anxiety    Subclinical hypothyroidism  Well-controlled on current regimen.           History of Present Illness     Anxiety: Patient reports that recently she has been experiencing increased anxiety.  She does report that she has been experiencing anxiety most days of the week with associated panic attacks.  She is interested in trialing medication for her symptoms.    Subclinical hypothyroidism: Patient was recently started on levothyroxine 25 mcg daily for treatment of this condition.  She does report that since beginning the medication she has noted that her fatigue has improved.      Review of Systems   Constitutional:  Negative for chills and fever.   HENT:  Negative for ear pain and sore throat.    Eyes:  Negative for pain and visual disturbance.   Respiratory:  Negative for cough, chest tightness, shortness of breath and wheezing.    Cardiovascular:  Negative for chest pain, palpitations and leg swelling.   Gastrointestinal:  Negative for abdominal pain, constipation, diarrhea, nausea and vomiting.   Endocrine: Negative for cold intolerance and heat intolerance.   Genitourinary:  Negative for decreased urine volume, dysuria and  hematuria.   Musculoskeletal:  Negative for arthralgias, back pain and myalgias.   Skin:  Negative for color change and rash.   Allergic/Immunologic: Negative for environmental allergies.   Neurological:  Negative for dizziness, seizures, syncope, weakness, light-headedness, numbness and headaches.   Hematological:  Negative for adenopathy.   Psychiatric/Behavioral:  Negative for confusion, dysphoric mood, self-injury, sleep disturbance and suicidal ideas. The patient is nervous/anxious.         Labile mood   All other systems reviewed and are negative.      Objective   There were no vitals taken for this visit.    Physical Exam  Vitals reviewed: limited due to video visit.   Constitutional:       General: She is not in acute distress.     Appearance: Normal appearance. She is not ill-appearing.     Neurological:      Mental Status: She is alert.         Administrative Statements   Encounter provider BING Higgins    The Patient is located at Home and in the following state in which I hold an active license [place].    The patient was identified by name and date of birth. María Gusman was informed that this is a telemedicine visit and that the visit is being conducted through the Epic Embedded platform. She agrees to proceed..  My office door was closed. No one else was in the room.  She acknowledged consent and understanding of privacy and security of the video platform. The patient has agreed to participate and understands they can discontinue the visit at any time.    I have spent a total time of 20 minutes in caring for this patient on the day of the visit/encounter including Diagnostic results, Prognosis, Risks and benefits of tx options, Instructions for management, Patient and family education, Importance of tx compliance, Risk factor reductions, Impressions, Counseling / Coordination of care, Documenting in the medical record, Reviewing/placing orders in the medical record (including tests,  medications, and/or procedures), and Obtaining or reviewing history  , not including the time spent for establishing the audio/video connection.

## 2025-06-11 NOTE — ASSESSMENT & PLAN NOTE
Patient will be started on Lexapro 10 mg daily for treatment of anxiety.  I did advise the patient that it can take 2 to 4 weeks to see true effect from the medication so she was also prescribed hydroxyzine 25 mg to be used every 6 hours as needed for anxiety or panic attacks.  I will reassess patient's symptoms at her next office visit.  Orders:  •  escitalopram (LEXAPRO) 10 mg tablet; Take 1 tablet (10 mg total) by mouth daily  •  hydrOXYzine HCL (ATARAX) 25 mg tablet; Take 1 tablet (25 mg total) by mouth every 6 (six) hours as needed for anxiety

## 2025-06-16 ENCOUNTER — TELEPHONE (OUTPATIENT)
Age: 30
End: 2025-06-16

## 2025-06-16 NOTE — TELEPHONE ENCOUNTER
Pt called in to be scheduled for migraines.   np / migraines / [facility]     pt is taking topimax 50 mg daily and fiociate as needed. Zofran for nausea. (this is currently working)       08/05/2025 @ 100pm nikole brennan

## 2025-06-20 ENCOUNTER — NURSE TRIAGE (OUTPATIENT)
Age: 30
End: 2025-06-20

## 2025-06-20 NOTE — TELEPHONE ENCOUNTER
"REASON FOR CONVERSATION: Vaginal Bleeding    SYMPTOMS: vaginal bleeding, cramping, clots     OTHER HEALTH INFORMATION: Pt calling in saying that she's having vaginal bleeding for a month.  Pt bled through one pad overnight with a clot the size of a quarter in size. Pt has Cramping pain 6/10 aching pain. Pt saying that she started taking levothyroxine medication 3 weeks ago and pt is unsure if this is due to the medication.     PROTOCOL DISPOSITION: Go to [facility]    CARE ADVICE PROVIDED: Pt is provided care advice, and is notified when to call back, pt verbalized understanding.        PRACTICE FOLLOW-UP:   Pt is offered two appts today, pt declined due to working hours. Pt is requesting an end of day appt, scott Joyce as per Maria Del Carmen, no end of day appts available, pt is notified and scheduled for Monday with Dr Keith.           Reason for Disposition   Periods last > 7 days    Answer Assessment - Initial Assessment Questions  1. BLEEDING SEVERITY: \"Describe the bleeding that you are having.\" \"How much bleeding is there?\"       Pt bled through one pad overnight with a clot the size of a quarter in size.   2. ONSET: \"When did the bleeding begin?\" \"Is it continuing now?\"      A month ago   3. MENSTRUAL PERIOD: \"When was the last normal menstrual period?\" \"How is this different than your period?\"      LMP last month   4. REGULARITY: \"How regular are your periods?\"      Regular   5. ABDOMEN PAIN: \"Do you have any pain?\" \"How bad is the pain?\"  (e.g., Scale 0-10; none, mild, moderate, or severe)      Cramping pain 6/10 aching pain.   6. PREGNANCY: \"Is there any chance you are pregnant?\" \"When was your last menstrual period?\"      Pt denies pregnancy     8. HORMONE MEDICINES: \"Are you taking any hormone medicines, prescription or over-the-counter?\" (e.g., birth control pills, estrogen)      3 weeks ago started taking thyroid medication   9. BLOOD THINNER MEDICINES: \"Do you take any blood thinners?\" " "(e.g., Coumadin / warfarin, Pradaxa / dabigatran, aspirin)      Pt denies   10. CAUSE: \"What do you think is causing the bleeding?\" (e.g., recent gyn surgery, recent gyn procedure; known bleeding disorder, cervical cancer, polycystic ovarian disease, fibroids)          Pt started taking thyroid medication   11. HEMODYNAMIC STATUS: \"Are you weak or feeling lightheaded?\" If Yes, ask: \"Can you stand and walk normally?\"         Pt denies feeling weak or lightheaded   12. OTHER SYMPTOMS: \"What other symptoms are you having with the bleeding?\" (e.g., passed tissue, vaginal discharge, fever, menstrual-type cramps)        Pt denies  passed tissue, vaginal discharge, fever,    Protocols used: Vaginal Bleeding - Abnormal-Adult-OH    "

## 2025-06-29 ENCOUNTER — HOSPITAL ENCOUNTER (EMERGENCY)
Facility: HOSPITAL | Age: 30
Discharge: HOME | End: 2025-06-29
Attending: EMERGENCY MEDICINE | Admitting: EMERGENCY MEDICINE
Payer: COMMERCIAL

## 2025-06-29 VITALS
SYSTOLIC BLOOD PRESSURE: 125 MMHG | OXYGEN SATURATION: 97 % | DIASTOLIC BLOOD PRESSURE: 83 MMHG | BODY MASS INDEX: 32.82 KG/M2 | RESPIRATION RATE: 20 BRPM | WEIGHT: 162.48 LBS | HEART RATE: 96 BPM | TEMPERATURE: 98.6 F

## 2025-06-29 DIAGNOSIS — N92.1 METRORRHAGIA: Primary | ICD-10-CM

## 2025-06-29 LAB
ALBUMIN SERPL BCG-MCNC: 4 G/DL (ref 3.5–5)
ALP SERPL-CCNC: 85 U/L (ref 34–104)
ALT SERPL W P-5'-P-CCNC: 44 U/L (ref 7–52)
ANION GAP SERPL CALCULATED.3IONS-SCNC: 4 MMOL/L (ref 4–13)
AST SERPL W P-5'-P-CCNC: 25 U/L (ref 13–39)
ATRIAL RATE: 90 BPM
BACTERIA UR QL AUTO: ABNORMAL /HPF
BASOPHILS # BLD AUTO: 0.06 THOUSANDS/ÂΜL (ref 0–0.1)
BASOPHILS NFR BLD AUTO: 1 % (ref 0–1)
BILIRUB SERPL-MCNC: 0.49 MG/DL (ref 0.2–1)
BILIRUB UR QL STRIP: NEGATIVE
BUN SERPL-MCNC: 11 MG/DL (ref 5–25)
CALCIUM SERPL-MCNC: 8.9 MG/DL (ref 8.4–10.2)
CARDIAC TROPONIN I PNL SERPL HS: <2 NG/L (ref ?–50)
CHLORIDE SERPL-SCNC: 106 MMOL/L (ref 96–108)
CLARITY UR: CLEAR
CO2 SERPL-SCNC: 27 MMOL/L (ref 21–32)
COLOR UR: YELLOW
CREAT SERPL-MCNC: 0.65 MG/DL (ref 0.6–1.3)
EOSINOPHIL # BLD AUTO: 0.16 THOUSAND/ÂΜL (ref 0–0.61)
EOSINOPHIL NFR BLD AUTO: 2 % (ref 0–6)
ERYTHROCYTE [DISTWIDTH] IN BLOOD BY AUTOMATED COUNT: 13.1 % (ref 11.6–15.1)
EXT PREGNANCY TEST URINE: NEGATIVE
EXT. CONTROL: NORMAL
GFR SERPL CREATININE-BSD FRML MDRD: 117 ML/MIN/1.73SQ M
GLUCOSE SERPL-MCNC: 121 MG/DL (ref 65–140)
GLUCOSE UR STRIP-MCNC: NEGATIVE MG/DL
HCT VFR BLD AUTO: 36 % (ref 34.8–46.1)
HGB BLD-MCNC: 12.3 G/DL (ref 11.5–15.4)
HGB UR QL STRIP.AUTO: ABNORMAL
IMM GRANULOCYTES # BLD AUTO: 0.06 THOUSAND/UL (ref 0–0.2)
IMM GRANULOCYTES NFR BLD AUTO: 1 % (ref 0–2)
KETONES UR STRIP-MCNC: ABNORMAL MG/DL
LEUKOCYTE ESTERASE UR QL STRIP: NEGATIVE
LYMPHOCYTES # BLD AUTO: 2.87 THOUSANDS/ÂΜL (ref 0.6–4.47)
LYMPHOCYTES NFR BLD AUTO: 30 % (ref 14–44)
MCH RBC QN AUTO: 28.1 PG (ref 26.8–34.3)
MCHC RBC AUTO-ENTMCNC: 34.2 G/DL (ref 31.4–37.4)
MCV RBC AUTO: 82 FL (ref 82–98)
MONOCYTES # BLD AUTO: 0.58 THOUSAND/ÂΜL (ref 0.17–1.22)
MONOCYTES NFR BLD AUTO: 6 % (ref 4–12)
MUCOUS THREADS UR QL AUTO: ABNORMAL
NEUTROPHILS # BLD AUTO: 5.91 THOUSANDS/ÂΜL (ref 1.85–7.62)
NEUTS SEG NFR BLD AUTO: 60 % (ref 43–75)
NITRITE UR QL STRIP: NEGATIVE
NON-SQ EPI CELLS URNS QL MICRO: ABNORMAL /HPF
NRBC BLD AUTO-RTO: 0 /100 WBCS
P AXIS: 46 DEGREES
PH UR STRIP.AUTO: 5 [PH]
PLATELET # BLD AUTO: 281 THOUSANDS/UL (ref 149–390)
PMV BLD AUTO: 10.2 FL (ref 8.9–12.7)
POTASSIUM SERPL-SCNC: 3.7 MMOL/L (ref 3.5–5.3)
PR INTERVAL: 148 MS
PROT SERPL-MCNC: 7.4 G/DL (ref 6.4–8.4)
PROT UR STRIP-MCNC: ABNORMAL MG/DL
QRS AXIS: 67 DEGREES
QRSD INTERVAL: 76 MS
QT INTERVAL: 336 MS
QTC INTERVAL: 411 MS
RBC # BLD AUTO: 4.38 MILLION/UL (ref 3.81–5.12)
RBC #/AREA URNS AUTO: ABNORMAL /HPF
SODIUM SERPL-SCNC: 137 MMOL/L (ref 135–147)
SP GR UR STRIP.AUTO: >=1.03 (ref 1–1.03)
T WAVE AXIS: 29 DEGREES
UROBILINOGEN UR STRIP-ACNC: 2 MG/DL
VENTRICULAR RATE: 90 BPM
WBC # BLD AUTO: 9.64 THOUSAND/UL (ref 4.31–10.16)
WBC #/AREA URNS AUTO: ABNORMAL /HPF

## 2025-06-29 PROCEDURE — 81025 URINE PREGNANCY TEST: CPT | Performed by: EMERGENCY MEDICINE

## 2025-06-29 PROCEDURE — 85025 COMPLETE CBC W/AUTO DIFF WBC: CPT

## 2025-06-29 PROCEDURE — 99285 EMERGENCY DEPT VISIT HI MDM: CPT | Performed by: EMERGENCY MEDICINE

## 2025-06-29 PROCEDURE — 99284 EMERGENCY DEPT VISIT MOD MDM: CPT

## 2025-06-29 PROCEDURE — 93010 ELECTROCARDIOGRAM REPORT: CPT

## 2025-06-29 PROCEDURE — 81001 URINALYSIS AUTO W/SCOPE: CPT | Performed by: EMERGENCY MEDICINE

## 2025-06-29 PROCEDURE — 93005 ELECTROCARDIOGRAM TRACING: CPT

## 2025-06-29 PROCEDURE — 84484 ASSAY OF TROPONIN QUANT: CPT

## 2025-06-29 PROCEDURE — 36415 COLL VENOUS BLD VENIPUNCTURE: CPT

## 2025-06-29 PROCEDURE — 80053 COMPREHEN METABOLIC PANEL: CPT

## 2025-06-29 RX ORDER — MEDROXYPROGESTERONE ACETATE 5 MG
5 TABLET ORAL DAILY
Qty: 5 TABLET | Refills: 0 | Status: SHIPPED | OUTPATIENT
Start: 2025-06-29 | End: 2025-07-09

## 2025-06-29 NOTE — ED ATTENDING ATTESTATION
6/29/2025  IMary DO, saw and evaluated the patient. I have discussed the patient with the resident/non-physician practitioner and agree with the resident's/non-physician practitioner's findings, Plan of Care, and MDM as documented in the resident's/non-physician practitioner's note, except where noted. All available labs and Radiology studies were reviewed.  I was present for key portions of any procedure(s) performed by the resident/non-physician practitioner and I was immediately available to provide assistance.       At this point I agree with the current assessment done in the Emergency Department.  I have conducted an independent evaluation of this patient a history and physical is as follows:      A 31 yo female with no significant pmhx; presents with vaginal bleeding for the past month.  Bleeding started at the time of her normal menses however has continued.  It has waxed and waned in intensity.  Today it was heavier than previously, although improved at this time.  She did have a few episodes of lightheadedness today as well.  Pt otherwise denies fever, chills, chest pain, SOB, abd pain, N/V/D, peripheral edema and rashes.   Pt has follow up with her OBGYN at the end of July.  She reports recently normal menses.    Physical Exam  General Appearance: alert and oriented, nad, non toxic appearing  Skin:  Warm, dry, intact  HEENT: atraumatic, normocephalic  Neck: Supple, trachea midline  Cardiac: RRR; no murmurs, rub, gallops  Pulmonary: lungs CTAB; no wheezes, rales, rhonchi  Gastrointestinal: abdomen soft, nontender, nondistended; no guarding or rebound tenderness; good bowel sounds, no mass or bruits  Extremities:  no pedal edema, 2+ pulses; no calf tenderness, no clubbing, no cyanosis  Neuro:  no focal motor or sensory deficits, CN 2-12 grossly intact  Psych:  Normal mood and affect, normal judgement and insight    Assessment and Plan:  Vaginal bleeding, ongoing for the past month.  Along with  lightheadedness today.  Will check labs for anemia.  Will check urine for pregnancy.  Discussed treatment options with patient, she is agreeable to proceed with Provera.  Of note, a troponin and EKG was sent by nursing however there is no concern for ACS.       ED Course         Critical Care Time  Procedures

## 2025-06-29 NOTE — ED PROVIDER NOTES
"Time reflects when diagnosis was documented in both MDM as applicable and the Disposition within this note       Time User Action Codes Description Comment    6/29/2025  7:17 PM Galindo Sorenson [N92.1] Metrorrhagia           ED Disposition       ED Disposition   Discharge    Condition   Stable    Date/Time   Sun Jun 29, 2025  7:17 PM    Comment   María Gusman discharge to home/self care.                   Assessment & Plan       Medical Decision Making  Patient is a 30 y.o. female who presents to the ED with vaginal bleeding.    Vital signs show the patient is mildly tachycardic.  Physical exam as above.    Differential diagnosis included but not limited to ectopic pregnancy, pregnancy, polyp, adenomyosis, coagulopathy.     Plan: First nurse orders of UA, urine pregnancy, CBC, CMP, troponin and EKG. The patient is not complaining of chest pain and there is no concern for ACS.     View ED course above for further discussion on patient workup.     On review of previous records, patient contacted OBGYN on 06/20/25 and scheduled an appointment on 07/23/25. Patient was seen by PCP on 02/12/25. Visit note reviewed.    All labs reviewed and utilized in the medical decision making process  All radiology studies independently viewed by me and interpreted by the radiologist.  I reviewed all testing with the patient.     Upon re-evaluation, patient remained stable.    Disposition: Patient discharged in stable condition. Patient given strict return precautions. Patient opted to start Provera for the next 5 days. Patient will also start taking ibuprofen 400mg q6 hours. Patient will keep follow up with OBGYN on July 23.    Portions of the record may have been created with voice recognition software. Occasional wrong word or \"sound a like\" substitutions may have occurred due to the inherent limitations of voice recognition software. Read the chart carefully and recognize, using context, where substitutions have occurred. "     Amount and/or Complexity of Data Reviewed  Labs:  Decision-making details documented in ED Course.  ECG/medicine tests:  Decision-making details documented in ED Course.    Risk  Prescription drug management.        ED Course as of 06/29/25 2027   Sun Jun 29, 2025 1845 Blood Pressure: 125/83   1845 Temperature: 98.6 °F (37 °C)   1845 Pulse(!): 107   1845 Respirations: 18   1845 SpO2: 97 %   1845 PREGNANCY TEST URINE: Negative   1903 CBC and differential  Reassuring, WNL   1908 Comprehensive metabolic panel  Reassuring   1910 ECG 12 lead  Procedure Note: EKG  Date/Time: 06/29/25 7:10 PM   Interpreted by: MARKUS DIAS D.O.  Indications / Diagnosis: Vaginal bleeding  ECG reviewed by me, the ED Provider: yes   The EKG demonstrates: normal EKG, normal sinus rhythm, unchanged from previous tracings  Rhythm: normal sinus  Intervals: normal intervals  Axis: normal axis  QRS/Blocks: normal QRS  ST Changes: No acute ST Changes, no STD/OLIMPIA.    1911 Pulse: 96   1911 hs TnI 0hr: <2  Reassuring, symptoms persistent for >3 hours, no chest pain, no current SOB, will cancel 2/4 hour repeat       Medications - No data to display    ED Risk Strat Scores                    No data recorded        SBIRT 22yo+      Flowsheet Row Most Recent Value   Initial Alcohol Screen: US AUDIT-C     1. How often do you have a drink containing alcohol? 0 Filed at: 06/29/2025 1837   2. How many drinks containing alcohol do you have on a typical day you are drinking?  0 Filed at: 06/29/2025 1837   3a. Male UNDER 65: How often do you have five or more drinks on one occasion? 0 Filed at: 06/29/2025 1837   3b. FEMALE Any Age, or MALE 65+: How often do you have 4 or more drinks on one occassion? 0 Filed at: 06/29/2025 1837   Audit-C Score 0 Filed at: 06/29/2025 1837   ERNIE: How many times in the past year have you...    Used an illegal drug or used a prescription medication for non-medical reasons? Never Filed at: 06/29/2025 1837                             History of Present Illness       Chief Complaint   Patient presents with    Vaginal Bleeding     Pt states that she has been having vaginal bleeding for the last month, states that she has some days where it's light and other days with clots. Unsure about pregnancy status. States that she usually has a normal period 1x a month. C/o heavier bleeding the last couple days, passing clots, feels dizzy.        Past Medical History[1]   Past Surgical History[2]   Family History[3]   Social History[4]   E-Cigarette/Vaping    E-Cigarette Use Never User       E-Cigarette/Vaping Substances    Nicotine No     THC No     CBD No     Flavoring No     Other No     Unknown No       I have reviewed and agree with the history as documented.     Patient is a 30-year-old  female who is presenting for vaginal bleeding.  The patient notes that prior to this month has been having regular menstrual periods.  Patient states that at the start of this month she had her normal period, but never stopped bleeding.  The patient states that since that time she has had daily bleeding.  Patient notes she typically goes through several pads a day.  Patient denies any irregular vaginal discharge.  Patient notes that she has had intermittent cramping with some today.  Patient states that she has called her OB and has an appointment on .  Patient states that she is not on any birth control.  Patient notes that this morning she had episodes of dizziness/lightheadedness, which resolved prior to arrival.  Patient denies any other symptoms.        Review of Systems   Constitutional:  Negative for chills and fever.   HENT:  Negative for ear pain and sore throat.    Eyes:  Negative for pain and visual disturbance.   Respiratory:  Negative for cough and shortness of breath.    Cardiovascular:  Negative for chest pain and palpitations.   Gastrointestinal:  Negative for abdominal pain, constipation, diarrhea, nausea and vomiting.   Genitourinary:   Positive for vaginal bleeding. Negative for dysuria and hematuria.   Musculoskeletal:  Negative for arthralgias and back pain.   Skin:  Negative for color change and rash.   Neurological:  Positive for light-headedness. Negative for seizures and syncope.   All other systems reviewed and are negative.          Objective       ED Triage Vitals [06/29/25 1759]   Temperature Pulse Blood Pressure Respirations SpO2 Patient Position - Orthostatic VS   98.6 °F (37 °C) (!) 107 125/83 18 97 % --      Temp Source Heart Rate Source BP Location FiO2 (%) Pain Score    Oral Monitor -- -- --      Vitals      Date and Time Temp Pulse SpO2 Resp BP Pain Score FACES Pain Rating User   06/29/25 1911 -- 96 -- 20 -- -- -- BRENT   06/29/25 1759 98.6 °F (37 °C) 107 97 % 18 125/83 -- -- AND            Physical Exam  Vitals and nursing note reviewed. Exam conducted with a chaperone present.   Constitutional:       General: She is not in acute distress.     Appearance: Normal appearance. She is well-developed. She is not ill-appearing.   HENT:      Head: Normocephalic and atraumatic.      Nose: Nose normal.      Mouth/Throat:      Mouth: Mucous membranes are moist.      Pharynx: Oropharynx is clear. No oropharyngeal exudate.     Eyes:      Extraocular Movements: Extraocular movements intact.      Conjunctiva/sclera: Conjunctivae normal.      Pupils: Pupils are equal, round, and reactive to light.       Cardiovascular:      Rate and Rhythm: Normal rate and regular rhythm.      Pulses: Normal pulses.      Heart sounds: Normal heart sounds. No murmur heard.     No friction rub. No gallop.   Pulmonary:      Effort: Pulmonary effort is normal. No respiratory distress.      Breath sounds: Normal breath sounds. No stridor. No wheezing, rhonchi or rales.   Abdominal:      General: Abdomen is flat. There is no distension.      Palpations: Abdomen is soft. There is no mass.      Tenderness: There is no abdominal tenderness. There is no guarding or  rebound.   Genitourinary:     General: Normal vulva.      Exam position: Lithotomy position.      Pubic Area: No rash.       Vagina: Bleeding (Scant blood in vaginal cavity) present. No vaginal discharge.      Cervix: Normal and dilated. No cervical bleeding.     Musculoskeletal:         General: No swelling.      Cervical back: Neck supple.     Skin:     General: Skin is warm and dry.      Capillary Refill: Capillary refill takes less than 2 seconds.     Neurological:      General: No focal deficit present.      Mental Status: She is alert and oriented to person, place, and time.     Psychiatric:         Mood and Affect: Mood normal.         Results Reviewed       Procedure Component Value Units Date/Time    HS Troponin 0hr (reflex protocol) [[id number]]  (Normal) Collected: 06/29/25 1845    Lab Status: Final result Specimen: Blood from Arm, Left Updated: 06/29/25 1911     hs TnI 0hr <2 ng/L     Comprehensive metabolic panel [[id number]] Collected: 06/29/25 1845    Lab Status: Final result Specimen: Blood from Arm, Left Updated: 06/29/25 1905     Sodium 137 mmol/L      Potassium 3.7 mmol/L      Chloride 106 mmol/L      CO2 27 mmol/L      ANION GAP 4 mmol/L      BUN 11 mg/dL      Creatinine 0.65 mg/dL      Glucose 121 mg/dL      Calcium 8.9 mg/dL      AST 25 U/L      ALT 44 U/L      Alkaline Phosphatase 85 U/L      Total Protein 7.4 g/dL      Albumin 4.0 g/dL      Total Bilirubin 0.49 mg/dL      eGFR 117 ml/min/1.73sq m     Narrative:      National Kid[employer] guidelines for Chronic Kidney Disease (CKD):     Stage 1 with normal or high GFR (GFR > 90 mL/min/1.73 square meters)    Stage 2 Mild CKD (GFR = 60-89 mL/min/1.73 square meters)    Stage 3A Moderate CKD (GFR = 45-59 mL/min/1.73 square meters)    Stage 3B Moderate CKD (GFR = 30-44 mL/min/1.73 square meters)    Stage 4 Severe CKD (GFR = 15-29 mL/min/1.73 square meters)    Stage 5 End Stage CKD (GFR <15 mL/min/1.73 square meters)  Note: GFR  calculation is accurate only with a steady state creatinine    Urine Microscopic [[id number]]  (Abnormal) Collected: 06/29/25 1831    Lab Status: Final result Specimen: Urine, Clean Catch Updated: 06/29/25 1857     RBC, UA Innumerable /hpf      WBC, UA 2-4 /hpf      Epithelial Cells Occasional /hpf      Bacteria, UA Occasional /hpf      MUCUS THREADS Innumerable    CBC and differential [[id number]] Collected: 06/29/25 1845    Lab Status: Final result Specimen: Blood from Arm, Left Updated: 06/29/25 1850     WBC 9.64 Thousand/uL      RBC 4.38 Million/uL      Hemoglobin 12.3 g/dL      Hematocrit 36.0 %      MCV 82 fL      MCH 28.1 pg      MCHC 34.2 g/dL      RDW 13.1 %      MPV 10.2 fL      Platelets 281 Thousands/uL      nRBC 0 /100 WBCs      Segmented % 60 %      Immature Grans % 1 %      Lymphocytes % 30 %      Monocytes % 6 %      Eosinophils Relative 2 %      Basophils Relative 1 %      Absolute Neutrophils 5.91 Thousands/µL      Absolute Immature Grans 0.06 Thousand/uL      Absolute Lymphocytes 2.87 Thousands/µL      Absolute Monocytes 0.58 Thousand/µL      Eosinophils Absolute 0.16 Thousand/µL      Basophils Absolute 0.06 Thousands/µL     UA (URINE) with reflex to Scope [[id number]]  (Abnormal) Collected: 06/29/25 1831    Lab Status: Final result Specimen: Urine, Clean Catch Updated: 06/29/25 1847     Color, UA Yellow     Clarity, UA Clear     Specific Gravity, UA >=1.030     pH, UA 5.0     Leukocytes, UA Negative     Nitrite, UA Negative     Protein, UA Trace mg/dl      Glucose, UA Negative mg/dl      Ketones, UA Trace mg/dl      Urobilinogen, UA 2.0 mg/dl      Bilirubin, UA Negative     Occult Blood, UA Large    POCT pregnancy, urine [[id number]]  (Normal) Collected: 06/29/25 1836    Lab Status: Final result Updated: 06/29/25 1836     EXT Preg Test, Ur Negative     Control Valid            No orders to display       Procedures    ED Medication and Procedure Management   Prior to Admission Medications    Prescriptions Last Dose Informant Patient Reported? Taking?   albuterol (Proventil HFA) 90 mcg/act inhaler   No No   Sig: Inhale 2 puffs every 6 (six) hours as needed for wheezing   butalbital-acetaminophen-caffeine (FIORICET,ESGIC) -40 mg per tablet   No No   Sig: Take 1 tablet by mouth every 4 (four) hours as needed for headaches or migraine   dicyclomine (BENTYL) 10 mg capsule   No No   Sig: Take 1 capsule (10 mg total) by mouth 4 (four) times a day as needed (Abdominal pain)   ergocalciferol (VITAMIN D2) 50,000 units   No No   Sig: Take 1 capsule (50,000 Units total) by mouth once a week for 12 doses   escitalopram (LEXAPRO) 10 mg tablet   No No   Sig: Take 1 tablet (10 mg total) by mouth daily   ferrous sulfate 324 (65 Fe) mg   No No   Sig: Take 1 tablet (324 mg total) by mouth daily before breakfast   hydrOXYzine HCL (ATARAX) 25 mg tablet   No No   Sig: Take 1 tablet (25 mg total) by mouth every 6 (six) hours as needed for anxiety   levothyroxine 25 mcg tablet   No No   Sig: Take 1 tablet (25 mcg total) by mouth daily in the early morning   methocarbamol (ROBAXIN) 500 mg tablet   No No   Sig: Take 1 tablet (500 mg total) by mouth 3 (three) times a day as needed for muscle spasms   ondansetron (ZOFRAN-ODT) 4 mg disintegrating tablet   No No   Sig: Take 1 tablet (4 mg total) by mouth every 6 (six) hours as needed for nausea or vomiting   topiramate (TOPAMAX) 50 MG tablet   No No   Sig: Take 1 tablet (50 mg total) by mouth 2 (two) times a day   triamcinolone (KENALOG) 0.1 % cream   No No   Sig: Apply topically 2 (two) times a day      Facility-Administered Medications: None     Discharge Medication List as of 6/29/2025  7:19 PM        START taking these medications    Details   medroxyPROGESTERone (PROVERA) 5 mg tablet Take 1 tablet (5 mg total) by mouth daily for 5 days, Starting Sun 6/29/2025, Until Fri 7/4/2025, Normal           CONTINUE these medications which have NOT CHANGED    Details   albuterol  (Proventil HFA) 90 mcg/act inhaler Inhale 2 puffs every 6 (six) hours as needed for wheezing, Starting Tue 12/31/2024, Normal      butalbital-acetaminophen-caffeine (FIORICET,ESGIC) -40 mg per tablet Take 1 tablet by mouth every 4 (four) hours as needed for headaches or migraine, Starting Tue 5/6/2025, Normal      dicyclomine (BENTYL) 10 mg capsule Take 1 capsule (10 mg total) by mouth 4 (four) times a day as needed (Abdominal pain), Starting Tue 5/6/2025, Normal      ergocalciferol (VITAMIN D2) 50,000 units Take 1 capsule (50,000 Units total) by mouth once a week for 12 doses, Starting Tue 5/6/2025, Until Wed 7/23/2025, Normal      escitalopram (LEXAPRO) 10 mg tablet Take 1 tablet (10 mg total) by mouth daily, Starting Wed 6/11/2025, Normal      ferrous sulfate 324 (65 Fe) mg Take 1 tablet (324 mg total) by mouth daily before breakfast, Starting Tue 5/6/2025, Normal      hydrOXYzine HCL (ATARAX) 25 mg tablet Take 1 tablet (25 mg total) by mouth every 6 (six) hours as needed for anxiety, Starting Wed 6/11/2025, Normal      levothyroxine 25 mcg tablet Take 1 tablet (25 mcg total) by mouth daily in the early morning, Starting Tue 5/6/2025, Normal      methocarbamol (ROBAXIN) 500 mg tablet Take 1 tablet (500 mg total) by mouth 3 (three) times a day as needed for muscle spasms, Starting Fri 5/9/2025, Normal      ondansetron (ZOFRAN-ODT) 4 mg disintegrating tablet Take 1 tablet (4 mg total) by mouth every 6 (six) hours as needed for nausea or vomiting, Starting Fri 2/7/2025, Normal      topiramate (TOPAMAX) 50 MG tablet Take 1 tablet (50 mg total) by mouth 2 (two) times a day, Starting Tue 5/6/2025, Normal      triamcinolone (KENALOG) 0.1 % cream Apply topically 2 (two) times a day, Starting Wed 2/12/2025, Normal           No discharge procedures on file.  ED SEPSIS DOCUMENTATION   Time reflects when diagnosis was documented in both MDM as applicable and the Disposition within this note       Time User Action Codes  Description Comment    2025  7:17 PM Galindo oSrenson [N92.1] Metrorrhagia                      [1]   Past Medical History:  Diagnosis Date    Abnormal Pap smear of cervix 2015 pap negative;    Anxiety     Asthma     Bronchitis 2024    Chlamydia 2014    Costochondritis 2023    Depression     Diabetes mellitus (HCC)     Gestational Diabetes    Fatty liver     GERD (gastroesophageal reflux disease)     Gestational diabetes     Headache(784.0)     Leukocytosis 2024    Migraine     will be seeing an neurologist    Otitis media     Pre-diabetes     Sunburn 2024    Viral gastroenteritis 2025    Visual impairment     Yeast infection 2023   [2]   Past Surgical History:  Procedure Laterality Date     SECTION      induced due to GDM, ? ruptured placenta w/ emergency C/S    LYMPHADENECTOMY     [3]   Family History  Problem Relation Name Age of Onset    Cancer Mother Theodora J Colon 54        Multiple Myeloma    Diabetes Maternal Grandmother An Dalton     Breast cancer Other materal great aunt     Colon cancer Neg Hx      Ovarian cancer Neg Hx      Uterine cancer Neg Hx     [4]   Social History  Tobacco Use    Smoking status: Never    Smokeless tobacco: Never   Vaping Use    Vaping status: Never Used   Substance Use Topics    Alcohol use: Not Currently    Drug use: Never        Galindo Sorenson DO  23     Gabapentin Counseling: I discussed with the patient the risks of gabapentin including but not limited to dizziness, somnolence, fatigue and ataxia.

## 2025-06-29 NOTE — DISCHARGE INSTRUCTIONS
You are seen in the emergency department for irregular vaginal bleeding.  We performed lab work and a physical exam and feel that you are safe for discharge at this time.  Please keep your follow-up with OB/GYN later this month.  Please return emerged department should you experience any severe worsening of your symptoms, heavy vaginal bleeding that you cannot control, shortness of breath, syncope, or any other concerning symptoms you like evaluated.  Otherwise please follow-up with OB.  We are also sending you with a prescription for progesterone to be taken for the next 5 days.  You may also start taking ibuprofen for 100 mg every 6 hours to help with any cramping as well as decreased vaginal bleeding.

## 2025-06-30 ENCOUNTER — VBI (OUTPATIENT)
Dept: FAMILY MEDICINE CLINIC | Facility: CLINIC | Age: 30
End: 2025-06-30

## 2025-06-30 NOTE — LETTER
[facility]  [address]  [address]  [place] [address]  [phone]    Date: 06/30/25    María Gusman  [address]  [address]  [place] [address]    Dear María:                                                                                                                                Thank you for choosing [facility] emergency department for care.  Your primary care provider wants to make sure that your ongoing medical care is being addressed. If you require follow up care as a result of your emergency department visit, there are a few things the practice would like you to know.                As part of the network's continuing commitment to caring for our patients, we have added more same day appointments and have extended office hours to meet your medical needs. After hours, on-call physicians are available via your primary care provider's main office line.               We encourage you to contact our office prior to seeking treatment to discuss your symptoms with the medical staff.  Together, we can determine the correct course of action.  A majority of non-emergent conditions such as: common cold, flu-like symptoms, fevers, strains/sprains, dislocations, minor burns, cuts and animal bites can be treated at [facility] facilities. Diagnostic testing is available at some sites.               Of course, if you are experiencing a life threatening medical emergency call 911 or proceed directly to the nearest emergency room.    Your nearest [facility] facility is conveniently located at:    [facility]  [address] [place] [address] [address]  [place] [address]  [phone]  SKIP THE WAIT  Conveniently offered at most [facility] locations  Linn your spot online at [email or web address] or on the [facility]    Sincerely,    [facility]  Dept: [phone]

## 2025-06-30 NOTE — TELEPHONE ENCOUNTER
06/30/25 11:08 AM    Patient contacted post ED visit, [facility] department spoke with patient/caregiver and outreach questions were declined.    Thank you.  Demetrice Jha MA  [facility]

## 2025-07-03 DIAGNOSIS — F41.9 ANXIETY: ICD-10-CM

## 2025-07-04 RX ORDER — ESCITALOPRAM OXALATE 10 MG/1
10 TABLET ORAL DAILY
Qty: 90 TABLET | Refills: 1 | Status: SHIPPED | OUTPATIENT
Start: 2025-07-04

## 2025-07-09 ENCOUNTER — OFFICE VISIT (OUTPATIENT)
Age: 30
End: 2025-07-09
Payer: COMMERCIAL

## 2025-07-09 ENCOUNTER — NURSE TRIAGE (OUTPATIENT)
Age: 30
End: 2025-07-09

## 2025-07-09 VITALS
BODY MASS INDEX: 33.43 KG/M2 | DIASTOLIC BLOOD PRESSURE: 90 MMHG | WEIGHT: 165.8 LBS | SYSTOLIC BLOOD PRESSURE: 128 MMHG | HEIGHT: 59 IN

## 2025-07-09 DIAGNOSIS — N93.9 ABNORMAL UTERINE BLEEDING (AUB): Primary | ICD-10-CM

## 2025-07-09 DIAGNOSIS — E03.8 SUBCLINICAL HYPOTHYROIDISM: ICD-10-CM

## 2025-07-09 PROBLEM — L81.9 DISORDER OF PIGMENTATION: Status: ACTIVE | Noted: 2025-06-25

## 2025-07-09 PROCEDURE — 99459 PELVIC EXAMINATION: CPT | Performed by: OBSTETRICS & GYNECOLOGY

## 2025-07-09 PROCEDURE — 99214 OFFICE O/P EST MOD 30 MIN: CPT | Performed by: OBSTETRICS & GYNECOLOGY

## 2025-07-09 RX ORDER — TRANEXAMIC ACID 650 MG/1
1300 TABLET ORAL 3 TIMES DAILY
Qty: 30 TABLET | Refills: 0 | Status: SHIPPED | OUTPATIENT
Start: 2025-07-09 | End: 2025-07-14

## 2025-07-09 NOTE — TELEPHONE ENCOUNTER
"REASON FOR CONVERSATION: Menometrorrhagia    SYMPTOMS: has been bleeding since May. Went to ED 6/29, took Provera x 5 days, bleeding better after this. Today bleeding returned heavy, soaked through 3 regular size pads this AM ( not maxi size) currently not soaking pads, denies feeling dizzy or lightheaded. Passed 2 clots ( not large per patient) denies chest pain, no sob. Before this periods were monthly. Has HA but patient states this is her usual due to stress of working. Minimal pelvic cramps comes and go, not painful. Patient took HA meds this AM and she states pain is improving.     OTHER HEALTH INFORMATION: ED 6/29    PROTOCOL DISPOSITION: See Today in Office    CARE ADVICE PROVIDED: take Ibuprofen. Monitor bleeding, bleeding precautions, when to seek UC/ED care reviewed and patient verbalzied understanding.      PRACTICE FOLLOW-UP: HP to  and office clerical with add on appointment today.       Reason for Disposition   Patient wants to be seen    Answer Assessment - Initial Assessment Questions  1. BLEEDING SEVERITY: \"Describe the bleeding that you are having.\" \"How much bleeding is there?\"       Bleeding since May,went to ED prescribed pills to stop bleeding, took x 5 days. This AM  soaked through her regular size pads x 3 hours, several gushes and passed 2 clots. Last pad check is not full, no clots. Has HA but this is her normal from working 12 hours. Not dizzy, not lightheaded, no sob, no chest pain.    2. ONSET: \"When did the bleeding begin?\" \"Is it continuing now?\"      Ongoing bleeding since May   3. MENSTRUAL PERIOD: \"When was the last normal menstrual period?\" \"How is this different than your period?\"      Before May periods were coming monthly    4. REGULARITY: \"How regular are your periods?\"      Monthly   5. ABDOMEN PAIN: \"Do you have any pain?\" \"How bad is the pain?\"  (e.g., Scale 0-10; none, mild, moderate, or severe)      Light pelvic cramping   6. PREGNANCY: \"Is there any chance you are " "pregnant?\" \"When was your last menstrual period?\"      Denies, ED test negative   7. BREASTFEEDING: \"Are you breastfeeding?\"      no  8. HORMONE MEDICINES: \"Are you taking any hormone medicines, prescription or over-the-counter?\" (e.g., birth control pills, estrogen)      Took Progesterone daily   x 5 days   9. BLOOD THINNER MEDICINES: \"Do you take any blood thinners?\" (e.g., Coumadin / warfarin, Pradaxa / dabigatran, aspirin)      no  10. CAUSE: \"What do you think is causing the bleeding?\" (e.g., recent gyn surgery, recent gyn procedure; known bleeding disorder, cervical cancer, polycystic ovarian disease, fibroids)          She had this same issue few years ago   11. HEMODYNAMIC STATUS: \"Are you weak or feeling lightheaded?\" If Yes, ask: \"Can you stand and walk normally?\"         Denies   12. OTHER SYMPTOMS: \"What other symptoms are you having with the bleeding?\" (e.g., passed tissue, vaginal discharge, fever, menstrual-type cramps)       Normal HA pain, has hx of freq migraines, takes Fioricet and Topamax    Protocols used: Vaginal Bleeding - Abnormal-Adult-OH    "

## 2025-07-09 NOTE — PROGRESS NOTES
Patient is a 30 y.o.  with Patient's last menstrual period was 2025 (approximate). who presents requesting evaluation of AUB.    She reports she got her period when she was supposed to on 2025. She had spotting for 3 days and then a normal period for 5 days. Her bleeding stopped for a week. Then she started spotting for about 4 days and then she developed menstrual bleeding. She reports her bleeding has waxed ad waned since then. She reports sometimes she has spotting and other days it is like menstrual bleeding. She went to the ED on 2025 and was prescribed Provera for 5 days. She reports he bleeding decreased to spotting and then this morning she woke up with menstrual like bleeding today.    She reports no ultrasound was performed in the ED.  Review of Ed records show CBC and CMP which were normal. Negative UPT as well.     She reports her symptoms started after starting a new thyroid medication. She was diagnosed with subclinical hypothyroidism by her PCP on 2025 by review or records. She did not start her Synthroid until 2025 per pt report.    Reviewed with patient that I will check her TSH and FT4 as abnormal thyroid function can contribute to abnormal bleeding. Recommend pelvic ultrasound and will also check PRL. Pt is agreeable    She would like medication to stop her bleeding. TXA given.     Past Medical History[1]    Past Surgical History[2]    OB History    Para Term  AB Living   1 1 1   1   SAB IAB Ectopic Multiple Live Births      1 1      # Outcome Date GA Lbr Rocael/2nd Weight Sex Type Anes PTL Lv   1A             1B Term 04/20/15 40w0d  3440 g (7 lb 9.3 oz) M CS-Unspec Gen  RAY      Birth Comments: Mom had gestational diabetes      Obstetric Comments   Menarche 7   Cycles irregular,  Q26-54 days, x 6-7d   Had a very heavy period in 2024 w/ large clots.   1st pregnancy with different partner.         Current Medications[3]    Allergies[4]    Social  "History[5]    Family History[6]    Review of Systems   Constitutional:  Negative for chills, fatigue, fever and unexpected weight change.   HENT:  Negative for congestion, mouth sores and sore throat.    Respiratory:  Negative for cough, chest tightness, shortness of breath and wheezing.    Cardiovascular:  Negative for chest pain and palpitations.   Gastrointestinal:  Negative for abdominal distention, abdominal pain, constipation, diarrhea, nausea and vomiting.   Endocrine: Negative for cold intolerance and heat intolerance.   Genitourinary:  Positive for menstrual problem. Negative for dyspareunia (not active since AUB started), dysuria, genital sores, pelvic pain, vaginal bleeding, vaginal discharge and vaginal pain.   Musculoskeletal:  Negative for arthralgias.   Skin:  Negative for color change and rash.   Neurological:  Negative for dizziness, light-headedness and headaches.   Hematological:  Negative for adenopathy.       Blood pressure 128/90, height 4' 11\" (1.499 m), weight 75.2 kg (165 lb 12.8 oz), last menstrual period 05/05/2025. and Body mass index is 33.49 kg/m².    Physical Exam  Constitutional:       General: She is not in acute distress.     Appearance: Normal appearance. She is obese. She is not ill-appearing.   HENT:      Head: Normocephalic and atraumatic.     Eyes:      Extraocular Movements: Extraocular movements intact.      Conjunctiva/sclera: Conjunctivae normal.     Pulmonary:      Effort: Pulmonary effort is normal.   Abdominal:      General: There is no distension.      Palpations: Abdomen is soft. There is no mass.      Tenderness: There is no abdominal tenderness. There is no guarding or rebound.      Hernia: No hernia is present.     Musculoskeletal:         General: Normal range of motion.      Cervical back: Normal range of motion.      Right lower leg: No edema.      Left lower leg: No edema.     Skin:     General: Skin is warm.      Findings: No erythema, lesion or rash. "     Neurological:      Mental Status: She is alert and oriented to person, place, and time.     Psychiatric:         Mood and Affect: Mood normal.         Behavior: Behavior normal.         Thought Content: Thought content normal.         Judgment: Judgment normal.          vulva: normal external genitalia for age and no lesions, masses, epithelial changes, or exudate  vagina: color pink, rugae  well formed rugae, and bleeding  with bleeding which is scant  cervix: no lesions   uterus: NSSC, AF, NT, mobile  adnexa: no masses or tenderness      A/P:  Pt is a 30 y.o.  with      María was seen today for multiple concerns.    Diagnoses and all orders for this visit:    Abnormal uterine bleeding (AUB)  -     US pelvis complete w transvaginal; Future  -     Prolactin; Future  -     TSH, 3rd generation; Future  -     T4, free; Future  -     Tranexamic Acid 650 MG TABS; Take 2 tablets (1,300 mg total) by mouth 3 (three) times a day for 5 days    Subclinical hypothyroidism  -     TSH, 3rd generation; Future  -     T4, free; Future                                  [1]   Past Medical History:  Diagnosis Date    Abnormal Pap smear of cervix 2015 pap negative;    Anxiety     Asthma     Bronchitis 2024    Chlamydia 2014    Costochondritis 2023    Depression     Diabetes mellitus (HCC)     Gestational Diabetes    Fatty liver     GERD (gastroesophageal reflux disease)     Gestational diabetes     Headache(784.0)     Leukocytosis 2024    Migraine     will be seeing an neurologist    Otitis media     Pre-diabetes     Sunburn 2024    Viral gastroenteritis 2025    Visual impairment     Yeast infection 2023   [2]   Past Surgical History:  Procedure Laterality Date     SECTION      induced due to GDM, ? ruptured placenta w/ emergency C/S    LYMPHADENECTOMY     [3]   Current Outpatient Medications:     albuterol (Proventil HFA) 90 mcg/act inhaler, Inhale 2 puffs every 6 (six)  hours as needed for wheezing, Disp: 18 g, Rfl: 5    butalbital-acetaminophen-caffeine (FIORICET,ESGIC) -40 mg per tablet, Take 1 tablet by mouth every 4 (four) hours as needed for headaches or migraine, Disp: 30 tablet, Rfl: 0    dicyclomine (BENTYL) 10 mg capsule, Take 1 capsule (10 mg total) by mouth 4 (four) times a day as needed (Abdominal pain), Disp: 30 capsule, Rfl: 1    ergocalciferol (VITAMIN D2) 50,000 units, Take 1 capsule (50,000 Units total) by mouth once a week for 12 doses, Disp: 12 capsule, Rfl: 0    escitalopram (LEXAPRO) 10 mg tablet, TAKE 1 TABLET BY MOUTH EVERY DAY, Disp: 90 tablet, Rfl: 1    ferrous sulfate 324 (65 Fe) mg, Take 1 tablet (324 mg total) by mouth daily before breakfast, Disp: 90 tablet, Rfl: 1    hydrOXYzine HCL (ATARAX) 25 mg tablet, Take 1 tablet (25 mg total) by mouth every 6 (six) hours as needed for anxiety, Disp: 30 tablet, Rfl: 0    levothyroxine 25 mcg tablet, Take 1 tablet (25 mcg total) by mouth daily in the early morning, Disp: 90 tablet, Rfl: 1    methocarbamol (ROBAXIN) 500 mg tablet, Take 1 tablet (500 mg total) by mouth 3 (three) times a day as needed for muscle spasms, Disp: 30 tablet, Rfl: 0    ondansetron (ZOFRAN-ODT) 4 mg disintegrating tablet, Take 1 tablet (4 mg total) by mouth every 6 (six) hours as needed for nausea or vomiting, Disp: 30 tablet, Rfl: 0    topiramate (TOPAMAX) 50 MG tablet, Take 1 tablet (50 mg total) by mouth 2 (two) times a day, Disp: 180 tablet, Rfl: 1    Tranexamic Acid 650 MG TABS, Take 2 tablets (1,300 mg total) by mouth 3 (three) times a day for 5 days, Disp: 30 tablet, Rfl: 0    triamcinolone (KENALOG) 0.1 % cream, Apply topically 2 (two) times a day, Disp: 45 g, Rfl: 1  [4] No Known Allergies  [5]   Social History  Socioeconomic History    Marital status: Single   Tobacco Use    Smoking status: Never    Smokeless tobacco: Never   Vaping Use    Vaping status: Never Used   Substance and Sexual Activity    Alcohol use: Not  Currently    Drug use: Never    Sexual activity: Yes     Partners: Male     Birth control/protection: None     Comment: current partner 3.5 yrs     Social Drivers of Health     Financial Resource Strain: Low Risk  (5/7/2021)    Received from [facility]    Overall Financial Resource Strain (CARDIA)     Difficulty of Paying Living Expenses: Not hard at all   Food Insecurity: No Food Insecurity (5/7/2021)    Received from [facility]    Hunger Vital Sign     Within the past 12 months, you worried that your food would run out before you got the money to buy more.: Never true     Within the past 12 months, the food you bought just didn't last and you didn't have money to get more.: Never true   Transportation Needs: No Transportation Needs (5/7/2021)    Received from [facility]    PRAPARE - Transportation     Lack of Transportation (Medical): No     Lack of Transportation (Non-Medical): No   Physical Activity: Insufficiently Active (5/7/2021)    Received from [facility]    Exercise Vital Sign     On average, how many days per week do you engage in moderate to strenuous exercise (like a brisk walk)?: 3 days     On average, how many minutes do you engage in exercise at this level?: 10 min   Stress: Stress Concern Present (5/7/2021)    Received from [facility]    [employer] - Occupational Stress Questionnaire     Feeling of Stress : To some extent   Social Connections: Socially Isolated (5/7/2021)    Received from [facility]    Social Connection and Isolation Panel     In a typical week, how many times do you talk on the phone with family, friends, or neighbors?: More than three times a week     How often do you get together with friends or relatives?: Twice a week     How often do you attend Voodoo or [employer] services?: Never     Do you belong to any clubs or organizations such as Voodoo groups, unions,  fraternal or athletic groups, or school groups?: No     How often do you attend meetings of the clubs or organizations you belong to?: Never     Are you , , , , never , or living with a partner?: Never     Received from [facility]    Intimate Partner Violence   Housing Stability: Low Risk  (5/7/2021)    Received from [facility]    Housing Stability Vital Sign     Unable to Pay for Housing in the Last Year: No     Number of Places Lived in the Last Year: 1     Unstable Housing in the Last Year: No   [6]   Family History  Problem Relation Name Age of Onset    Cancer Mother Theodora J Colon 54        Multiple Myeloma    Diabetes Maternal Grandmother An Juan Antonio     Breast cancer Other materal great aunt     Colon cancer Neg Hx      Ovarian cancer Neg Hx      Uterine cancer Neg Hx

## 2025-07-09 NOTE — TELEPHONE ENCOUNTER
Regarding: heavy, prolonged bleeding  ----- Message from Melony MARIE sent at 7/9/2025  8:48 AM EDT -----  Patient called reporting bleeding for 1+ month. Pt states they needed to change their pad 2 xs within 1 hour this morning & has a headache. Pt was seen at ED 6/29 & given Provera which has not improved sxs.   While attempting to warm transfer, pt disconnected. Called back, left non-detailed vm

## 2025-07-09 NOTE — TELEPHONE ENCOUNTER
Left VMM to call office.   (Patient was also triaged 6/20 for vaginal bleeding- was offered 2 same day appts and patient scheduled office appointment 7/17)

## 2025-07-14 ENCOUNTER — APPOINTMENT (EMERGENCY)
Dept: ULTRASOUND IMAGING | Facility: HOSPITAL | Age: 30
End: 2025-07-14
Payer: COMMERCIAL

## 2025-07-14 ENCOUNTER — HOSPITAL ENCOUNTER (EMERGENCY)
Facility: HOSPITAL | Age: 30
Discharge: HOME | End: 2025-07-15
Attending: EMERGENCY MEDICINE | Admitting: EMERGENCY MEDICINE
Payer: COMMERCIAL

## 2025-07-14 VITALS
HEART RATE: 103 BPM | WEIGHT: 165.34 LBS | TEMPERATURE: 98.7 F | DIASTOLIC BLOOD PRESSURE: 73 MMHG | RESPIRATION RATE: 17 BRPM | SYSTOLIC BLOOD PRESSURE: 121 MMHG | OXYGEN SATURATION: 96 % | BODY MASS INDEX: 33.4 KG/M2

## 2025-07-14 DIAGNOSIS — N93.8 DYSFUNCTIONAL UTERINE BLEEDING: Primary | ICD-10-CM

## 2025-07-14 DIAGNOSIS — D25.9 UTERINE FIBROID: ICD-10-CM

## 2025-07-14 LAB
ABO GROUP BLD: NORMAL
ALBUMIN SERPL BCG-MCNC: 4 G/DL (ref 3.5–5)
ALP SERPL-CCNC: 77 U/L (ref 34–104)
ALT SERPL W P-5'-P-CCNC: 35 U/L (ref 7–52)
ANION GAP SERPL CALCULATED.3IONS-SCNC: 7 MMOL/L (ref 4–13)
APTT PPP: 35 SECONDS (ref 23–34)
AST SERPL W P-5'-P-CCNC: 22 U/L (ref 13–39)
BASOPHILS # BLD AUTO: 0.05 THOUSANDS/ÂΜL (ref 0–0.1)
BASOPHILS NFR BLD AUTO: 1 % (ref 0–1)
BILIRUB SERPL-MCNC: 0.36 MG/DL (ref 0.2–1)
BLD GP AB SCN SERPL QL: NEGATIVE
BUN SERPL-MCNC: 12 MG/DL (ref 5–25)
CALCIUM SERPL-MCNC: 8.6 MG/DL (ref 8.4–10.2)
CHLORIDE SERPL-SCNC: 107 MMOL/L (ref 96–108)
CO2 SERPL-SCNC: 24 MMOL/L (ref 21–32)
CREAT SERPL-MCNC: 0.69 MG/DL (ref 0.6–1.3)
EOSINOPHIL # BLD AUTO: 0.23 THOUSAND/ÂΜL (ref 0–0.61)
EOSINOPHIL NFR BLD AUTO: 2 % (ref 0–6)
ERYTHROCYTE [DISTWIDTH] IN BLOOD BY AUTOMATED COUNT: 13.1 % (ref 11.6–15.1)
GFR SERPL CREATININE-BSD FRML MDRD: 115 ML/MIN/1.73SQ M
GLUCOSE SERPL-MCNC: 166 MG/DL (ref 65–140)
HCG SERPL QL: NEGATIVE
HCT VFR BLD AUTO: 32.9 % (ref 34.8–46.1)
HGB BLD-MCNC: 11.3 G/DL (ref 11.5–15.4)
IMM GRANULOCYTES # BLD AUTO: 0.05 THOUSAND/UL (ref 0–0.2)
IMM GRANULOCYTES NFR BLD AUTO: 1 % (ref 0–2)
INR PPP: 1.05 (ref 0.85–1.19)
LYMPHOCYTES # BLD AUTO: 3.47 THOUSANDS/ÂΜL (ref 0.6–4.47)
LYMPHOCYTES NFR BLD AUTO: 34 % (ref 14–44)
MCH RBC QN AUTO: 28.5 PG (ref 26.8–34.3)
MCHC RBC AUTO-ENTMCNC: 34.3 G/DL (ref 31.4–37.4)
MCV RBC AUTO: 83 FL (ref 82–98)
MONOCYTES # BLD AUTO: 0.59 THOUSAND/ÂΜL (ref 0.17–1.22)
MONOCYTES NFR BLD AUTO: 6 % (ref 4–12)
NEUTROPHILS # BLD AUTO: 5.69 THOUSANDS/ÂΜL (ref 1.85–7.62)
NEUTS SEG NFR BLD AUTO: 56 % (ref 43–75)
NRBC BLD AUTO-RTO: 0 /100 WBCS
PLATELET # BLD AUTO: 305 THOUSANDS/UL (ref 149–390)
PMV BLD AUTO: 10.3 FL (ref 8.9–12.7)
POTASSIUM SERPL-SCNC: 3.2 MMOL/L (ref 3.5–5.3)
PROT SERPL-MCNC: 7.3 G/DL (ref 6.4–8.4)
PROTHROMBIN TIME: 13.9 SECONDS (ref 12.3–15)
RBC # BLD AUTO: 3.96 MILLION/UL (ref 3.81–5.12)
RH BLD: POSITIVE
SODIUM SERPL-SCNC: 138 MMOL/L (ref 135–147)
SPECIMEN EXPIRATION DATE: NORMAL
TSH SERPL DL<=0.05 MIU/L-ACNC: 3.79 UIU/ML (ref 0.45–4.5)
WBC # BLD AUTO: 10.08 THOUSAND/UL (ref 4.31–10.16)

## 2025-07-14 PROCEDURE — 99284 EMERGENCY DEPT VISIT MOD MDM: CPT

## 2025-07-14 PROCEDURE — 85025 COMPLETE CBC W/AUTO DIFF WBC: CPT

## 2025-07-14 PROCEDURE — 85610 PROTHROMBIN TIME: CPT

## 2025-07-14 PROCEDURE — 36415 COLL VENOUS BLD VENIPUNCTURE: CPT

## 2025-07-14 PROCEDURE — 85730 THROMBOPLASTIN TIME PARTIAL: CPT

## 2025-07-14 PROCEDURE — 96361 HYDRATE IV INFUSION ADD-ON: CPT

## 2025-07-14 PROCEDURE — 76830 TRANSVAGINAL US NON-OB: CPT

## 2025-07-14 PROCEDURE — 99285 EMERGENCY DEPT VISIT HI MDM: CPT

## 2025-07-14 PROCEDURE — 86850 RBC ANTIBODY SCREEN: CPT

## 2025-07-14 PROCEDURE — 76856 US EXAM PELVIC COMPLETE: CPT

## 2025-07-14 PROCEDURE — 86900 BLOOD TYPING SEROLOGIC ABO: CPT

## 2025-07-14 PROCEDURE — 96375 TX/PRO/DX INJ NEW DRUG ADDON: CPT

## 2025-07-14 PROCEDURE — 80053 COMPREHEN METABOLIC PANEL: CPT

## 2025-07-14 PROCEDURE — 84703 CHORIONIC GONADOTROPIN ASSAY: CPT

## 2025-07-14 PROCEDURE — 86901 BLOOD TYPING SEROLOGIC RH(D): CPT

## 2025-07-14 PROCEDURE — 84443 ASSAY THYROID STIM HORMONE: CPT

## 2025-07-14 PROCEDURE — 84146 ASSAY OF PROLACTIN: CPT

## 2025-07-14 RX ORDER — KETOROLAC TROMETHAMINE 30 MG/ML
15 INJECTION, SOLUTION INTRAMUSCULAR; INTRAVENOUS ONCE
Status: COMPLETED | OUTPATIENT
Start: 2025-07-14 | End: 2025-07-14

## 2025-07-14 RX ADMIN — SODIUM CHLORIDE 500 ML: 0.9 INJECTION, SOLUTION INTRAVENOUS at 21:59

## 2025-07-14 RX ADMIN — KETOROLAC TROMETHAMINE 15 MG: 30 INJECTION, SOLUTION INTRAMUSCULAR; INTRAVENOUS at 21:15

## 2025-07-15 LAB — PROLACTIN SERPL-MCNC: 28.9 NG/ML (ref 3.34–26.72)

## 2025-07-15 PROCEDURE — 96365 THER/PROPH/DIAG IV INF INIT: CPT

## 2025-07-15 RX ORDER — TRANEXAMIC ACID 10 MG/ML
1000 INJECTION, SOLUTION INTRAVENOUS ONCE
Status: COMPLETED | OUTPATIENT
Start: 2025-07-15 | End: 2025-07-15

## 2025-07-15 RX ADMIN — TRANEXAMIC ACID 1000 MG: 10 INJECTION, SOLUTION INTRAVENOUS at 00:36

## 2025-07-15 NOTE — ED PROVIDER NOTES
"Time reflects when diagnosis was documented in both MDM as applicable and the Disposition within this note       Time User Action Codes Description Comment    7/15/2025 12:12 AM Melody Solares [N93.8] Dysfunctional uterine bleeding     7/15/2025 12:12 AM Melody Solares [D25.9] Uterine fibroid           ED Disposition       ED Disposition   Discharge    Condition   Stable    Date/Time   Tue Jul 15, 2025 12:12 AM    Comment   María Gusman discharge to home/self care.                   Assessment & Plan       Medical Decision Making  DDx including but not limited to: ectopic pregnancy, threatened , missed , incomplete , anemia, coagulopathy, DUB, tumor, retained products of conception, PCOS; doubt ovarian torsion or ruptured ovarian cyst.      Will obtain CBC to evaluate for leukocytosis, anemia.  Will obtain CMP to evaluate kidney function, for electrolyte disturbance.  Will obtain coags.  Will obtain qualitative hcg, TSH/PRL (ordered by OB/GYN outpt), TVUS.     Hgb 11.3, was 12.3 two weeks ago. Labs otherwise unremarkable. K mildly low. US findings as below; fibroid uterus. Discussed presentation with OB/GYN resident, Dr. Dileep Levi, who recommends TXA 1g in ED and for pt to take TXA prescribed by Dr. Early, close OB/GYN follow up.    At the time of discharge, the patient is in no acute distress. She does report decrease in amount of bleeding while in ED. Pain is also improved. I discussed with the patient the diagnosis, treatment plan, follow-up, return precautions, and discharge instructions; they were given the opportunity to ask questions and verbalized understanding.    Disclosure: Voice to text software was used in the preparation of this document and could have resulted in translational errors.    Occasional wrong word or \"sound a like\" substitutions may have occurred due to the inherent limitations of voice recognition software.  Read the chart carefully and recognize, " using context, where substitutions have occurred.         Problems Addressed:  Dysfunctional uterine bleeding: acute illness or injury  Uterine fibroid: acute illness or injury    Amount and/or Complexity of Data Reviewed  External Data Reviewed: labs, radiology and notes.  Labs: ordered. Decision-making details documented in ED Course.  Radiology: ordered. Decision-making details documented in ED Course.    Risk  Prescription drug management.        ED Course as of 07/15/25 0517   Mon Jul 14, 2025 2123 Hemoglobin(!): 11.3   2316 Message sent to OB       Medications   ketorolac (TORADOL) injection 15 mg (15 mg Intravenous Given 7/14/25 2115)   sodium chloride 0.9 % bolus 500 mL (0 mL Intravenous Stopped 7/15/25 0057)   tranexamic acid (CYKLOKAPRON) 1000-0.7 MG/100ML-% injection 1,000 mg (0 mg Intravenous Stopped 7/15/25 0057)       ED Risk Strat Scores                    No data recorded        SBIRT 20yo+      Flowsheet Row Most Recent Value   Initial Alcohol Screen: US AUDIT-C     1. How often do you have a drink containing alcohol? 0 Filed at: 07/14/2025 2046   2. How many drinks containing alcohol do you have on a typical day you are drinking?  0 Filed at: 07/14/2025 2046   3b. FEMALE Any Age, or MALE 65+: How often do you have 4 or more drinks on one occassion? 0 Filed at: 07/14/2025 2046   Audit-C Score 0 Filed at: 07/14/2025 2046   ERNIE: How many times in the past year have you...    Used an illegal drug or used a prescription medication for non-medical reasons? Never Filed at: 07/14/2025 2046                            History of Present Illness       Chief Complaint   Patient presents with    Vaginal Bleeding     Pt reports vaginal bleeding since may, had an obgyn appt and was scheduled for  ultrasound this sat but today started with heavier bleeding and clots. Pt reports dizziness and shaking. +abd cramping       Past Medical History[1]   Past Surgical History[2]   Family History[3]   Social History[4]    E-Cigarette/Vaping    E-Cigarette Use Never User       E-Cigarette/Vaping Substances    Nicotine No     THC No     CBD No     Flavoring No     Other No     Unknown No       I have reviewed and agree with the history as documented.     Patient is a 30 y.o.  with LNMP 2025 (approximate) who presents to the ED for evaluation of vaginal bleeding.      She reports she got her period when she was supposed to on 2025. She had spotting for 3 days and then a normal period for 5 days. Her bleeding stopped for a week. Then she started spotting for about 4 days and then she developed menstrual bleeding. She reports her bleeding has waxed and waned since then. She went to the ED on 2025 and was prescribed Provera for 5 days. She reports he bleeding decreased to spotting and then worsened.     Today, she reports bleeding became much heavier, consisting of large clots. She has to change a pad several times within an hour. She does have associated lower abdominal pain described as cramping. She also reports shakiness, and lightheadedness. She saw her OB/GYN (Dr. Early) on  who ordered a TVUS, additional labs, and TXA. The patient has not yet taken the TXA as she wanted to get the results first to determine what is causing the bleeding before taking another medicine for it.    She otherwise denies syncope, fever, chills, nausea, vomiting, or other symptoms.           Review of Systems   Constitutional:  Negative for chills and fever.   Respiratory:  Negative for cough and shortness of breath.    Cardiovascular:  Negative for chest pain and leg swelling.   Gastrointestinal:  Positive for abdominal pain. Negative for constipation, diarrhea, nausea and vomiting.   Genitourinary:  Positive for vaginal bleeding. Negative for dysuria and flank pain.   Musculoskeletal:  Negative for arthralgias and myalgias.   Skin:  Negative for rash and wound.   Neurological:  Positive for dizziness. Negative for numbness.            Objective       ED Triage Vitals   Temperature Pulse Blood Pressure Respirations SpO2 Patient Position - Orthostatic VS   07/14/25 2046 07/14/25 2044 07/14/25 2046 07/14/25 2044 07/14/25 2044 07/14/25 2044   98.7 °F (37.1 °C) (!) 112 113/77 18 94 % Sitting      Temp Source Heart Rate Source BP Location FiO2 (%) Pain Score    07/14/25 2046 07/14/25 2044 07/14/25 2044 -- 07/14/25 2044    Oral Monitor Right arm  10 - Worst Possible Pain      Vitals      Date and Time Temp Pulse SpO2 Resp BP Pain Score FACES Pain Rating User   07/14/25 2304 -- 103 96 % 17 121/73 -- -- ZC   07/14/25 2115 -- -- -- -- -- 9 -- DW   07/14/25 2046 98.7 °F (37.1 °C) -- -- -- 113/77 -- -- SL   07/14/25 2044 -- 112 94 % 18 -- 10 - Worst Possible Pain -- SL            Physical Exam  Vitals and nursing note reviewed. Exam conducted with a chaperone present (Nettie CARRERO).   Constitutional:       General: She is not in acute distress.     Appearance: She is well-developed.   HENT:      Head: Normocephalic and atraumatic.     Eyes:      Conjunctiva/sclera: Conjunctivae normal.       Cardiovascular:      Rate and Rhythm: Regular rhythm. Tachycardia present.      Heart sounds: No murmur heard.  Pulmonary:      Effort: Pulmonary effort is normal. No respiratory distress.      Breath sounds: Normal breath sounds.   Abdominal:      Palpations: Abdomen is soft.      Tenderness: There is no abdominal tenderness. There is no guarding or rebound.   Genitourinary:     Labia:         Right: No lesion.         Left: No lesion.       Comments: External exam reveals passing of large blood clots with bright red blood coming from vagina     Musculoskeletal:         General: No swelling.      Cervical back: Neck supple.     Skin:     General: Skin is warm and dry.      Capillary Refill: Capillary refill takes less than 2 seconds.     Neurological:      Mental Status: She is alert.     Psychiatric:         Mood and Affect: Mood normal.         Results  Reviewed       Procedure Component Value Units Date/Time    TSH, 3rd generation with Free T4 reflex [[id number]]  (Normal) Collected: 07/14/25 2116    Lab Status: Final result Specimen: Blood from Arm, Left Updated: 07/14/25 2200     TSH 3RD GENERATION 3.785 uIU/mL     hCG, qualitative pregnancy [[id number]]  (Normal) Collected: 07/14/25 2116    Lab Status: Final result Specimen: Blood from Arm, Left Updated: 07/14/25 2200     Preg, Serum Negative    Prolactin [[id number]] Collected: 07/14/25 2116    Lab Status: In process Specimen: Blood from Arm, Left Updated: 07/14/25 2142    Protime-INR [[id number]]  (Normal) Collected: 07/14/25 2116    Lab Status: Final result Specimen: Blood from Arm, Left Updated: 07/14/25 2140     Protime 13.9 seconds      INR 1.05    Narrative:      INR Therapeutic Range    Indication                                             INR Range      Atrial Fibrillation                                               2.0-3.0  Hypercoagulable State                                    2.0.2.3  Left Ventricular Asist Device                            2.0-3.0  Mechanical Heart Valve                                  -    Aortic(with afib, MI, embolism, HF, LA enlargement,    and/or coagulopathy)                                     2.0-3.0 (2.5-3.5)     Mitral                                                             2.5-3.5  Prosthetic/Bioprosthetic Heart Valve               2.0-3.0  Venous thromboembolism (VTE: VT, PE        2.0-3.0    APTT [[id number]]  (Abnormal) Collected: 07/14/25 2116    Lab Status: Final result Specimen: Blood from Arm, Left Updated: 07/14/25 2140     PTT 35 seconds     Comprehensive metabolic panel [[id number]]  (Abnormal) Collected: 07/14/25 2116    Lab Status: Final result Specimen: Blood from Arm, Left Updated: 07/14/25 2139     Sodium 138 mmol/L      Potassium 3.2 mmol/L      Chloride 107 mmol/L      CO2 24 mmol/L      ANION GAP 7 mmol/L      BUN 12 mg/dL      Creatinine 0.69 mg/dL       Glucose 166 mg/dL      Calcium 8.6 mg/dL      AST 22 U/L      ALT 35 U/L      Alkaline Phosphatase 77 U/L      Total Protein 7.3 g/dL      Albumin 4.0 g/dL      Total Bilirubin 0.36 mg/dL      eGFR 115 ml/min/1.73sq m     Narrative:      National Kid[employer] Foundation guidelines for Chronic Kidney Disease (CKD):     Stage 1 with normal or high GFR (GFR > 90 mL/min/1.73 square meters)    Stage 2 Mild CKD (GFR = 60-89 mL/min/1.73 square meters)    Stage 3A Moderate CKD (GFR = 45-59 mL/min/1.73 square meters)    Stage 3B Moderate CKD (GFR = 30-44 mL/min/1.73 square meters)    Stage 4 Severe CKD (GFR = 15-29 mL/min/1.73 square meters)    Stage 5 End Stage CKD (GFR <15 mL/min/1.73 square meters)  Note: GFR calculation is accurate only with a steady state creatinine    CBC and differential [[id number]]  (Abnormal) Collected: 07/14/25 2116    Lab Status: Final result Specimen: Blood from Arm, Left Updated: 07/14/25 2122     WBC 10.08 Thousand/uL      RBC 3.96 Million/uL      Hemoglobin 11.3 g/dL      Hematocrit 32.9 %      MCV 83 fL      MCH 28.5 pg      MCHC 34.3 g/dL      RDW 13.1 %      MPV 10.3 fL      Platelets 305 Thousands/uL      nRBC 0 /100 WBCs      Segmented % 56 %      Immature Grans % 1 %      Lymphocytes % 34 %      Monocytes % 6 %      Eosinophils Relative 2 %      Basophils Relative 1 %      Absolute Neutrophils 5.69 Thousands/µL      Absolute Immature Grans 0.05 Thousand/uL      Absolute Lymphocytes 3.47 Thousands/µL      Absolute Monocytes 0.59 Thousand/µL      Eosinophils Absolute 0.23 Thousand/µL      Basophils Absolute 0.05 Thousands/µL             US pelvis complete w transvaginal   Final Interpretation by Daisy Melgoza MD (07/14 2311)      No acute pathology.      Fibroid uterus.                           Workstation performed: [id number]             Procedures    ED Medication and Procedure Management   Prior to Admission Medications   Prescriptions Last Dose Informant Patient Reported?  Taking?   Tranexamic Acid 650 MG TABS   No No   Sig: Take 2 tablets (1,300 mg total) by mouth 3 (three) times a day for 5 days   albuterol (Proventil HFA) 90 mcg/act inhaler   No No   Sig: Inhale 2 puffs every 6 (six) hours as needed for wheezing   butalbital-acetaminophen-caffeine (FIORICET,ESGIC) -40 mg per tablet   No No   Sig: Take 1 tablet by mouth every 4 (four) hours as needed for headaches or migraine   dicyclomine (BENTYL) 10 mg capsule   No No   Sig: Take 1 capsule (10 mg total) by mouth 4 (four) times a day as needed (Abdominal pain)   ergocalciferol (VITAMIN D2) 50,000 units   No No   Sig: Take 1 capsule (50,000 Units total) by mouth once a week for 12 doses   escitalopram (LEXAPRO) 10 mg tablet   No No   Sig: TAKE 1 TABLET BY MOUTH EVERY DAY   ferrous sulfate 324 (65 Fe) mg   No No   Sig: Take 1 tablet (324 mg total) by mouth daily before breakfast   hydrOXYzine HCL (ATARAX) 25 mg tablet   No No   Sig: Take 1 tablet (25 mg total) by mouth every 6 (six) hours as needed for anxiety   levothyroxine 25 mcg tablet   No No   Sig: Take 1 tablet (25 mcg total) by mouth daily in the early morning   methocarbamol (ROBAXIN) 500 mg tablet   No No   Sig: Take 1 tablet (500 mg total) by mouth 3 (three) times a day as needed for muscle spasms   ondansetron (ZOFRAN-ODT) 4 mg disintegrating tablet   No No   Sig: Take 1 tablet (4 mg total) by mouth every 6 (six) hours as needed for nausea or vomiting   topiramate (TOPAMAX) 50 MG tablet   No No   Sig: Take 1 tablet (50 mg total) by mouth 2 (two) times a day   triamcinolone (KENALOG) 0.1 % cream   No No   Sig: Apply topically 2 (two) times a day      Facility-Administered Medications: None     Discharge Medication List as of 7/15/2025 12:41 AM        CONTINUE these medications which have NOT CHANGED    Details   albuterol (Proventil HFA) 90 mcg/act inhaler Inhale 2 puffs every 6 (six) hours as needed for wheezing, Starting Tue 12/31/2024, Normal       butalbital-acetaminophen-caffeine (FIORICET,ESGIC) -40 mg per tablet Take 1 tablet by mouth every 4 (four) hours as needed for headaches or migraine, Starting Tue 5/6/2025, Normal      dicyclomine (BENTYL) 10 mg capsule Take 1 capsule (10 mg total) by mouth 4 (four) times a day as needed (Abdominal pain), Starting Tue 5/6/2025, Normal      ergocalciferol (VITAMIN D2) 50,000 units Take 1 capsule (50,000 Units total) by mouth once a week for 12 doses, Starting Tue 5/6/2025, Until Wed 7/23/2025, Normal      escitalopram (LEXAPRO) 10 mg tablet TAKE 1 TABLET BY MOUTH EVERY DAY, Starting Fri 7/4/2025, Normal      ferrous sulfate 324 (65 Fe) mg Take 1 tablet (324 mg total) by mouth daily before breakfast, Starting Tue 5/6/2025, Normal      hydrOXYzine HCL (ATARAX) 25 mg tablet Take 1 tablet (25 mg total) by mouth every 6 (six) hours as needed for anxiety, Starting Wed 6/11/2025, Normal      levothyroxine 25 mcg tablet Take 1 tablet (25 mcg total) by mouth daily in the early morning, Starting Tue 5/6/2025, Normal      methocarbamol (ROBAXIN) 500 mg tablet Take 1 tablet (500 mg total) by mouth 3 (three) times a day as needed for muscle spasms, Starting Fri 5/9/2025, Normal      ondansetron (ZOFRAN-ODT) 4 mg disintegrating tablet Take 1 tablet (4 mg total) by mouth every 6 (six) hours as needed for nausea or vomiting, Starting Fri 2/7/2025, Normal      topiramate (TOPAMAX) 50 MG tablet Take 1 tablet (50 mg total) by mouth 2 (two) times a day, Starting Tue 5/6/2025, Normal      triamcinolone (KENALOG) 0.1 % cream Apply topically 2 (two) times a day, Starting Wed 2/12/2025, Normal           STOP taking these medications       Tranexamic Acid 650 MG TABS Comments:   Reason for Stopping:             Outpatient Discharge Orders   CBC and differential   Standing Status: Future Standing Exp. Date: 09/15/26     ED SEPSIS DOCUMENTATION   Time reflects when diagnosis was documented in both MDM as applicable and the Disposition  within this note       Time User Action Codes Description Comment    7/15/2025 12:12 AM Melody Solares [N93.8] Dysfunctional uterine bleeding     7/15/2025 12:12 AM Melody Solares [D25.9] Uterine fibroid                    [1]   Past Medical History:  Diagnosis Date    Abnormal Pap smear of cervix 2015 pap negative;    Anxiety     Asthma     Bronchitis 2024    Chlamydia 2014    Costochondritis 2023    Depression     Diabetes mellitus (HCC)     Gestational Diabetes    Fatty liver     GERD (gastroesophageal reflux disease)     Gestational diabetes     Headache(784.0)     Leukocytosis 2024    Migraine     will be seeing an neurologist    Otitis media     Pre-diabetes     Sunburn 2024    Viral gastroenteritis 2025    Visual impairment     Yeast infection 2023   [2]   Past Surgical History:  Procedure Laterality Date     SECTION      induced due to GDM, ? ruptured placenta w/ emergency C/S    LYMPHADENECTOMY     [3]   Family History  Problem Relation Name Age of Onset    Cancer Mother Theodora J Colon 54        Multiple Myeloma    Diabetes Maternal Grandmother An [place]     Breast cancer Other materal great aunt     Colon cancer Neg Hx      Ovarian cancer Neg Hx      Uterine cancer Neg Hx     [4]   Social History  Tobacco Use    Smoking status: Never    Smokeless tobacco: Never   Vaping Use    Vaping status: Never Used   Substance Use Topics    Alcohol use: Not Currently    Drug use: Never        Melody Solares PA-C  07/15/25 0519

## 2025-07-16 ENCOUNTER — VBI (OUTPATIENT)
Dept: FAMILY MEDICINE CLINIC | Facility: CLINIC | Age: 30
End: 2025-07-16

## 2025-07-16 NOTE — TELEPHONE ENCOUNTER
07/16/25 3:16 PM    Patient contacted post ED visit, [facility] department spoke with patient/caregiver and outreach was successful.    Thank you.  Katia Irving  [facility]

## 2025-07-23 ENCOUNTER — APPOINTMENT (OUTPATIENT)
Dept: LAB | Facility: CLINIC | Age: 30
End: 2025-07-23
Payer: COMMERCIAL

## 2025-07-23 ENCOUNTER — TELEPHONE (OUTPATIENT)
Dept: BARIATRICS | Facility: CLINIC | Age: 30
End: 2025-07-23

## 2025-07-23 ENCOUNTER — ANNUAL EXAM (OUTPATIENT)
Dept: OBGYN CLINIC | Facility: CLINIC | Age: 30
End: 2025-07-23
Payer: COMMERCIAL

## 2025-07-23 VITALS
WEIGHT: 163 LBS | DIASTOLIC BLOOD PRESSURE: 70 MMHG | BODY MASS INDEX: 32.86 KG/M2 | SYSTOLIC BLOOD PRESSURE: 120 MMHG | HEIGHT: 59 IN

## 2025-07-23 DIAGNOSIS — E03.8 SUBCLINICAL HYPOTHYROIDISM: ICD-10-CM

## 2025-07-23 DIAGNOSIS — R53.83 OTHER FATIGUE: ICD-10-CM

## 2025-07-23 DIAGNOSIS — N89.8 VAGINAL DISCHARGE: ICD-10-CM

## 2025-07-23 DIAGNOSIS — Z01.419 WOMEN'S ANNUAL ROUTINE GYNECOLOGICAL EXAMINATION: Primary | ICD-10-CM

## 2025-07-23 DIAGNOSIS — N93.9 ABNORMAL UTERINE BLEEDING (AUB): ICD-10-CM

## 2025-07-23 DIAGNOSIS — R10.11 RIGHT UPPER QUADRANT ABDOMINAL PAIN: ICD-10-CM

## 2025-07-23 DIAGNOSIS — E55.9 VITAMIN D DEFICIENCY: ICD-10-CM

## 2025-07-23 DIAGNOSIS — E61.1 IRON DEFICIENCY: ICD-10-CM

## 2025-07-23 DIAGNOSIS — N93.8 DYSFUNCTIONAL UTERINE BLEEDING: ICD-10-CM

## 2025-07-23 LAB
BASOPHILS # BLD AUTO: 0.06 THOUSANDS/ÂΜL (ref 0–0.1)
BASOPHILS NFR BLD AUTO: 1 % (ref 0–1)
EOSINOPHIL # BLD AUTO: 0.16 THOUSAND/ÂΜL (ref 0–0.61)
EOSINOPHIL NFR BLD AUTO: 2 % (ref 0–6)
ERYTHROCYTE [DISTWIDTH] IN BLOOD BY AUTOMATED COUNT: 13.1 % (ref 11.6–15.1)
HCT VFR BLD AUTO: 34 % (ref 34.8–46.1)
HGB BLD-MCNC: 11.1 G/DL (ref 11.5–15.4)
IMM GRANULOCYTES # BLD AUTO: 0.05 THOUSAND/UL (ref 0–0.2)
IMM GRANULOCYTES NFR BLD AUTO: 1 % (ref 0–2)
LYMPHOCYTES # BLD AUTO: 2.65 THOUSANDS/ÂΜL (ref 0.6–4.47)
LYMPHOCYTES NFR BLD AUTO: 31 % (ref 14–44)
MCH RBC QN AUTO: 27.8 PG (ref 26.8–34.3)
MCHC RBC AUTO-ENTMCNC: 32.6 G/DL (ref 31.4–37.4)
MCV RBC AUTO: 85 FL (ref 82–98)
MONOCYTES # BLD AUTO: 0.53 THOUSAND/ÂΜL (ref 0.17–1.22)
MONOCYTES NFR BLD AUTO: 6 % (ref 4–12)
NEUTROPHILS # BLD AUTO: 5.21 THOUSANDS/ÂΜL (ref 1.85–7.62)
NEUTS SEG NFR BLD AUTO: 59 % (ref 43–75)
NRBC BLD AUTO-RTO: 0 /100 WBCS
PLATELET # BLD AUTO: 353 THOUSANDS/UL (ref 149–390)
PMV BLD AUTO: 10.6 FL (ref 8.9–12.7)
PROLACTIN SERPL-MCNC: 10.06 NG/ML (ref 3.34–26.72)
RBC # BLD AUTO: 4 MILLION/UL (ref 3.81–5.12)
T4 FREE SERPL-MCNC: 1.05 NG/DL (ref 0.61–1.12)
TSH SERPL DL<=0.05 MIU/L-ACNC: 1.61 UIU/ML (ref 0.45–4.5)
WBC # BLD AUTO: 8.66 THOUSAND/UL (ref 4.31–10.16)

## 2025-07-23 PROCEDURE — 36415 COLL VENOUS BLD VENIPUNCTURE: CPT

## 2025-07-23 PROCEDURE — S0612 ANNUAL GYNECOLOGICAL EXAMINA: HCPCS | Performed by: OBSTETRICS & GYNECOLOGY

## 2025-07-23 PROCEDURE — G0143 SCR C/V CYTO,THINLAYER,RESCR: HCPCS | Performed by: OBSTETRICS & GYNECOLOGY

## 2025-07-23 PROCEDURE — 84439 ASSAY OF FREE THYROXINE: CPT

## 2025-07-23 PROCEDURE — 84146 ASSAY OF PROLACTIN: CPT

## 2025-07-23 PROCEDURE — 84443 ASSAY THYROID STIM HORMONE: CPT

## 2025-07-23 PROCEDURE — 85025 COMPLETE CBC W/AUTO DIFF WBC: CPT

## 2025-07-23 PROCEDURE — G0476 HPV COMBO ASSAY CA SCREEN: HCPCS | Performed by: OBSTETRICS & GYNECOLOGY

## 2025-07-23 NOTE — PROGRESS NOTES
"Subjective      María Gusman is a 30 y.o. female who presents for annual well woman exam.     GYN:  Reports malodorous vaginal discharge  Menses have been prolonged and irregular recently; was recently prescribed Provera and then subsequently TXA to help with the bleeding; small fibroid noted on pelvic ultrasound; labs pending  Contraception: none, TTC.  Patient is sexually active with her male partner.  Gardasil: completed    OB:   female    :  Denies dysuria, urinary frequency or urgency.  Denies hematuria, flank pain, incontinence.  Denies constipation, diarrhea    Breast:  Denies breast mass, skin changes, dimpling, reddening, nipple retraction.  Denies breast discharge.  Patient's maternal great aunt had breast cancer     General:  ETOH use: no  Tobacco use: no  Recreational drug use: no    Screening:  Cervical cancer: last pap smear in 2018. Results were NILM/HPV neg.    Review of Systems  Pertinent items are noted in HPI.      Objective      /70 (BP Location: Right arm, Patient Position: Sitting, Cuff Size: Standard)   Ht 4' 11\" (1.499 m)   Wt 73.9 kg (163 lb)   LMP 2025 (Approximate)   BMI 32.92 kg/m²     Physical Exam  Constitutional:       Appearance: Normal appearance.   HENT:      Head: Normocephalic and atraumatic.     Cardiovascular:      Rate and Rhythm: Normal rate.   Pulmonary:      Effort: Pulmonary effort is normal. No respiratory distress.   Chest:   Breasts:     Right: Normal. No mass or tenderness.      Left: Normal. No mass or tenderness.   Abdominal:      Palpations: Abdomen is soft.      Tenderness: There is no abdominal tenderness.   Genitourinary:     General: Normal vulva.      Pubic Area: No rash.       Labia:         Right: No lesion.         Left: No lesion.       Urethra: No urethral lesion.      Vagina: No vaginal discharge or lesions.      Cervix: Normal. No lesion.      Uterus: Normal.       Adnexa: Right adnexa normal and left adnexa normal.        " Right: No mass or tenderness.          Left: No mass or tenderness.        Rectum: No external hemorrhoid.     Skin:     General: Skin is warm and dry.     Neurological:      Mental Status: She is alert.                 Assessment     29 yo presents today for her annual exam     Plan   - Affirm collected for vaginal discharge   - Pap smear obtained today

## 2025-07-24 LAB
HPV HR 12 DNA CVX QL NAA+PROBE: NEGATIVE
HPV16 DNA CVX QL NAA+PROBE: NEGATIVE
HPV18 DNA CVX QL NAA+PROBE: NEGATIVE

## 2025-07-28 LAB
LAB AP GYN PRIMARY INTERPRETATION: NORMAL
Lab: NORMAL
PATH INTERP SPEC-IMP: NORMAL

## 2025-07-31 DIAGNOSIS — A08.4 VIRAL GASTROENTERITIS: ICD-10-CM

## 2025-07-31 DIAGNOSIS — G43.809 OTHER MIGRAINE WITHOUT STATUS MIGRAINOSUS, NOT INTRACTABLE: ICD-10-CM

## 2025-07-31 RX ORDER — BUTALBITAL, ACETAMINOPHEN AND CAFFEINE 50; 325; 40 MG/1; MG/1; MG/1
1 TABLET ORAL EVERY 4 HOURS PRN
Qty: 30 TABLET | Refills: 0 | Status: SHIPPED | OUTPATIENT
Start: 2025-07-31

## 2025-07-31 RX ORDER — ONDANSETRON 4 MG/1
4 TABLET, ORALLY DISINTEGRATING ORAL EVERY 6 HOURS PRN
Qty: 30 TABLET | Refills: 0 | Status: SHIPPED | OUTPATIENT
Start: 2025-07-31

## 2025-08-01 DIAGNOSIS — E55.9 VITAMIN D DEFICIENCY: ICD-10-CM

## 2025-08-01 RX ORDER — ERGOCALCIFEROL 1.25 MG/1
50000 CAPSULE, LIQUID FILLED ORAL WEEKLY
Qty: 12 CAPSULE | Refills: 0 | OUTPATIENT
Start: 2025-08-01 | End: 2025-10-18